# Patient Record
Sex: FEMALE | Race: WHITE | Employment: UNEMPLOYED | URBAN - METROPOLITAN AREA
[De-identification: names, ages, dates, MRNs, and addresses within clinical notes are randomized per-mention and may not be internally consistent; named-entity substitution may affect disease eponyms.]

---

## 2017-04-07 ENCOUNTER — TELEPHONE (OUTPATIENT)
Dept: INTERNAL MEDICINE | Age: 57
End: 2017-04-07

## 2017-07-20 RX ORDER — OMEPRAZOLE 40 MG/1
CAPSULE, DELAYED RELEASE ORAL
Qty: 90 CAPSULE | Refills: 3 | Status: SHIPPED | OUTPATIENT
Start: 2017-07-20 | End: 2018-12-10 | Stop reason: SDUPTHER

## 2017-07-27 RX ORDER — FENOFIBRATE 145 MG/1
145 TABLET, COATED ORAL DAILY
Qty: 90 TABLET | Refills: 1 | Status: SHIPPED | OUTPATIENT
Start: 2017-07-27 | End: 2018-05-15 | Stop reason: SDUPTHER

## 2017-07-27 RX ORDER — ATORVASTATIN CALCIUM 40 MG/1
40 TABLET, FILM COATED ORAL DAILY
Qty: 90 TABLET | Refills: 1 | Status: SHIPPED | OUTPATIENT
Start: 2017-07-27 | End: 2018-05-15 | Stop reason: SDUPTHER

## 2018-05-15 ENCOUNTER — OFFICE VISIT (OUTPATIENT)
Dept: INTERNAL MEDICINE CLINIC | Age: 58
End: 2018-05-15

## 2018-05-15 VITALS
RESPIRATION RATE: 16 BRPM | TEMPERATURE: 98.2 F | DIASTOLIC BLOOD PRESSURE: 78 MMHG | OXYGEN SATURATION: 98 % | BODY MASS INDEX: 28.67 KG/M2 | WEIGHT: 178.4 LBS | HEART RATE: 98 BPM | HEIGHT: 66 IN | SYSTOLIC BLOOD PRESSURE: 132 MMHG

## 2018-05-15 DIAGNOSIS — E11.22 TYPE 2 DIABETES MELLITUS WITH STAGE 4 CHRONIC KIDNEY DISEASE, WITH LONG-TERM CURRENT USE OF INSULIN (HCC): ICD-10-CM

## 2018-05-15 DIAGNOSIS — R53.83 FATIGUE, UNSPECIFIED TYPE: ICD-10-CM

## 2018-05-15 DIAGNOSIS — E78.2 MIXED HYPERLIPIDEMIA: ICD-10-CM

## 2018-05-15 DIAGNOSIS — E13.21 DIABETIC NEPHROPATHY ASSOCIATED WITH OTHER SPECIFIED DIABETES MELLITUS (HCC): ICD-10-CM

## 2018-05-15 DIAGNOSIS — Z13.31 POSITIVE DEPRESSION SCREENING: ICD-10-CM

## 2018-05-15 DIAGNOSIS — K86.1 IDIOPATHIC CHRONIC PANCREATITIS (HCC): ICD-10-CM

## 2018-05-15 DIAGNOSIS — Z79.4 TYPE 2 DIABETES MELLITUS WITH STAGE 4 CHRONIC KIDNEY DISEASE, WITH LONG-TERM CURRENT USE OF INSULIN (HCC): ICD-10-CM

## 2018-05-15 DIAGNOSIS — Z23 NEED FOR PNEUMOCOCCAL VACCINATION: ICD-10-CM

## 2018-05-15 DIAGNOSIS — I10 ESSENTIAL HYPERTENSION: Primary | ICD-10-CM

## 2018-05-15 DIAGNOSIS — Z12.31 SCREENING MAMMOGRAM, ENCOUNTER FOR: ICD-10-CM

## 2018-05-15 DIAGNOSIS — N18.4 TYPE 2 DIABETES MELLITUS WITH STAGE 4 CHRONIC KIDNEY DISEASE, WITH LONG-TERM CURRENT USE OF INSULIN (HCC): ICD-10-CM

## 2018-05-15 DIAGNOSIS — K50.119 CROHN'S DISEASE OF LARGE INTESTINE WITH COMPLICATION (HCC): ICD-10-CM

## 2018-05-15 DIAGNOSIS — F32.89 OTHER DEPRESSION: ICD-10-CM

## 2018-05-15 PROCEDURE — 99214 OFFICE O/P EST MOD 30 MIN: CPT | Performed by: FAMILY MEDICINE

## 2018-05-15 PROCEDURE — 90471 IMMUNIZATION ADMIN: CPT | Performed by: FAMILY MEDICINE

## 2018-05-15 PROCEDURE — G0444 DEPRESSION SCREEN ANNUAL: HCPCS | Performed by: FAMILY MEDICINE

## 2018-05-15 PROCEDURE — G8431 POS CLIN DEPRES SCRN F/U DOC: HCPCS | Performed by: FAMILY MEDICINE

## 2018-05-15 PROCEDURE — 90670 PCV13 VACCINE IM: CPT | Performed by: FAMILY MEDICINE

## 2018-05-15 RX ORDER — INDAPAMIDE 1.25 MG/1
1 TABLET, FILM COATED ORAL DAILY
Refills: 0 | COMMUNITY
Start: 2018-05-02 | End: 2020-04-13

## 2018-05-15 RX ORDER — ATORVASTATIN CALCIUM 40 MG/1
40 TABLET, FILM COATED ORAL DAILY
Qty: 90 TABLET | Refills: 3 | Status: SHIPPED | OUTPATIENT
Start: 2018-05-15 | End: 2019-08-16 | Stop reason: SDUPTHER

## 2018-05-15 RX ORDER — DULOXETIN HYDROCHLORIDE 30 MG/1
30 CAPSULE, DELAYED RELEASE ORAL DAILY
Qty: 90 CAPSULE | Refills: 1 | Status: SHIPPED | OUTPATIENT
Start: 2018-05-15 | End: 2020-04-13

## 2018-05-15 RX ORDER — FENOFIBRATE 145 MG/1
145 TABLET, COATED ORAL DAILY
Qty: 90 TABLET | Refills: 3 | Status: SHIPPED | OUTPATIENT
Start: 2018-05-15 | End: 2019-08-16 | Stop reason: SDUPTHER

## 2018-05-15 RX ORDER — SODIUM BICARBONATE 650 MG/1
650 TABLET ORAL 2 TIMES DAILY
Qty: 180 TABLET | Refills: 3 | Status: SHIPPED | OUTPATIENT
Start: 2018-05-15 | End: 2020-03-11 | Stop reason: SDUPTHER

## 2018-05-15 ASSESSMENT — PATIENT HEALTH QUESTIONNAIRE - PHQ9
5. POOR APPETITE OR OVEREATING: 2
3. TROUBLE FALLING OR STAYING ASLEEP: 3
7. TROUBLE CONCENTRATING ON THINGS, SUCH AS READING THE NEWSPAPER OR WATCHING TELEVISION: 1
10. IF YOU CHECKED OFF ANY PROBLEMS, HOW DIFFICULT HAVE THESE PROBLEMS MADE IT FOR YOU TO DO YOUR WORK, TAKE CARE OF THINGS AT HOME, OR GET ALONG WITH OTHER PEOPLE: 2
2. FEELING DOWN, DEPRESSED OR HOPELESS: 3
8. MOVING OR SPEAKING SO SLOWLY THAT OTHER PEOPLE COULD HAVE NOTICED. OR THE OPPOSITE, BEING SO FIGETY OR RESTLESS THAT YOU HAVE BEEN MOVING AROUND A LOT MORE THAN USUAL: 0
9. THOUGHTS THAT YOU WOULD BE BETTER OFF DEAD, OR OF HURTING YOURSELF: 0
SUM OF ALL RESPONSES TO PHQ9 QUESTIONS 1 & 2: 3
6. FEELING BAD ABOUT YOURSELF - OR THAT YOU ARE A FAILURE OR HAVE LET YOURSELF OR YOUR FAMILY DOWN: 0
SUM OF ALL RESPONSES TO PHQ QUESTIONS 1-9: 12
4. FEELING TIRED OR HAVING LITTLE ENERGY: 3

## 2018-09-26 ENCOUNTER — TELEPHONE (OUTPATIENT)
Dept: INTERNAL MEDICINE CLINIC | Age: 58
End: 2018-09-26

## 2018-12-11 RX ORDER — OMEPRAZOLE 40 MG/1
CAPSULE, DELAYED RELEASE ORAL
Qty: 90 CAPSULE | Refills: 3 | Status: SHIPPED | OUTPATIENT
Start: 2018-12-11 | End: 2020-03-11 | Stop reason: SDUPTHER

## 2019-06-27 ENCOUNTER — OFFICE VISIT (OUTPATIENT)
Dept: FAMILY MEDICINE CLINIC | Age: 59
End: 2019-06-27
Payer: COMMERCIAL

## 2019-06-27 VITALS
SYSTOLIC BLOOD PRESSURE: 150 MMHG | OXYGEN SATURATION: 98 % | BODY MASS INDEX: 28.61 KG/M2 | TEMPERATURE: 98.6 F | WEIGHT: 178 LBS | HEIGHT: 66 IN | HEART RATE: 97 BPM | DIASTOLIC BLOOD PRESSURE: 88 MMHG

## 2019-06-27 DIAGNOSIS — N18.4 TYPE 2 DIABETES MELLITUS WITH STAGE 4 CHRONIC KIDNEY DISEASE, WITH LONG-TERM CURRENT USE OF INSULIN (HCC): ICD-10-CM

## 2019-06-27 DIAGNOSIS — Z79.4 TYPE 2 DIABETES MELLITUS WITH STAGE 4 CHRONIC KIDNEY DISEASE, WITH LONG-TERM CURRENT USE OF INSULIN (HCC): ICD-10-CM

## 2019-06-27 DIAGNOSIS — Z12.39 BREAST CANCER SCREENING: ICD-10-CM

## 2019-06-27 DIAGNOSIS — R30.0 DYSURIA: Primary | ICD-10-CM

## 2019-06-27 DIAGNOSIS — E11.22 TYPE 2 DIABETES MELLITUS WITH STAGE 4 CHRONIC KIDNEY DISEASE, WITH LONG-TERM CURRENT USE OF INSULIN (HCC): ICD-10-CM

## 2019-06-27 LAB
BILIRUBIN, POC: NORMAL
BLOOD URINE, POC: NORMAL
CLARITY, POC: NORMAL
COLOR, POC: YELLOW
CREATININE URINE: 121.7 MG/DL
GLUCOSE URINE, POC: NORMAL
KETONES, POC: NORMAL
LEUKOCYTE EST, POC: NORMAL
MICROALBUMIN UR-MCNC: 247.4 MG/DL
MICROALBUMIN/CREAT UR-RTO: 2032.9 MG/G (ref 0–30)
NITRITE, POC: NORMAL
PH, POC: 5.5
PROTEIN, POC: NORMAL
SPECIFIC GRAVITY, POC: 1.03
UROBILINOGEN, POC: 3.5

## 2019-06-27 PROCEDURE — 81002 URINALYSIS NONAUTO W/O SCOPE: CPT | Performed by: FAMILY MEDICINE

## 2019-06-27 PROCEDURE — 99213 OFFICE O/P EST LOW 20 MIN: CPT | Performed by: FAMILY MEDICINE

## 2019-06-27 RX ORDER — CIPROFLOXACIN 500 MG/1
500 TABLET, FILM COATED ORAL 2 TIMES DAILY
Qty: 20 TABLET | Refills: 0 | Status: SHIPPED | OUTPATIENT
Start: 2019-06-27 | End: 2019-07-07

## 2019-06-27 NOTE — PROGRESS NOTES
Patient: Lea Workman    YOB: 1960    Date: 6/27/19    Chief Complaint   Patient presents with    Urinary Frequency     pt has been having a urinary frequency for 1 week    Dysuria     she is having burning and pain during urination & feels worse today        Patient Active Problem List    Diagnosis Date Noted    Diabetic nephropathy ([facility] 08/06/2013    Insomnia 07/01/2013    CRF (chronic renal failure) 07/01/2013    Crohn's disease ([facility]     Type 2 diabetes mellitus with stage 4 chronic kidney disease, with long-term current use of insulin ([facility]     Depression     Mixed hyperlipidemia     Essential hypertension     Chronic pancreatitis (HCC)      Overview Note:     replace inactive diagnosis      Allergic rhinitis      Overview Note:     replace inactive diagnosis         Allergies   Allergen Reactions    Iv Dye [Iodides]     Ketorolac Tromethamine        Vitals:    06/27/19 1406   BP: (!) 150/88   Site: Left Upper Arm   Position: Sitting   Cuff Size: Medium Adult   Pulse: 97   Temp: 98.6 °F (37 °C)   TempSrc: Oral   SpO2: 98%   Weight: 178 lb (80.7 kg)   Height: 5' 6\" (1.676 m)      Body mass index is 28.73 kg/m². Treatment Adherence:   Medication compliance:  compliant most of the time  Diet compliance:  compliant most of the time  Weight trend: stable  Current exercise: walks 1 time(s) per day and no regular exercise  What might prevent you from meeting your goal?: none    Diabetes Mellitus Type 2: Current symptoms/problems include none. Home blood sugar records:  patient tests 3 time(s) per day  Any episodes of hypoglycemia? no  Eye exam current (within one year): yes  Tobacco history: She  reports that she quit smoking about 8 years ago. Her smoking use included cigarettes. She started smoking about 44 years ago. She has a 135.00 pack-year smoking history. She has never used smokeless tobacco.   Daily Aspirin?  No:   Known diabetic complications: none        HPI  For last week she had increased burning frequency of urination. No fever chills back pain nausea or cramping. She gets these quite a bit and she is been working with her urologist at [facility] to decrease the frequency. She said her last hemoglobin A1c was 7. Review of Systems    Constitutional: Negative for fatigue, fever and sweats. HEENT: Negative for eye discharge and vision loss. Negative for ear drainage, hearing loss and nasal drainage. Respiratory: Negative for cough, dyspnea and wheezing. Cardiovascular:  Negative for chest pain, claudication and irregular heartbeat/palpitations. Gastrointestinal: Negative for abdominal pain, nausea, constipation and diarrhea. Genitourinary: positive for dysuria, pt is postmenopausal  Metabolic/Endocrine: Negative for cold intolerance, heat intolerance, polydipsia and polyphagia. No unintended weight loss or weight gain. Neuro/Psychiatric: Negative for gait disturbance. Negative for psychiatric symptoms. Dermatologic: Negative for pruritus and rash. Musculoskeletal: Negative for bone/joint symptoms. No numbness or tingling. No loss of function. Hematology: Negative for bleeding and easy bruising. Immunology:  Negative for environmental allergies and food allergies. Physical Exam    Patient's medication, allergies, past medical, surgical, social and family histories were reviewed and updated as appropriate. PHYSICAL EXAM   General appearance: Alert oriented pleasant cooperative in no acute distress. Lungs: Clear to auscultation  Heart: Regular rate and rhythm  Extremities: No rashes or edema. Assessment:   Diagnosis Orders   1. Dysuria  POCT Urinalysis no Micro  3+ blood leukocytes and sugar.   We will put her on ciprofloxacin and send the urine for C&S.   2. Breast cancer screening  KRISTY DIGITAL SCREEN W CAD BILATERAL               Plan:  Current Outpatient Medications   Medication Sig Dispense Refill    ciprofloxacin (CIPRO) 500 MG tablet Take 1 tablet by mouth 2 times daily for 10 days 20 tablet 0    omeprazole (PRILOSEC) 40 MG delayed release capsule TAKE 1 CAPSULE DAILY 90 capsule 3    Insulin Glargine (BASAGLAR KWIKPEN SC) Inject 35 Units into the skin 2 times daily      indapamide (LOZOL) 1.25 MG tablet Take 1 tablet by mouth daily  0    atorvastatin (LIPITOR) 40 MG tablet Take 1 tablet by mouth daily 90 tablet 3    fenofibrate (TRICOR) 145 MG tablet Take 1 tablet by mouth daily 90 tablet 3    sodium bicarbonate 650 MG tablet Take 1 tablet by mouth 2 times daily Take 2 tabs by mouth every 12 hours 180 tablet 3    DULoxetine (CYMBALTA) 30 MG extended release capsule Take 1 capsule by mouth daily 90 capsule 1    Cholecalciferol (VITAMIN D3) 38223 UNITS CAPS Take 50,000 Units by mouth once a week 12 capsule 0    fluconazole (DIFLUCAN) 200 MG tablet Take 1 tablet by mouth 2 times daily 180 tablet 3    Insulin Pen Needle 32G X 6 MM MISC Use as directed with insulin, use 5 times daily 500 each 3    Insulin Pen Needle 31G X 6 MM MISC Use as directed with insulin 500 each 3    Adalimumab (HUMIRA SC) Inject into the skin every 2 weeks      Multiple Vitamin (DAILY VITAMIN PO) Take  by mouth.  magnesium oxide (MAG-OX) 400 MG tablet Take 400 mg by mouth daily.  Diphenoxylate-Atropine (LOMOTIL PO) Take  by mouth. 2 before each meal total 6 per day       PREDNISONE Take 5 mg by mouth daily.  insulin lispro (HUMALOG KWIKPEN) 100 UNIT/ML injection Inject 15 Units into the skin 3 times daily (before meals) for 90 days. 10units before each meal and sliding scale if over 150 (Patient taking differently: Inject 20 Units into the skin 3 times daily (before meals) 10units before each meal and sliding scale if over 150) 20 Pen 3     No current facility-administered medications for this visit.       Orders Placed This Encounter   Procedures    KRISTY DIGITAL SCREEN W CAD BILATERAL     Standing Status:   Future     Standing Expiration Date: 6/26/2020    POCT Urinalysis no Micro       Orders Placed This Encounter   Medications    ciprofloxacin (CIPRO) 500 MG tablet     Sig: Take 1 tablet by mouth 2 times daily for 10 days     Dispense:  20 tablet     Refill:  0              Return if symptoms worsen or fail to improve.     Dr. Ashleigh Taylor      6/27/19  2:45 PM

## 2019-06-30 LAB
ORGANISM: ABNORMAL
URINE CULTURE, ROUTINE: ABNORMAL
URINE CULTURE, ROUTINE: ABNORMAL

## 2019-08-16 DIAGNOSIS — E78.2 MIXED HYPERLIPIDEMIA: ICD-10-CM

## 2019-08-16 RX ORDER — ATORVASTATIN CALCIUM 40 MG/1
40 TABLET, FILM COATED ORAL DAILY
Qty: 90 TABLET | Refills: 3 | Status: SHIPPED | OUTPATIENT
Start: 2019-08-16 | End: 2020-10-20 | Stop reason: SDUPTHER

## 2019-08-16 RX ORDER — FENOFIBRATE 145 MG/1
145 TABLET, COATED ORAL DAILY
Qty: 90 TABLET | Refills: 3 | Status: SHIPPED | OUTPATIENT
Start: 2019-08-16 | End: 2020-10-20 | Stop reason: SDUPTHER

## 2019-08-16 NOTE — TELEPHONE ENCOUNTER
requesting medication refill. Rx requested:  Requested Prescriptions     Pending Prescriptions Disp Refills    atorvastatin (LIPITOR) 40 MG tablet 90 tablet 3     Sig: Take 1 tablet by mouth daily    fenofibrate (TRICOR) 145 MG tablet 90 tablet 3     Sig: Take 1 tablet by mouth daily       Last Office Visit:   6/27/2019      Next Visit Date:  No future appointments.

## 2019-12-11 ENCOUNTER — TELEPHONE (OUTPATIENT)
Dept: ADMINISTRATIVE | Age: 59
End: 2019-12-11

## 2020-03-11 RX ORDER — OMEPRAZOLE 40 MG/1
CAPSULE, DELAYED RELEASE ORAL
Qty: 90 CAPSULE | Refills: 3 | Status: SHIPPED | OUTPATIENT
Start: 2020-03-11 | End: 2020-10-20 | Stop reason: SDUPTHER

## 2020-03-11 RX ORDER — SODIUM BICARBONATE 650 MG/1
650 TABLET ORAL 2 TIMES DAILY
Qty: 180 TABLET | Refills: 3 | Status: SHIPPED | OUTPATIENT
Start: 2020-03-11

## 2020-03-13 ENCOUNTER — TELEPHONE (OUTPATIENT)
Dept: FAMILY MEDICINE CLINIC | Age: 60
End: 2020-03-13

## 2020-04-13 ENCOUNTER — OFFICE VISIT (OUTPATIENT)
Dept: FAMILY MEDICINE CLINIC | Age: 60
End: 2020-04-13
Payer: COMMERCIAL

## 2020-04-13 VITALS
HEART RATE: 120 BPM | HEIGHT: 65 IN | WEIGHT: 173 LBS | BODY MASS INDEX: 28.82 KG/M2 | SYSTOLIC BLOOD PRESSURE: 134 MMHG | OXYGEN SATURATION: 97 % | DIASTOLIC BLOOD PRESSURE: 72 MMHG | TEMPERATURE: 99 F | RESPIRATION RATE: 16 BRPM

## 2020-04-13 DIAGNOSIS — R30.0 DYSURIA: ICD-10-CM

## 2020-04-13 LAB
BILIRUBIN, POC: NORMAL
BLOOD URINE, POC: NORMAL
CLARITY, POC: CLEAR
COLOR, POC: YELLOW
GLUCOSE URINE, POC: NORMAL
KETONES, POC: NORMAL
LEUKOCYTE EST, POC: NORMAL
NITRITE, POC: NORMAL
PH, POC: 5.5
PROTEIN, POC: NORMAL
SPECIFIC GRAVITY, POC: 1.02
UROBILINOGEN, POC: NORMAL

## 2020-04-13 PROCEDURE — 99214 OFFICE O/P EST MOD 30 MIN: CPT | Performed by: FAMILY MEDICINE

## 2020-04-13 PROCEDURE — 81002 URINALYSIS NONAUTO W/O SCOPE: CPT | Performed by: FAMILY MEDICINE

## 2020-04-13 RX ORDER — LEVOFLOXACIN 250 MG/1
250 TABLET ORAL DAILY
Qty: 10 TABLET | Refills: 0 | Status: SHIPPED | OUTPATIENT
Start: 2020-04-13 | End: 2020-04-23

## 2020-04-13 NOTE — PROGRESS NOTES
pressure behind her eyes and postnasal drip. No ear pain or sore throat no cough she is not having any trouble breathing she feels a little achy and tired and she has not left her house since mid February. Her  is only gone out twice to get groceries. He is well. She has not been exposed to any other people. Review of Systems    Constitutional:positive for fatigue, fever and negative for sweats. HEENT: Negative for eye discharge and vision loss. Negative for ear drainage, hearing loss and positive for nasal drainage. Respiratory: Negative for cough, dyspnea and wheezing. Cardiovascular:  Negative for chest pain, claudication and irregular heartbeat/palpitations. Gastrointestinal: Negative for abdominal pain, nausea, constipation and positive for diarrhea. Genitourinary: positive for dysuria, patient postmenopausal.  Metabolic/Endocrine: Negative for cold intolerance, heat intolerance, polydipsia and polyphagia. No unintended weight loss or weight gain. Neuro/Psychiatric: Negative for gait disturbance. Negative for psychiatric symptoms. Dermatologic: Negative for pruritus and rash. Musculoskeletal:positive for bone/joint symptoms. No numbness or tingling. No loss of function. Hematology: Negative for bleeding and easy bruising. Immunology:  Negative for environmental allergies and food allergies. Physical Exam    Patient's medication, allergies, past medical, surgical, social and family histories were reviewed and updated as appropriate. PHYSICAL EXAM   General appearance: Alert oriented pleasant cooperative no acute distress she is wearing a mask her color is a little pale but she does not not look toxic and she certainly is not icteric. HEENT: Eyes clear nonicteric facial muscles symmetrical.  No pain to palpation of sinuses voice normal.  Oropharynx clear without erythema exudates or induration.   Ears TMs intact without erythema or induration  Neck: Soft nontender no adenopathy or sliding scale if over 150 (Patient taking differently: Inject 20 Units into the skin 3 times daily (before meals) 10units before each meal and sliding scale if over 150) 20 Pen 3    Adalimumab (HUMIRA SC) Inject into the skin every 2 weeks      Multiple Vitamin (DAILY VITAMIN PO) Take  by mouth.  magnesium oxide (MAG-OX) 400 MG tablet Take 400 mg by mouth daily.  Diphenoxylate-Atropine (LOMOTIL PO) Take  by mouth. 2 before each meal total 6 per day       PREDNISONE Take 5 mg by mouth daily. No current facility-administered medications for this visit. Orders Placed This Encounter   Procedures    Culture, Urine     Standing Status:   Future     Standing Expiration Date:   4/13/2021     Order Specific Question:   Specify (ex-cath, midstream, cysto, etc)? Answer:   unknown    POCT Urinalysis no Micro       Orders Placed This Encounter   Medications    levoFLOXacin (LEVAQUIN) 250 MG tablet     Sig: Take 1 tablet by mouth daily for 10 days     Dispense:  10 tablet     Refill:  0              Return in about 6 weeks (around 5/25/2020).     Dr. Alejandro Smiley      4/13/20  1:20 PM

## 2020-04-14 ENCOUNTER — TELEPHONE (OUTPATIENT)
Dept: FAMILY MEDICINE CLINIC | Age: 60
End: 2020-04-14

## 2020-04-14 NOTE — TELEPHONE ENCOUNTER
Called and left a message letting Shawnee Dill know that Dr. Irene Andrews would like to see her back in 6 weeks for a f/u from her visit with Dr. Irene Andrews yesterday 04/13/2020.

## 2020-04-15 LAB — URINE CULTURE, ROUTINE: NORMAL

## 2020-04-24 ENCOUNTER — TELEPHONE (OUTPATIENT)
Dept: FAMILY MEDICINE CLINIC | Age: 60
End: 2020-04-24

## 2020-04-24 NOTE — TELEPHONE ENCOUNTER
Patient called back from message left regarding urine culture results. Provided patient with the results from labs. Patient stated that she is still experiencing the burning during urination. Stated she is feeling no pressure, and when asked, she indicated that she had completed the antibiotics. Patient has been scheduled for a 6/1/20 office appointment, a follow up from her 4/13 appt.

## 2020-05-04 ENCOUNTER — VIRTUAL VISIT (OUTPATIENT)
Dept: FAMILY MEDICINE CLINIC | Age: 60
End: 2020-05-04
Payer: COMMERCIAL

## 2020-05-04 ENCOUNTER — NURSE TRIAGE (OUTPATIENT)
Dept: OTHER | Facility: CLINIC | Age: 60
End: 2020-05-04

## 2020-05-04 PROCEDURE — 99213 OFFICE O/P EST LOW 20 MIN: CPT | Performed by: FAMILY MEDICINE

## 2020-05-04 RX ORDER — AZITHROMYCIN 250 MG/1
TABLET, FILM COATED ORAL
Qty: 1 PACKET | Refills: 0 | Status: SHIPPED | OUTPATIENT
Start: 2020-05-04 | End: 2020-05-14

## 2020-05-04 RX ORDER — PREDNISONE 20 MG/1
20 TABLET ORAL DAILY
Qty: 5 TABLET | Refills: 0 | Status: SHIPPED | OUTPATIENT
Start: 2020-05-04 | End: 2020-05-09

## 2020-05-04 NOTE — PROGRESS NOTES
Patient: Beverly Antunez    YOB: 1960    Date: 5/4/20    Chief Complaint   Patient presents with    Fever    Urticaria       Patient Active Problem List    Diagnosis Date Noted    Diabetic nephropathy ([facility] 08/06/2013    Insomnia 07/01/2013    CRF (chronic renal failure) 07/01/2013    Cro[facility]'s disease ([facility]     Type 2 diabetes mellitus with stage 4 chronic kidney disease, with long-term current use of insulin [facility]     Depression     Mixed hyperlipidemia     Essential hypertension     Chronic pancreatitis ([facility]      Overview Note:     replace inactive diagnosis      Allergic rhinitis      Overview Note:     replace inactive diagnosis         Allergies   Allergen Reactions    Iv Dye [Iodides]     Ketorolac Tromethamine            There were no vitals filed for this visit. There is no height or weight on file to calculate BMI. HPI For three days she has had hives. She gets hives when she has an infection. No local symptoms. She has had fever up to 102 and no cough or chest pain or and pain or problems drinking. She has lack of appetitive. She was recently treated with Levaquin (finished about 3 weeks ago) those symptoms resolved. Pt is immunocompromised. Sugars around 140. Review of Systems    Constitutional: positve for fatigue, fever and sweats. HEENT: Negative for eye discharge and vision loss. Negative for ear drainage, hearing loss and nasal drainage. Respiratory: Negative for cough, dyspnea and wheezing. Cardiovascular:  Negative for chest pain, claudication and irregular heartbeat/palpitations. Gastrointestinal: Negative for abdominal pain, nausea, constipation and diarrhea. Genitourinary: Negative for dysuria and vaginal discharge. Metabolic/Endocrine: Negative for cold intolerance, heat intolerance, polydipsia and polyphagia. No unintended weight loss or weight gain. Neuro/Psychiatric: Negative for gait disturbance. Negative for psychiatric symptoms.   Dermatologic: MISC Use as directed with insulin 500 each 3    insulin lispro (HUMALOG KWIKPEN) 100 UNIT/ML injection Inject 15 Units into the skin 3 times daily (before meals) for 90 days. 10units before each meal and sliding scale if over 150 (Patient taking differently: Inject 20 Units into the skin 3 times daily (before meals) 10units before each meal and sliding scale if over 150) 20 Pen 3    Adalimumab (HUMIRA SC) Inject into the skin every 2 weeks      Multiple Vitamin (DAILY VITAMIN PO) Take  by mouth.  magnesium oxide (MAG-OX) 400 MG tablet Take 400 mg by mouth daily.  Diphenoxylate-Atropine (LOMOTIL PO) Take  by mouth. 2 before each meal total 6 per day       PREDNISONE Take 5 mg by mouth daily. No current facility-administered medications for this visit. No orders of the defined types were placed in this encounter. Orders Placed This Encounter   Medications    azithromycin (ZITHROMAX Z-IVORY) 250 MG tablet     Sig: Complete entire pack as directed. Dispense:  1 packet     Refill:  0    predniSONE (DELTASONE) 20 MG tablet     Sig: Take 1 tablet by mouth daily for 5 days     Dispense:  5 tablet     Refill:  0             Return if symptoms worsen or fail to improve.     Dr. Kathrine Montiel      5/4/20  5:17 PM

## 2020-06-03 ENCOUNTER — VIRTUAL VISIT (OUTPATIENT)
Dept: FAMILY MEDICINE CLINIC | Age: 60
End: 2020-06-03
Payer: COMMERCIAL

## 2020-06-03 PROCEDURE — 99214 OFFICE O/P EST MOD 30 MIN: CPT | Performed by: FAMILY MEDICINE

## 2020-06-03 RX ORDER — SERTRALINE HYDROCHLORIDE 25 MG/1
25 TABLET, FILM COATED ORAL DAILY
Qty: 90 TABLET | Refills: 1 | Status: SHIPPED | OUTPATIENT
Start: 2020-06-03 | End: 2020-11-17

## 2020-06-03 ASSESSMENT — PATIENT HEALTH QUESTIONNAIRE - PHQ9
2. FEELING DOWN, DEPRESSED OR HOPELESS: 1
1. LITTLE INTEREST OR PLEASURE IN DOING THINGS: 1
SUM OF ALL RESPONSES TO PHQ9 QUESTIONS 1 & 2: 2
SUM OF ALL RESPONSES TO PHQ QUESTIONS 1-9: 2
SUM OF ALL RESPONSES TO PHQ QUESTIONS 1-9: 2

## 2020-06-03 NOTE — PROGRESS NOTES
2. Mixed hyperlipidemia     3. Essential hypertension     4. Visit for screening mammogram  KRISTY DIGITAL SCREEN W OR WO CAD BILATERAL   5. Other depression  sertraline (ZOLOFT) 25 MG tablet              Plan:  Current Outpatient Medications   Medication Sig Dispense Refill    sertraline (ZOLOFT) 25 MG tablet Take 1 tablet by mouth daily 90 tablet 1    omeprazole (PRILOSEC) 40 MG delayed release capsule TAKE 1 CAPSULE DAILY 90 capsule 3    sodium bicarbonate 650 MG tablet Take 1 tablet by mouth 2 times daily Take 2 tabs by mouth every 12 hours 180 tablet 3    atorvastatin (LIPITOR) 40 MG tablet Take 1 tablet by mouth daily 90 tablet 3    fenofibrate (TRICOR) 145 MG tablet Take 1 tablet by mouth daily 90 tablet 3    Insulin Glargine (BASAGLAR KWIKPEN SC) Inject 35 Units into the skin 2 times daily      Cholecalciferol (VITAMIN D3) 22057 UNITS CAPS Take 50,000 Units by mouth once a week 12 capsule 0    fluconazole (DIFLUCAN) 200 MG tablet Take 1 tablet by mouth 2 times daily 180 tablet 3    Insulin Pen Needle 32G X 6 MM MISC Use as directed with insulin, use 5 times daily 500 each 3    Insulin Pen Needle 31G X 6 MM MISC Use as directed with insulin 500 each 3    insulin lispro (HUMALOG KWIKPEN) 100 UNIT/ML injection Inject 15 Units into the skin 3 times daily (before meals) for 90 days. 10units before each meal and sliding scale if over 150 (Patient taking differently: Inject 20 Units into the skin 3 times daily (before meals) 10units before each meal and sliding scale if over 150) 20 Pen 3    Multiple Vitamin (DAILY VITAMIN PO) Take  by mouth.  magnesium oxide (MAG-OX) 400 MG tablet Take 400 mg by mouth daily.  Diphenoxylate-Atropine (LOMOTIL PO) Take  by mouth. 2 before each meal total 6 per day       PREDNISONE Take 5 mg by mouth daily. No current facility-administered medications for this visit.       Orders Placed This Encounter   Procedures    KRISTY DIGITAL SCREEN W OR WO CAD

## 2020-06-04 ENCOUNTER — TELEPHONE (OUTPATIENT)
Dept: FAMILY MEDICINE CLINIC | Age: 60
End: 2020-06-04

## 2020-10-20 ENCOUNTER — OFFICE VISIT (OUTPATIENT)
Dept: FAMILY MEDICINE CLINIC | Age: 60
End: 2020-10-20
Payer: COMMERCIAL

## 2020-10-20 VITALS
SYSTOLIC BLOOD PRESSURE: 145 MMHG | HEIGHT: 66 IN | HEART RATE: 105 BPM | OXYGEN SATURATION: 98 % | WEIGHT: 160 LBS | DIASTOLIC BLOOD PRESSURE: 93 MMHG | RESPIRATION RATE: 16 BRPM | BODY MASS INDEX: 25.71 KG/M2 | TEMPERATURE: 98.2 F

## 2020-10-20 DIAGNOSIS — E11.22 TYPE 2 DIABETES MELLITUS WITH STAGE 4 CHRONIC KIDNEY DISEASE, WITH LONG-TERM CURRENT USE OF INSULIN (HCC): ICD-10-CM

## 2020-10-20 DIAGNOSIS — Z11.59 NEED FOR HEPATITIS C SCREENING TEST: ICD-10-CM

## 2020-10-20 DIAGNOSIS — N18.4 TYPE 2 DIABETES MELLITUS WITH STAGE 4 CHRONIC KIDNEY DISEASE, WITH LONG-TERM CURRENT USE OF INSULIN (HCC): ICD-10-CM

## 2020-10-20 DIAGNOSIS — R17 JAUNDICE: ICD-10-CM

## 2020-10-20 DIAGNOSIS — R53.83 FATIGUE, UNSPECIFIED TYPE: ICD-10-CM

## 2020-10-20 DIAGNOSIS — Z79.4 TYPE 2 DIABETES MELLITUS WITH STAGE 4 CHRONIC KIDNEY DISEASE, WITH LONG-TERM CURRENT USE OF INSULIN (HCC): ICD-10-CM

## 2020-10-20 DIAGNOSIS — E78.2 MIXED HYPERLIPIDEMIA: ICD-10-CM

## 2020-10-20 DIAGNOSIS — I10 ESSENTIAL HYPERTENSION: ICD-10-CM

## 2020-10-20 LAB
ALBUMIN SERPL-MCNC: 4.2 G/DL (ref 3.5–4.6)
ALP BLD-CCNC: 83 U/L (ref 40–130)
ALT SERPL-CCNC: 27 U/L (ref 0–33)
ANION GAP SERPL CALCULATED.3IONS-SCNC: 14 MEQ/L (ref 9–15)
AST SERPL-CCNC: 39 U/L (ref 0–35)
BASOPHILS ABSOLUTE: 0 K/UL (ref 0–0.2)
BASOPHILS RELATIVE PERCENT: 0.5 %
BILIRUB SERPL-MCNC: 0.3 MG/DL (ref 0.2–0.7)
BUN BLDV-MCNC: 27 MG/DL (ref 8–23)
CALCIUM SERPL-MCNC: 9.6 MG/DL (ref 8.5–9.9)
CHLORIDE BLD-SCNC: 103 MEQ/L (ref 95–107)
CHOLESTEROL, FASTING: 218 MG/DL (ref 0–199)
CO2: 22 MEQ/L (ref 20–31)
CREAT SERPL-MCNC: 1.45 MG/DL (ref 0.5–0.9)
EOSINOPHILS ABSOLUTE: 0.1 K/UL (ref 0–0.7)
EOSINOPHILS RELATIVE PERCENT: 0.9 %
GFR AFRICAN AMERICAN: 44.3
GFR NON-AFRICAN AMERICAN: 36.6
GLOBULIN: 3.6 G/DL (ref 2.3–3.5)
GLUCOSE BLD-MCNC: 170 MG/DL (ref 70–99)
HBA1C MFR BLD: 7.3 %
HCT VFR BLD CALC: 33.7 % (ref 37–47)
HDLC SERPL-MCNC: 36 MG/DL (ref 40–59)
HEMOGLOBIN: 10.9 G/DL (ref 12–16)
HEPATITIS C ANTIBODY INTERPRETATION: NORMAL
LDL CHOLESTEROL CALCULATED: ABNORMAL MG/DL (ref 0–129)
LYMPHOCYTES ABSOLUTE: 1.8 K/UL (ref 1–4.8)
LYMPHOCYTES RELATIVE PERCENT: 20.3 %
MCH RBC QN AUTO: 30.1 PG (ref 27–31.3)
MCHC RBC AUTO-ENTMCNC: 32.5 % (ref 33–37)
MCV RBC AUTO: 92.7 FL (ref 82–100)
MONOCYTES ABSOLUTE: 0.7 K/UL (ref 0.2–0.8)
MONOCYTES RELATIVE PERCENT: 7.3 %
NEUTROPHILS ABSOLUTE: 6.4 K/UL (ref 1.4–6.5)
NEUTROPHILS RELATIVE PERCENT: 71 %
PDW BLD-RTO: 14.2 % (ref 11.5–14.5)
PLATELET # BLD: 337 K/UL (ref 130–400)
POTASSIUM SERPL-SCNC: 4.7 MEQ/L (ref 3.4–4.9)
RBC # BLD: 3.63 M/UL (ref 4.2–5.4)
SODIUM BLD-SCNC: 139 MEQ/L (ref 135–144)
T4 FREE: 1.13 NG/DL (ref 0.84–1.68)
TOTAL PROTEIN: 7.8 G/DL (ref 6.3–8)
TRIGLYCERIDE, FASTING: 403 MG/DL (ref 0–150)
TSH REFLEX: 4.76 UIU/ML (ref 0.44–3.86)
WBC # BLD: 9.1 K/UL (ref 4.8–10.8)

## 2020-10-20 PROCEDURE — 3051F HG A1C>EQUAL 7.0%<8.0%: CPT | Performed by: FAMILY MEDICINE

## 2020-10-20 PROCEDURE — 90688 IIV4 VACCINE SPLT 0.5 ML IM: CPT | Performed by: FAMILY MEDICINE

## 2020-10-20 PROCEDURE — 83036 HEMOGLOBIN GLYCOSYLATED A1C: CPT | Performed by: FAMILY MEDICINE

## 2020-10-20 PROCEDURE — 90471 IMMUNIZATION ADMIN: CPT | Performed by: FAMILY MEDICINE

## 2020-10-20 PROCEDURE — 99214 OFFICE O/P EST MOD 30 MIN: CPT | Performed by: FAMILY MEDICINE

## 2020-10-20 RX ORDER — OMEPRAZOLE 40 MG/1
CAPSULE, DELAYED RELEASE ORAL
Qty: 90 CAPSULE | Refills: 3 | Status: SHIPPED | OUTPATIENT
Start: 2020-10-20 | End: 2022-01-11 | Stop reason: SDUPTHER

## 2020-10-20 RX ORDER — FENOFIBRATE 145 MG/1
145 TABLET, COATED ORAL DAILY
Qty: 90 TABLET | Refills: 3 | Status: SHIPPED | OUTPATIENT
Start: 2020-10-20 | End: 2021-11-18 | Stop reason: SDUPTHER

## 2020-10-20 RX ORDER — ATORVASTATIN CALCIUM 40 MG/1
40 TABLET, FILM COATED ORAL DAILY
Qty: 90 TABLET | Refills: 3 | Status: SHIPPED | OUTPATIENT
Start: 2020-10-20 | End: 2020-10-30 | Stop reason: ALTCHOICE

## 2020-10-20 NOTE — PROGRESS NOTES
Patient: Patricia Lo    YOB: 1960    Date: 10/20/20    Chief Complaint   Patient presents with    Generalized Body Aches     She complains of having all over body pain, not feeling well.  Alopecia     She complains of having hair loss.  Memory Loss     She complains of memory loss.  Health Maintenance     she declines CT mau screen and HIV screen. Patient Active Problem List    Diagnosis Date Noted    Diabetic nephropathy ([facility] 08/06/2013    Insomnia 07/01/2013    CRF (chronic renal failure) 07/01/2013    Crohn's disease ([facility]     Type 2 diabetes mellitus with stage 4 chronic kidney disease, with long-term current use of insulin (HCC)     Depression     Mixed hyperlipidemia     Essential hypertension     Chronic pancreatitis (HCC)      Overview Note:     replace inactive diagnosis      Allergic rhinitis      Overview Note:     replace inactive diagnosis         Allergies   Allergen Reactions    Iv Dye [Iodides]     Ketorolac Tromethamine        Vitals:    10/20/20 1452 10/20/20 1506   BP: (!) 151/84 (!) 145/93   Site: Left Upper Arm Left Upper Arm   Position: Sitting Sitting   Cuff Size: Small Adult Small Adult   Pulse: 105    Resp: 16    Temp: 98.2 °F (36.8 °C)    TempSrc: Oral    SpO2: 98%    Weight: 160 lb (72.6 kg)    Height: 5' 5.5\" (1.664 m)       Body mass index is 26.22 kg/m². PHQ Scores 6/3/2020 5/15/2018 8/12/2014   PHQ2 Score 2 3 0   PHQ9 Score 2 12 0     Interpretation of Total Score Depression Severity: 1-4 = Minimal depression, 5-9 = Mild depression, 10-14 = Moderate depression, 15-19 = Moderately severe depression, 20-27 = Severe depression      Treatment Adherence:   Medication compliance:  compliant all of the time  Diet compliance:  compliant most of the time  Weight trend: decreasing  Current exercise: no regular exercise  Diabetes Mellitus Type 2: Current symptoms/problems include none.   Home blood sugar records:  patient does not test  Any episodes of hypoglycemia? no  Eye exam current (within one year): yes  Tobacco history: She  reports that she quit smoking about 10 years ago. Her smoking use included cigarettes. She started smoking about 45 years ago. She has a 135.00 pack-year smoking history. She has never used smokeless tobacco.   Daily Aspirin? No:   Known diabetic complications: none        HPI    She comes in today because she just does not feel well. She actually was even scheduled there was a [employer] upfront however I need. She says she is feeling well she really needs to be seen. She been seen by her kidney doctor in the end of August I have the notes from that doctor but the lab work that was done at that time was not sent over. The patient's blood pressure at that time was about the same as we are getting today in office. Her sugars have been good however. Her hair has been falling out she is very tired and she just does not feel good her weight is down quite a bit. No nausea vomiting diarrhea chest pressure cough or fever    Review of Systems    Constitutional: positive for fatigue, negative for fever and sweats. HEENT: Negative for eye discharge and vision loss. Negative for ear drainage, hearing loss and nasal drainage. Respiratory: Negative for cough, dyspnea and wheezing. Cardiovascular:  Negative for chest pain, claudication and irregular heartbeat/palpitations. Gastrointestinal: Negative for abdominal pain,constipation and diarrhea. Positive for nausea. Genitourinary: Negative for dysuria, patient postmenopausal.  Metabolic/Endocrine: Negative for cold intolerance, heat intolerance, polydipsia and polyphagia. No unintended weight loss or weight gain. Neuro/Psychiatric: Negative for gait disturbance. Negative for psychiatric symptoms. Dermatologic: Negative for pruritus and rash. Musculoskeletal: positive for bone/joint symptoms. No numbness or tingling. No loss of function.   Hematology: Negative for bleeding and easy bruising. Immunology:  Negative for environmental allergies and food allergies. Vaccine Information Sheet, \"Influenza - Inactivated\"  given to Binu Johnston, or parent/legal guardian of  Binu Johnston and verbalized understanding. Patient responses:    Have you ever had a reaction to a flu vaccine? No  Are you able to eat eggs without adverse effects? Yes  Do you have any current illness? No  Have you ever had [place] Syndrome? No    Flu vaccine given per order. Please see immunization tab. Physical Exam    Patient's medication, allergies, past medical, surgical, social and family histories were reviewed and updated as appropriate. PHYSICAL EXAM   General appearance: Alert oriented pleasant cooperative no acute distress weight down 15 pounds  Looking at her eyes her sclera seems slightly icteric she has a diffuse hair loss without any patchy alopecia her scalp is normal  Neck: Soft nontender no adenopathy or masses  Lungs: Clear to auscultation without wheezes rhonchi or rales  Heart: Regular rate and rhythm without murmurs rubs or gallops  Extremities: No edema or calf tenderness and she declined a diabetic foot exam today. Right  Abdomen: Soft nontender no rebound guarding or masses normal bowel sounds    Assessment:   Diagnosis Orders   1. Type 2 diabetes mellitus with stage 4 chronic kidney disease, with long-term current use of insulin ([facility])  HM DIABETES FOOT EXAM    POCT glycosylated hemoglobin (Hb A1C) good at 7.3 however concerned about her slight icterus have ordered additional lab work. Comprehensive Metabolic Panel   2. Mixed hyperlipidemia  atorvastatin (LIPITOR) 40 MG tablet    fenofibrate (TRICOR) 145 MG tablet    Lipid, Fasting   3. Essential hypertension  Comprehensive Metabolic Panel   4. Need for influenza vaccination  INFLUENZA, QUADV, 3 YRS AND OLDER, IM, MDV, 0.5ML ([facility])   5. Need for hepatitis C screening test  Hepatitis C Antibody   6.  Hair loss recommended she consider trying Rogaine over-the-counter   7. Generalized abdominal pain     8. Fatigue, unspecified type  TSH with Reflex    T4, Free    CBC Auto Differential   9. Senile osteoporosis  DEXA BONE DENSITY AXIAL SKELETON   10. Jaundice  Comprehensive Metabolic Panel               Plan:  Current Outpatient Medications   Medication Sig Dispense Refill    atorvastatin (LIPITOR) 40 MG tablet Take 1 tablet by mouth daily 90 tablet 3    fenofibrate (TRICOR) 145 MG tablet Take 1 tablet by mouth daily 90 tablet 3    omeprazole (PRILOSEC) 40 MG delayed release capsule TAKE 1 CAPSULE DAILY 90 capsule 3    sertraline (ZOLOFT) 25 MG tablet Take 1 tablet by mouth daily 90 tablet 1    sodium bicarbonate 650 MG tablet Take 1 tablet by mouth 2 times daily Take 2 tabs by mouth every 12 hours 180 tablet 3    Insulin Glargine (BASAGLAR KWIKPEN SC) Inject 35 Units into the skin 2 times daily      Cholecalciferol (VITAMIN D3) 10416 UNITS CAPS Take 50,000 Units by mouth once a week 12 capsule 0    fluconazole (DIFLUCAN) 200 MG tablet Take 1 tablet by mouth 2 times daily 180 tablet 3    Insulin Pen Needle 32G X 6 MM MISC Use as directed with insulin, use 5 times daily 500 each 3    Insulin Pen Needle 31G X 6 MM MISC Use as directed with insulin 500 each 3    insulin lispro (HUMALOG KWIKPEN) 100 UNIT/ML injection Inject 15 Units into the skin 3 times daily (before meals) for 90 days. 10units before each meal and sliding scale if over 150 (Patient taking differently: Inject 20 Units into the skin 3 times daily (before meals) 10units before each meal and sliding scale if over 150) 20 Pen 3    Multiple Vitamin (DAILY VITAMIN PO) Take  by mouth.  magnesium oxide (MAG-OX) 400 MG tablet Take 400 mg by mouth daily.  Diphenoxylate-Atropine (LOMOTIL PO) Take  by mouth. 2 before each meal total 6 per day       PREDNISONE Take 5 mg by mouth daily.        No current facility-administered medications for this visit. Orders Placed This Encounter   Procedures    DEXA BONE DENSITY AXIAL SKELETON     Standing Status:   Future     Standing Expiration Date:   10/20/2021    INFLUENZA, QUADV, 3 YRS AND OLDER, IM, MDV, 0.5ML (AFLURIA QUADV)    Lipid, Fasting     Standing Status:   Future     Number of Occurrences:   1     Standing Expiration Date:   10/20/2021    Hepatitis C Antibody     Standing Status:   Future     Number of Occurrences:   1     Standing Expiration Date:   10/20/2021    TSH with Reflex     Standing Status:   Future     Number of Occurrences:   1     Standing Expiration Date:   10/20/2021    Comprehensive Metabolic Panel     Standing Status:   Future     Number of Occurrences:   1     Standing Expiration Date:   10/20/2021    T4, Free     Standing Status:   Future     Number of Occurrences:   1     Standing Expiration Date:   10/20/2021    CBC Auto Differential     Standing Status:   Future     Number of Occurrences:   1     Standing Expiration Date:   10/20/2021    POCT glycosylated hemoglobin (Hb A1C)    HM DIABETES FOOT EXAM       Orders Placed This Encounter   Medications    atorvastatin (LIPITOR) 40 MG tablet     Sig: Take 1 tablet by mouth daily     Dispense:  90 tablet     Refill:  3    fenofibrate (TRICOR) 145 MG tablet     Sig: Take 1 tablet by mouth daily     Dispense:  90 tablet     Refill:  3    omeprazole (PRILOSEC) 40 MG delayed release capsule     Sig: TAKE 1 CAPSULE DAILY     Dispense:  90 capsule     Refill:  3              Return in about 4 weeks (around 11/17/2020).     Dr. Esparza Friday      10/20/20  4:59 PM

## 2020-10-21 PROBLEM — E03.9 HYPOTHYROIDISM: Status: ACTIVE | Noted: 2020-10-01

## 2020-10-21 RX ORDER — LEVOTHYROXINE SODIUM 0.05 MG/1
50 TABLET ORAL DAILY
Qty: 90 TABLET | Refills: 0 | Status: SHIPPED | OUTPATIENT
Start: 2020-10-21 | End: 2020-12-30 | Stop reason: SDUPTHER

## 2020-10-29 RX ORDER — INSULIN GLARGINE 100 [IU]/ML
35 INJECTION, SOLUTION SUBCUTANEOUS 2 TIMES DAILY
Qty: 5 PEN | Refills: 3 | Status: SHIPPED | OUTPATIENT
Start: 2020-10-29

## 2020-10-29 NOTE — TELEPHONE ENCOUNTER
Guanakito Lala is calling in requesting a refill on medication(s):    Requested Prescriptions     Pending Prescriptions Disp Refills    insulin lispro (HUMALOG) 100 UNIT/ML injection vial 1350 Units 2     Sig: Inject 15 Units into the skin 3 times daily (before meals) 10units before each meal and sliding scale if over 150    insulin glargine (BASAGLAR KWIKPEN) 100 UNIT/ML injection pen 5 pen 3     Sig: Inject 35 Units into the skin 2 times daily          Patient's Last Office Visit:  10/20/2020     Patient's Next Visit:  11/17/2020    Pharmacy:  Please send the medication to the pharmacy listed.       Other Comments:

## 2020-10-30 RX ORDER — ROSUVASTATIN CALCIUM 20 MG/1
20 TABLET, COATED ORAL DAILY
Qty: 90 TABLET | Refills: 3 | Status: SHIPPED | OUTPATIENT
Start: 2020-10-30 | End: 2021-11-09 | Stop reason: SDUPTHER

## 2020-11-02 RX ORDER — INSULIN LISPRO 100 [IU]/ML
INJECTION, SOLUTION INTRAVENOUS; SUBCUTANEOUS
Qty: 25 PEN | Refills: 3 | Status: SHIPPED | OUTPATIENT
Start: 2020-11-02

## 2020-11-17 ENCOUNTER — TELEPHONE (OUTPATIENT)
Dept: FAMILY MEDICINE CLINIC | Age: 60
End: 2020-11-17

## 2020-11-17 ENCOUNTER — OFFICE VISIT (OUTPATIENT)
Dept: FAMILY MEDICINE CLINIC | Age: 60
End: 2020-11-17
Payer: COMMERCIAL

## 2020-11-17 VITALS
TEMPERATURE: 98.1 F | WEIGHT: 161 LBS | RESPIRATION RATE: 16 BRPM | BODY MASS INDEX: 25.88 KG/M2 | SYSTOLIC BLOOD PRESSURE: 120 MMHG | HEIGHT: 66 IN | OXYGEN SATURATION: 98 % | DIASTOLIC BLOOD PRESSURE: 70 MMHG | HEART RATE: 108 BPM

## 2020-11-17 DIAGNOSIS — R30.0 DYSURIA: ICD-10-CM

## 2020-11-17 LAB
BILIRUBIN, POC: NORMAL
BLOOD URINE, POC: NORMAL
CLARITY, POC: NORMAL
COLOR, POC: YELLOW
GLUCOSE URINE, POC: NORMAL
KETONES, POC: NORMAL
LEUKOCYTE EST, POC: NORMAL
NITRITE, POC: NORMAL
PH, POC: 5.5
PROTEIN, POC: NORMAL
SPECIFIC GRAVITY, POC: 1.02
UROBILINOGEN, POC: 3.5

## 2020-11-17 PROCEDURE — 99214 OFFICE O/P EST MOD 30 MIN: CPT | Performed by: FAMILY MEDICINE

## 2020-11-17 PROCEDURE — 81002 URINALYSIS NONAUTO W/O SCOPE: CPT | Performed by: FAMILY MEDICINE

## 2020-11-17 RX ORDER — FENOFIBRATE 145 MG/1
1 TABLET, COATED ORAL DAILY
COMMUNITY
End: 2020-11-17 | Stop reason: SDUPTHER

## 2020-11-17 RX ORDER — NITROFURANTOIN 25; 75 MG/1; MG/1
100 CAPSULE ORAL 2 TIMES DAILY
Qty: 20 CAPSULE | Refills: 0 | Status: SHIPPED | OUTPATIENT
Start: 2020-11-17 | End: 2020-11-27

## 2020-11-17 RX ORDER — ICOSAPENT ETHYL 1000 MG/1
2 CAPSULE ORAL 2 TIMES DAILY
COMMUNITY
Start: 2020-11-04

## 2020-11-17 RX ORDER — MAGNESIUM CHLORIDE 71.5 G/G
1 TABLET ORAL 2 TIMES DAILY
COMMUNITY

## 2020-11-17 NOTE — PROGRESS NOTES
Patient: Marii Parrish    YOB: 1960    Date: 11/17/20    Chief Complaint   Patient presents with    Generalized Body Aches     4 week follow up. She states body aches are no better. She had labs done 10/20/2020.  Alopecia     4 week follow. Patient Active Problem List    Diagnosis Date Noted    Hypothyroidism 10/2020    Diabetic nephropathy ([facility] 08/06/2013    Insomnia 07/01/2013    CRF (chronic renal failure) 07/01/2013    Crohn's disease ([facility]     Type 2 diabetes mellitus with stage 4 chronic kidney disease, with long-term current use of insulin (HCC)     Depression     Mixed hyperlipidemia     Essential hypertension     Chronic pancreatitis (HCC)      Overview Note:     replace inactive diagnosis      Allergic rhinitis      Overview Note:     replace inactive diagnosis         Allergies   Allergen Reactions    Iv Dye [Iodides]     Ketorolac Tromethamine        Vitals:    11/17/20 1528   BP: 120/70   Site: Right Upper Arm   Position: Sitting   Cuff Size: Small Adult   Pulse: 108   Resp: 16   Temp: 98.1 °F (36.7 °C)   TempSrc: Oral   SpO2: 98%   Weight: 161 lb (73 kg)   Height: 5' 5.5\" (1.664 m)      Body mass index is 26.38 kg/m². PHQ Scores 6/3/2020 5/15/2018 8/12/2014   PHQ2 Score 2 3 0   PHQ9 Score 2 12 0     Interpretation of Total Score Depression Severity: 1-4 = Minimal depression, 5-9 = Mild depression, 10-14 = Moderate depression, 15-19 = Moderately severe depression, 20-27 = Severe depression      Treatment Adherence:   Medication compliance:  compliant all of the time  Diet compliance:  compliant most of the time  Weight trend: increasing  Current exercise: no regular exercise  Diabetes Mellitus Type 2: Current symptoms/problems include none. Home blood sugar records:  fasting range: 112 yesterday  Any episodes of hypoglycemia? yes -   Eye exam current (within one year): yes  Tobacco history: She  reports that she quit smoking about 10 years ago.  Her smoking use included cigarettes. She started smoking about 45 years ago. She has a 135.00 pack-year smoking history. She has never used smokeless tobacco.   Daily Aspirin? No:   Known diabetic complications: none        HPI    She comes in today to follow-up on her severe fatigue and muscle aching. Her lab work actually came back pretty normal and extremely constant from what she has been in the past.  She just saw her endocrinologist who is happy with how she is going. We had a lengthy discussion face-to-face approximately 26 minutes reviewing all of this. There were times when she was very teary-eyed she is quite anxious that she is not able to see her family because of the pandemic. She does not sleep very well she is very anxious and she cries easily. I think this is a good deal of what is causing her symptoms. No swelling no redness no focal tenderness but she does have some urinary frequency and burning which is developed over the last few days. Review of Systems    Constitutional:positive for fatigue, negative for fever and sweats. HEENT: Negative for eye discharge and vision loss. Negative for ear drainage, hearing loss and nasal drainage. Respiratory: Negative for cough and wheezing. Positive for dyspnea. Cardiovascular:  Negative for chest pain, claudication and irregular heartbeat/palpitations. Gastrointestinal: Negative for abdominal pain, nausea, constipation and positive for diarrhea. Genitourinary: Negative for dysuria,patient ppostmenopausal.  Metabolic/Endocrine: Negative for cold intolerance, heat intolerance, polydipsia and polyphagia. No unintended weight loss or weight gain. Neuro/Psychiatric: Negative for gait disturbance. Negative for psychiatric symptoms. Dermatologic: Negative for pruritus and rash. Musculoskeletal: positive for bone/joint symptoms. No numbness or tingling. No loss of function. Hematology: Negative for bleeding and easy bruising.   Immunology:  Negative for tablet Take 1 tablet by mouth daily 90 tablet 3    omeprazole (PRILOSEC) 40 MG delayed release capsule TAKE 1 CAPSULE DAILY 90 capsule 3    sodium bicarbonate 650 MG tablet Take 1 tablet by mouth 2 times daily Take 2 tabs by mouth every 12 hours 180 tablet 3    Cholecalciferol (VITAMIN D3) 39061 UNITS CAPS Take 50,000 Units by mouth once a week 12 capsule 0    fluconazole (DIFLUCAN) 200 MG tablet Take 1 tablet by mouth 2 times daily 180 tablet 3    Insulin Pen Needle 32G X 6 MM MISC Use as directed with insulin, use 5 times daily 500 each 3    Insulin Pen Needle 31G X 6 MM MISC Use as directed with insulin 500 each 3    Multiple Vitamin (DAILY VITAMIN PO) Take  by mouth.  magnesium oxide (MAG-OX) 400 MG tablet Take 400 mg by mouth daily.  Diphenoxylate-Atropine (LOMOTIL PO) Take  by mouth. 2 before each meal total 6 per day       PREDNISONE Take 5 mg by mouth daily. No current facility-administered medications for this visit. Orders Placed This Encounter   Procedures    Culture, Urine     Standing Status:   Future     Standing Expiration Date:   11/17/2021     Order Specific Question:   Specify (ex-cath, midstream, cysto, etc)? Answer:   unknown    POCT Urinalysis no Micro       Orders Placed This Encounter   Medications    sertraline (ZOLOFT) 50 MG tablet     Sig: Take 1 tablet by mouth daily     Dispense:  90 tablet     Refill:  1    nitrofurantoin, macrocrystal-monohydrate, (MACROBID) 100 MG capsule     Sig: Take 1 capsule by mouth 2 times daily for 10 days     Dispense:  20 capsule     Refill:  0              Return in about 3 months (around 2/17/2021).     Dr. Vani Moreno      11/17/20  4:36 PM

## 2020-11-20 LAB
ORGANISM: ABNORMAL
URINE CULTURE, ROUTINE: ABNORMAL

## 2020-12-30 DIAGNOSIS — E03.9 HYPOTHYROIDISM, UNSPECIFIED TYPE: ICD-10-CM

## 2020-12-30 RX ORDER — LEVOTHYROXINE SODIUM 0.05 MG/1
50 TABLET ORAL DAILY
Qty: 90 TABLET | Refills: 2 | Status: SHIPPED | OUTPATIENT
Start: 2020-12-30 | End: 2021-04-04 | Stop reason: SDUPTHER

## 2021-02-18 ENCOUNTER — OFFICE VISIT (OUTPATIENT)
Dept: FAMILY MEDICINE CLINIC | Age: 61
End: 2021-02-18
Payer: COMMERCIAL

## 2021-02-18 VITALS
TEMPERATURE: 97.9 F | SYSTOLIC BLOOD PRESSURE: 148 MMHG | DIASTOLIC BLOOD PRESSURE: 82 MMHG | HEART RATE: 93 BPM | RESPIRATION RATE: 16 BRPM | OXYGEN SATURATION: 98 % | BODY MASS INDEX: 25.55 KG/M2 | HEIGHT: 66 IN | WEIGHT: 159 LBS

## 2021-02-18 DIAGNOSIS — I10 ESSENTIAL HYPERTENSION: Primary | ICD-10-CM

## 2021-02-18 DIAGNOSIS — E11.22 TYPE 2 DIABETES MELLITUS WITH STAGE 4 CHRONIC KIDNEY DISEASE, WITH LONG-TERM CURRENT USE OF INSULIN (HCC): ICD-10-CM

## 2021-02-18 DIAGNOSIS — E03.9 HYPOTHYROIDISM, UNSPECIFIED TYPE: ICD-10-CM

## 2021-02-18 DIAGNOSIS — N18.4 TYPE 2 DIABETES MELLITUS WITH STAGE 4 CHRONIC KIDNEY DISEASE, WITH LONG-TERM CURRENT USE OF INSULIN (HCC): ICD-10-CM

## 2021-02-18 DIAGNOSIS — Z79.4 TYPE 2 DIABETES MELLITUS WITH STAGE 4 CHRONIC KIDNEY DISEASE, WITH LONG-TERM CURRENT USE OF INSULIN (HCC): ICD-10-CM

## 2021-02-18 LAB — HBA1C MFR BLD: 6.9 %

## 2021-02-18 PROCEDURE — 99213 OFFICE O/P EST LOW 20 MIN: CPT | Performed by: FAMILY MEDICINE

## 2021-02-18 PROCEDURE — 83036 HEMOGLOBIN GLYCOSYLATED A1C: CPT | Performed by: FAMILY MEDICINE

## 2021-02-18 RX ORDER — AMLODIPINE BESYLATE 2.5 MG/1
2.5 TABLET ORAL DAILY
Qty: 60 TABLET | Refills: 1 | Status: SHIPPED | OUTPATIENT
Start: 2021-02-18 | End: 2021-12-14 | Stop reason: ALTCHOICE

## 2021-02-18 SDOH — ECONOMIC STABILITY: FOOD INSECURITY: WITHIN THE PAST 12 MONTHS, YOU WORRIED THAT YOUR FOOD WOULD RUN OUT BEFORE YOU GOT MONEY TO BUY MORE.: NEVER TRUE

## 2021-02-18 SDOH — ECONOMIC STABILITY: INCOME INSECURITY: HOW HARD IS IT FOR YOU TO PAY FOR THE VERY BASICS LIKE FOOD, HOUSING, MEDICAL CARE, AND HEATING?: HARD

## 2021-02-18 NOTE — PROGRESS NOTES
Patient: Lety Dubon (: 1960) is a 57 y.o. female,Established patient, here for evaluation of the following chief complaint(s):  Chief Complaint   Patient presents with    Diabetes     3 month follow up. Mammogram and DEXA scan not done.  Hyperlipidemia     3 month follow up    Hypertension     3 month follow up    Hypothyroidism     3 month follow up   [facility] Maintenance     She declines CT lung screen. Date: 21    Allergies   Allergen Reactions    Iv Dye [Iodides]     Ketorolac Tromethamine     Ketorolac Tromethamine Swelling     She is following up today on her hair loss and her achiness and her depression. She says all that is much better. Vitals:    21 1444 21 1455   BP: (!) 154/80 (!) 148/82   Site: Right Upper Arm Right Upper Arm   Position: Sitting Sitting   Cuff Size: Small Adult Small Adult   Pulse: 93    Resp: 16    Temp: 97.9 °F (36.6 °C)    TempSrc: Oral    SpO2: 98%    Weight: 159 lb (72.1 kg)    Height: 5' 5.75\" (1.67 m)       Body mass index is 25.86 kg/m². PHQ Scores 6/3/2020 5/15/2018 2014   PHQ2 Score 2 3 0   PHQ9 Score 2 12 0     Interpretation of Total Score Depression Severity: 1-4 = Minimal depression, 5-9 = Mild depression, 10-14 = Moderate depression, 15-19 = Moderately severe depression, 20-27 = Severe depression      Treatment Adherence:   Medication compliance:  compliant all of the time  Diet compliance:  compliant most of the time  Weight trend: decreasing  Current exercise: no regular exercise  Diabetes Mellitus Type 2: Current symptoms/problems include none. Home blood sugar records:  fasting range: 102 yesterday  Any episodes of hypoglycemia? no  Eye exam current (within one year): yes  Tobacco history: She  reports that she quit smoking about 10 years ago. Her smoking use included cigarettes. She started smoking about 46 years ago. She has a 135.00 pack-year smoking history.  She has never used smokeless tobacco. Daily Aspirin? No:   Known diabetic complications: none        HPI    Her blood pressure still up and when looking at her notes from her previous nephrology visit her blood pressure has been elevated then she was post to call them and follow-up on them and she did not and that was back in October. She reports that she does not respond well to blood pressure medicine as it is \"hard on her kidneys\" she does not have any specifics about that she only has 3 allergies listed none of which her blood pressure medicines she has no other complaints at this time will be following up with her new endocrinologist next week and her nephrologist in March    Review of Systems    Constitutional: Negative for fatigue, fever and sweats. HEENT: positive for eye discharge and negative for vision loss. Negative for ear drainage, hearing loss and nasal drainage. Respiratory: Negative for cough, dyspnea and wheezing. Cardiovascular:  Negative for chest pain, claudication and irregular heartbeat/palpitations. Gastrointestinal: Negative for abdominal pain, nausea, constipation and positive for diarrhea. Genitourinary: Negative for dysuria, patient postmenopausal.  Metabolic/Endocrine: Negative for cold intolerance, heat intolerance, polydipsia and polyphagia. No unintended weight loss or weight gain. Neuro/Psychiatric: Negative for gait disturbance. Negative for psychiatric symptoms. Dermatologic: Negative for pruritus and rash. Musculoskeletal: positive for bone/joint symptoms. No numbness or tingling. No loss of function. Hematology: Negative for bleeding and easy bruising. Immunology:  Negative for environmental allergies and food allergies. Physical Exam    Patient's medication, allergies, past medical, surgical, social and family histories were reviewed and updated as appropriate.     PHYSICAL EXAM General: Alert oriented pleasant cooperative no acute distress she is a little pale she is not icteric but she is pushed more animated and appears to be more to her baseline personality  Lungs: Clear to auscultation without wheezes rhonchi or rales  Heart: Regular rate and rhythm without murmurs rubs or gallops  Extremities: No rashes or edema. Assessment:   Diagnosis Orders   1. Essential hypertension  amLODIPine (NORVASC) 2.5 MG tablet but add a low-dose of amlodipine to try to get her blood pressure little more under control we will follow up on electrolytes at follow-up as well as her thyroid if that is to take care of by her endocrinologist   2. Type 2 diabetes mellitus with stage 4 chronic kidney disease, with long-term current use of insulin ([facility])  POCT glycosylated hemoglobin (Hb A1C) 6.9 which is fairly good for this patient may be artificially low because she tends to be slightly anemic   3. Hypothyroidism, unspecified type           On this date 02/18/21 I have spent 26 minutes reviewing previous notes, test results and face to face with the patient discussing the diagnosis and importance of compliance with the treatment plan.        Plan:  Current Outpatient Medications   Medication Sig Dispense Refill    amLODIPine (NORVASC) 2.5 MG tablet Take 1 tablet by mouth daily 60 tablet 1    levothyroxine (SYNTHROID) 50 MCG tablet Take 1 tablet by mouth daily 90 tablet 2    Icosapent Ethyl (VASCEPA) 1 g CAPS capsule Take 2 g by mouth 2 times daily      Multiple Vitamins-Minerals (CENTRUM SILVER PO) Take 1 tablet by mouth daily      magnesium cl-calcium carbonate (SLOW-MAG) 71.5-119 MG TBEC tablet Take 1 tablet by mouth 2 times daily      sertraline (ZOLOFT) 50 MG tablet Take 1 tablet by mouth daily 90 tablet 1    insulin lispro, 1 Unit Dial, (HUMALOG KWIKPEN) 100 UNIT/ML SOPN Inject 20 units before breakfast, 22 units before lunch, 26 before dinner 25 pen 3  rosuvastatin (CRESTOR) 20 MG tablet Take 1 tablet by mouth daily 90 tablet 3    insulin glargine (BASAGLAR KWIKPEN) 100 UNIT/ML injection pen Inject 35 Units into the skin 2 times daily 5 pen 3    fenofibrate (TRICOR) 145 MG tablet Take 1 tablet by mouth daily 90 tablet 3    omeprazole (PRILOSEC) 40 MG delayed release capsule TAKE 1 CAPSULE DAILY 90 capsule 3    sodium bicarbonate 650 MG tablet Take 1 tablet by mouth 2 times daily Take 2 tabs by mouth every 12 hours 180 tablet 3    Cholecalciferol (VITAMIN D3) 55256 UNITS CAPS Take 50,000 Units by mouth once a week 12 capsule 0    fluconazole (DIFLUCAN) 200 MG tablet Take 1 tablet by mouth 2 times daily 180 tablet 3    Insulin Pen Needle 32G X 6 MM MISC Use as directed with insulin, use 5 times daily 500 each 3    Insulin Pen Needle 31G X 6 MM MISC Use as directed with insulin 500 each 3    Diphenoxylate-Atropine (LOMOTIL PO) Take  by mouth. 2 before each meal total 6 per day       PREDNISONE Take 5 mg by mouth daily. No current facility-administered medications for this visit. Orders Placed This Encounter   Procedures    POCT glycosylated hemoglobin (Hb A1C)       Orders Placed This Encounter   Medications    amLODIPine (NORVASC) 2.5 MG tablet     Sig: Take 1 tablet by mouth daily     Dispense:  60 tablet     Refill:  1              Return in about 8 weeks (around 4/15/2021). An electronic signature was used to authenticate this note.   Dr. Yvonne Ulrich      2/18/21  3:15 PM

## 2021-04-02 DIAGNOSIS — F32.89 OTHER DEPRESSION: ICD-10-CM

## 2021-04-02 DIAGNOSIS — E03.9 HYPOTHYROIDISM, UNSPECIFIED TYPE: ICD-10-CM

## 2021-04-04 RX ORDER — LEVOTHYROXINE SODIUM 0.05 MG/1
50 TABLET ORAL DAILY
Qty: 90 TABLET | Refills: 2 | Status: SHIPPED | OUTPATIENT
Start: 2021-04-04 | End: 2022-03-01 | Stop reason: SDUPTHER

## 2021-04-13 ENCOUNTER — TELEPHONE (OUTPATIENT)
Dept: FAMILY MEDICINE CLINIC | Age: 61
End: 2021-04-13

## 2021-04-13 ENCOUNTER — VIRTUAL VISIT (OUTPATIENT)
Dept: FAMILY MEDICINE CLINIC | Age: 61
End: 2021-04-13
Payer: COMMERCIAL

## 2021-04-13 DIAGNOSIS — E03.9 HYPOTHYROIDISM, UNSPECIFIED TYPE: ICD-10-CM

## 2021-04-13 DIAGNOSIS — Z79.4 TYPE 2 DIABETES MELLITUS WITH STAGE 4 CHRONIC KIDNEY DISEASE, WITH LONG-TERM CURRENT USE OF INSULIN (HCC): ICD-10-CM

## 2021-04-13 DIAGNOSIS — E11.22 TYPE 2 DIABETES MELLITUS WITH STAGE 4 CHRONIC KIDNEY DISEASE, WITH LONG-TERM CURRENT USE OF INSULIN (HCC): ICD-10-CM

## 2021-04-13 DIAGNOSIS — N18.4 TYPE 2 DIABETES MELLITUS WITH STAGE 4 CHRONIC KIDNEY DISEASE, WITH LONG-TERM CURRENT USE OF INSULIN (HCC): ICD-10-CM

## 2021-04-13 DIAGNOSIS — K50.119 CROHN'S DISEASE OF LARGE INTESTINE WITH COMPLICATION (HCC): Primary | ICD-10-CM

## 2021-04-13 DIAGNOSIS — I10 ESSENTIAL HYPERTENSION: ICD-10-CM

## 2021-04-13 PROCEDURE — 99443 PR PHYS/QHP TELEPHONE EVALUATION 21-30 MIN: CPT | Performed by: FAMILY MEDICINE

## 2021-04-13 NOTE — PROGRESS NOTES
Patient: Marlen Sanderson (: 1960) is a 57 y.o. female,Established patient, here for evaluation of the following chief complaint(s):  Chief Complaint   Patient presents with    Hypertension     follow up. Date: 21    Allergies   Allergen Reactions    Iv Dye [Iodides]     Ketorolac Tromethamine     Ketorolac Tromethamine Swelling       There were no vitals filed for this visit. There is no height or weight on file to calculate BMI. PHQ Scores 6/3/2020 5/15/2018 2014   PHQ2 Score 2 3 0   PHQ9 Score 2 12 0     Interpretation of Total Score Depression Severity: 1-4 = Minimal depression, 5-9 = Mild depression, 10-14 = Moderate depression, 15-19 = Moderately severe depression, 20-27 = Severe depression      Treatment Adherence:   Medication compliance:  compliant all of the time  Diet compliance:  compliant most of the time  Weight trend: stable  Current exercise: no regular exercise  Diabetes Mellitus Type 2: Current symptoms/problems include none. Home blood sugar records:  fasting range: 82  Any episodes of hypoglycemia? yes - 82 this morning  Eye exam current (within one year): yes  Tobacco history: She  reports that she quit smoking about 10 years ago. Her smoking use included cigarettes. She started smoking about 46 years ago. She has a 135.00 pack-year smoking history. She has never used smokeless tobacco.   Daily Aspirin? No:   Known diabetic complications: none        HPI    She is following up today on her blood pressure. She also needs her thyroid rechecked. Her kidney doctors allowed her to stay on the amlodipine and she says her blood pressures are doing better. She was able to come into the office because her Crohn's disease is acting up and she really must stay near a bathroom. She has no fever chills cough nausea or vomiting and she will contact her GI specialist if her symptoms persist or worsen.     Review of Systems    Constitutional: Negative for fatigue, fever and Vitals/Constitutional/EENT/Resp/CV/GI//MS/Neuro/Skin/Heme-Lymph-Imm. Pursuant to the emergency declaration under the [facility], [address] [place] [address] and the [employer] Act, this Virtual Visit was conducted with patient's (and/or legal guardian's) consent, to reduce the patient's risk of exposure to COVID-19 and provide necessary medical care. The patient (and/or legal guardian) has also been advised to contact this office for worsening conditions or problems, and seek emergency medical treatment and/or call 911 if deemed necessary. Physical Exam    Not able to be done as this was a phone visit. Patient was alert, oriented, appropriate, not SOB with talking and not in distress. Assessment:   Diagnosis Orders   1. Crohn's disease of large intestine with complication ([facility] [address]  Comprehensive Metabolic Panel   2. Type 2 diabetes mellitus with stage 4 chronic kidney disease, with long-term current use of insulin ([facility])  Comprehensive Metabolic Panel   3. Essential hypertension  Comprehensive Metabolic Panel   4.  Hypothyroidism, unspecified type  TSH Without Reflex            Plan:  Current Outpatient Medications   Medication Sig Dispense Refill    sertraline (ZOLOFT) 50 MG tablet Take 1 tablet by mouth daily 90 tablet 1    levothyroxine (SYNTHROID) 50 MCG tablet Take 1 tablet by mouth daily 90 tablet 2    amLODIPine (NORVASC) 2.5 MG tablet Take 1 tablet by mouth daily 60 tablet 1    Icosapent Ethyl (VASCEPA) 1 g CAPS capsule Take 2 g by mouth 2 times daily      Multiple Vitamins-Minerals (CENTRUM SILVER PO) Take 1 tablet by mouth daily      magnesium cl-calcium carbonate (SLOW-MAG) 71.5-119 MG TBEC tablet Take 1 tablet by mouth 2 times daily      insulin lispro, 1 Unit Dial, (HUMALOG KWIKPEN) 100 UNIT/ML SOPN Inject 20 units before breakfast, 22 units before lunch, 26 before dinner 25 pen 3    rosuvastatin

## 2021-07-20 ENCOUNTER — TELEPHONE (OUTPATIENT)
Dept: FAMILY MEDICINE CLINIC | Age: 61
End: 2021-07-20

## 2021-07-20 NOTE — TELEPHONE ENCOUNTER
Patient is asking if you can please call her. I advised that you don't normally call patient's and asked if I could help her with something or take a message.   She said she needs to speak to the doctor and it is personal.  [phone]

## 2021-07-20 NOTE — TELEPHONE ENCOUNTER
I called her phone number but there was no answer. I did leave a message as negative play phone tag. Perhaps she could do a virtual visit with me if necessary?

## 2021-07-27 NOTE — TELEPHONE ENCOUNTER
Patient called again. She missed your call because she is out of town. She said you had recommended a new pcp to her and she has a specific question she would like to ask you about that physician. She said there are very special circumstances. She is asking is you can please call her on her cell phone [phone].   Thank you

## 2021-10-19 DIAGNOSIS — F32.89 OTHER DEPRESSION: ICD-10-CM

## 2021-10-19 NOTE — TELEPHONE ENCOUNTER
Patient requesting medication refill.  Please approve or deny this request.    Rx requested:  Requested Prescriptions     Pending Prescriptions Disp Refills    sertraline (ZOLOFT) 50 MG tablet 90 tablet 1     Sig: Take 1 tablet by mouth daily         Last Office Visit:   Visit date not found      Next Visit Date:  Future Appointments   Date Time Provider Sekou Tamez   10/26/2021  1:15 PM Joce Sotomayor DO [address] [place] [address] 7

## 2021-11-08 NOTE — TELEPHONE ENCOUNTER
Patient requesting medication refill. Please approve or deny this request.    Rx requested:  Requested Prescriptions     Pending Prescriptions Disp Refills    rosuvastatin (CRESTOR) 20 MG tablet 90 tablet 3     Sig: Take 1 tablet by mouth daily         Last Office Visit:   4/13/2021  Dr. Gera Lilly      Next Visit Date:  No future appointments.

## 2021-11-09 RX ORDER — ROSUVASTATIN CALCIUM 20 MG/1
20 TABLET, COATED ORAL DAILY
Qty: 90 TABLET | Refills: 0 | Status: SHIPPED | OUTPATIENT
Start: 2021-11-09 | End: 2022-03-01 | Stop reason: SDUPTHER

## 2021-11-17 DIAGNOSIS — E78.2 MIXED HYPERLIPIDEMIA: ICD-10-CM

## 2021-11-17 NOTE — TELEPHONE ENCOUNTER
Patient is going out of town in the morning. She is asking if this script can please be filled today. She will not be back until 12/8.     Please approve or deny this request.    Rx requested:  Requested Prescriptions     Pending Prescriptions Disp Refills    fenofibrate (TRICOR) 145 MG tablet 90 tablet 3     Sig: Take 1 tablet by mouth daily         Last Office Visit:   Visit date not found      Next Visit Date:  Future Appointments   Date Time Provider Sekou Tamez   12/13/2021  1:00 PM Fior Mosher DO [address] [place] [address] 7

## 2021-11-18 RX ORDER — FENOFIBRATE 145 MG/1
145 TABLET, COATED ORAL DAILY
Qty: 90 TABLET | Refills: 0 | Status: SHIPPED | OUTPATIENT
Start: 2021-11-18 | End: 2022-03-03 | Stop reason: SDUPTHER

## 2021-12-14 ENCOUNTER — OFFICE VISIT (OUTPATIENT)
Dept: FAMILY MEDICINE CLINIC | Age: 61
End: 2021-12-14
Payer: COMMERCIAL

## 2021-12-14 VITALS
SYSTOLIC BLOOD PRESSURE: 128 MMHG | DIASTOLIC BLOOD PRESSURE: 70 MMHG | BODY MASS INDEX: 27.26 KG/M2 | OXYGEN SATURATION: 99 % | WEIGHT: 169.6 LBS | TEMPERATURE: 96.2 F | HEIGHT: 66 IN | HEART RATE: 104 BPM

## 2021-12-14 DIAGNOSIS — N18.4 TYPE 2 DIABETES MELLITUS WITH STAGE 4 CHRONIC KIDNEY DISEASE, WITH LONG-TERM CURRENT USE OF INSULIN (HCC): ICD-10-CM

## 2021-12-14 DIAGNOSIS — R53.83 FATIGUE, UNSPECIFIED TYPE: ICD-10-CM

## 2021-12-14 DIAGNOSIS — Z79.4 TYPE 2 DIABETES MELLITUS WITH STAGE 4 CHRONIC KIDNEY DISEASE, WITH LONG-TERM CURRENT USE OF INSULIN (HCC): ICD-10-CM

## 2021-12-14 DIAGNOSIS — E03.9 HYPOTHYROIDISM, UNSPECIFIED TYPE: ICD-10-CM

## 2021-12-14 DIAGNOSIS — E11.22 TYPE 2 DIABETES MELLITUS WITH STAGE 4 CHRONIC KIDNEY DISEASE, WITH LONG-TERM CURRENT USE OF INSULIN (HCC): ICD-10-CM

## 2021-12-14 DIAGNOSIS — I10 ESSENTIAL HYPERTENSION: ICD-10-CM

## 2021-12-14 DIAGNOSIS — Z87.891 PERSONAL HISTORY OF TOBACCO USE: ICD-10-CM

## 2021-12-14 DIAGNOSIS — Z12.31 SCREENING MAMMOGRAM, ENCOUNTER FOR: ICD-10-CM

## 2021-12-14 DIAGNOSIS — Z76.89 ENCOUNTER TO ESTABLISH CARE WITH NEW DOCTOR: Primary | ICD-10-CM

## 2021-12-14 DIAGNOSIS — Z23 NEED FOR INFLUENZA VACCINATION: ICD-10-CM

## 2021-12-14 LAB
ALBUMIN SERPL-MCNC: 4 G/DL (ref 3.5–4.6)
ALP BLD-CCNC: 144 U/L (ref 40–130)
ALT SERPL-CCNC: 32 U/L (ref 0–33)
ANION GAP SERPL CALCULATED.3IONS-SCNC: 16 MEQ/L (ref 9–15)
AST SERPL-CCNC: 35 U/L (ref 0–35)
BASOPHILS ABSOLUTE: 0.1 K/UL (ref 0–0.2)
BASOPHILS RELATIVE PERCENT: 1 %
BILIRUB SERPL-MCNC: <0.2 MG/DL (ref 0.2–0.7)
BUN BLDV-MCNC: 28 MG/DL (ref 8–23)
CALCIUM SERPL-MCNC: 9.2 MG/DL (ref 8.5–9.9)
CHLORIDE BLD-SCNC: 98 MEQ/L (ref 95–107)
CHOLESTEROL, FASTING: 205 MG/DL (ref 0–199)
CO2: 22 MEQ/L (ref 20–31)
CREAT SERPL-MCNC: 1.52 MG/DL (ref 0.5–0.9)
EOSINOPHILS ABSOLUTE: 0.2 K/UL (ref 0–0.7)
EOSINOPHILS RELATIVE PERCENT: 2 %
GFR AFRICAN AMERICAN: 41.8
GFR NON-AFRICAN AMERICAN: 34.6
GLOBULIN: 3.8 G/DL (ref 2.3–3.5)
GLUCOSE BLD-MCNC: 107 MG/DL (ref 70–99)
HBA1C MFR BLD: 7.5 %
HCT VFR BLD CALC: 32.2 % (ref 37–47)
HDLC SERPL-MCNC: 48 MG/DL (ref 40–59)
HEMOGLOBIN: 10.7 G/DL (ref 12–16)
LDL CHOLESTEROL CALCULATED: ABNORMAL MG/DL (ref 0–129)
LYMPHOCYTES ABSOLUTE: 1.2 K/UL (ref 1–4.8)
LYMPHOCYTES RELATIVE PERCENT: 13 %
MCH RBC QN AUTO: 30.8 PG (ref 27–31.3)
MCHC RBC AUTO-ENTMCNC: 33.3 % (ref 33–37)
MCV RBC AUTO: 92.5 FL (ref 82–100)
MONOCYTES ABSOLUTE: 1 K/UL (ref 0.2–0.8)
MONOCYTES RELATIVE PERCENT: 10.5 %
NEUTROPHILS ABSOLUTE: 6.6 K/UL (ref 1.4–6.5)
NEUTROPHILS RELATIVE PERCENT: 73 %
PDW BLD-RTO: 13.9 % (ref 11.5–14.5)
PLATELET # BLD: 298 K/UL (ref 130–400)
PLATELET SLIDE REVIEW: NORMAL
POTASSIUM SERPL-SCNC: 4.1 MEQ/L (ref 3.4–4.9)
RBC # BLD: 3.48 M/UL (ref 4.2–5.4)
RBC # BLD: NORMAL 10*6/UL
SODIUM BLD-SCNC: 136 MEQ/L (ref 135–144)
TOTAL PROTEIN: 7.8 G/DL (ref 6.3–8)
TRIGLYCERIDE, FASTING: 515 MG/DL (ref 0–150)
TSH REFLEX: 2.33 UIU/ML (ref 0.44–3.86)
WBC # BLD: 9 K/UL (ref 4.8–10.8)

## 2021-12-14 PROCEDURE — G0296 VISIT TO DETERM LDCT ELIG: HCPCS | Performed by: STUDENT IN AN ORGANIZED HEALTH CARE EDUCATION/TRAINING PROGRAM

## 2021-12-14 PROCEDURE — 99214 OFFICE O/P EST MOD 30 MIN: CPT | Performed by: STUDENT IN AN ORGANIZED HEALTH CARE EDUCATION/TRAINING PROGRAM

## 2021-12-14 PROCEDURE — 3051F HG A1C>EQUAL 7.0%<8.0%: CPT | Performed by: STUDENT IN AN ORGANIZED HEALTH CARE EDUCATION/TRAINING PROGRAM

## 2021-12-14 PROCEDURE — 90471 IMMUNIZATION ADMIN: CPT | Performed by: STUDENT IN AN ORGANIZED HEALTH CARE EDUCATION/TRAINING PROGRAM

## 2021-12-14 PROCEDURE — 83036 HEMOGLOBIN GLYCOSYLATED A1C: CPT | Performed by: STUDENT IN AN ORGANIZED HEALTH CARE EDUCATION/TRAINING PROGRAM

## 2021-12-14 PROCEDURE — 90674 CCIIV4 VAC NO PRSV 0.5 ML IM: CPT | Performed by: STUDENT IN AN ORGANIZED HEALTH CARE EDUCATION/TRAINING PROGRAM

## 2021-12-14 ASSESSMENT — ENCOUNTER SYMPTOMS
WHEEZING: 0
SHORTNESS OF BREATH: 0
SORE THROAT: 0
EYE DISCHARGE: 0
RHINORRHEA: 0
COUGH: 0

## 2021-12-14 NOTE — PROGRESS NOTES
Vaccine Information Sheet, \"Influenza - Inactivated\"  given to Ashli Rivera, or parent/legal guardian of  Ashli Rivera and verbalized understanding. Patient responses:    Have you ever had a reaction to a flu vaccine? No  Are you able to eat eggs without adverse effects? Yes  Do you have any current illness? No  Have you ever had Guillian Corydon Syndrome? No    Flu vaccine given per order. Please see immunization tab. Low Dose CT (LDCT) Lung Screening criteria met:     Age 55-77(Medicare) or 50-80 ([facility])   Pack year smoking >30 (Medicare) or >20 ([facility])   Still smoking or less than 15 year since quit   No sign or symptoms of lung cancer   > 11 months since last LDCT     Risks and benefits of lung cancer screening with LDCT scans discussed:    Significance of positive screen - False-positive LDCT results often occur. 95% of all positive results do not lead to a diagnosis of cancer. Usually further imaging can resolve most false-positive results; however, some patients may require invasive procedures. Over diagnosis risk - 10% to 12% of screen-detected lung cancer cases are over diagnosedthat is, the cancer would not have been detected in the patient's lifetime without the screening. Need for follow up screens annually to continue lung cancer screening effectiveness     Risks associated with radiation from annual LDCT- Radiation exposure is about the same as for a mammogram, which is about 1/3 of the annual background radiation exposure from everyday life. Starting screening at age 54 is not likely to increase cancer risk from radiation exposure. Patients with comorbidities resulting in life expectancy of < 10 years, or that would preclude treatment of an abnormality identified on CT, should not be screened due to lack of benefit.     To obtain maximal benefit from this screening, smoking cessation and long-term abstinence from smoking is critical

## 2021-12-14 NOTE — PROGRESS NOTES
Subjective  Thalia Chester, 57 y.o. female presents today with:  Chief Complaint   Patient presents with   [address]     Was seeing Dr. Katy Darby prior.  Hypertension     Occasionally checks BP at home. Denies any chest pain or heart palpitations. Does not follow cardiology.  Diabetes     Last A1C was 6.9 on 2/18/21. Checks blood sugars twice daily. Sees endocrinology at [facility].  Hypothyroidism    Depression     Feels her mood is stable. Denies feeling down & depressed.  Hyperlipidemia    Fatigue     States she has been more fatigued than her usual for the past couple of months. States she cant even take a shower without having to sit down. Would like to discuss this. HPI    Patient with appointment to establish care. Was previously seeing Dr. Katy Darby. Patient follows with multiple specialists including endocrinology, gastroenterology, nephrology and infectious disease. She sees all of the specialist through [facility]. Patient with acute complaint today of fatigue. She states that over the last several months she has noticed increased fatigue. She states that she feels weak. She states that she is unable to take a shower due to the weakness. No chest pain or shortness of breath with it. She feels shaky at times when it happens. She states she has to sit down while she is cooking dinner at times. She has really never felt like this before except for when she was anemic or when she was dehydrated. She does struggle with dehydration due to her Crohn's disease. She states that she does work on trying to drink enough water. She has not had her blood levels checked in some time. She denies that any changes to her hair skin or nails. No heat or cold intolerance. Patient also with hypothyroidism. She is due for TSH check. She is on 50 mcg of levothyroxine daily. No side effects from the medication. Patient also with essential hypertension.   She is normotensive in daily 90 tablet 0    rosuvastatin (CRESTOR) 20 MG tablet Take 1 tablet by mouth daily 90 tablet 0    sertraline (ZOLOFT) 50 MG tablet Take 1 tablet by mouth daily 90 tablet 0    Insulin Pen Needle 32G X 6 MM MISC Use as directed with insulin, use 5 times daily 500 each 3    levothyroxine (SYNTHROID) 50 MCG tablet Take 1 tablet by mouth daily 90 tablet 2    Icosapent Ethyl (VASCEPA) 1 g CAPS capsule Take 2 g by mouth 2 times daily      Multiple Vitamins-Minerals (CENTRUM SILVER PO) Take 1 tablet by mouth daily      magnesium cl-calcium carbonate (SLOW-MAG) 71.5-119 MG TBEC tablet Take 1 tablet by mouth 2 times daily      insulin lispro, 1 Unit Dial, (HUMALOG KWIKPEN) 100 UNIT/ML SOPN Inject 20 units before breakfast, 22 units before lunch, 26 before dinner 25 pen 3    insulin glargine (BASAGLAR KWIKPEN) 100 UNIT/ML injection pen Inject 35 Units into the skin 2 times daily 5 pen 3    omeprazole (PRILOSEC) 40 MG delayed release capsule TAKE 1 CAPSULE DAILY 90 capsule 3    sodium bicarbonate 650 MG tablet Take 1 tablet by mouth 2 times daily Take 2 tabs by mouth every 12 hours 180 tablet 3    Cholecalciferol (VITAMIN D3) 06440 UNITS CAPS Take 50,000 Units by mouth once a week 12 capsule 0    Insulin Pen Needle 31G X 6 MM MISC Use as directed with insulin 500 each 3    Diphenoxylate-Atropine (LOMOTIL PO) Take  by mouth. 2 before each meal total 6 per day       PREDNISONE Take 5 mg by mouth daily. No current facility-administered medications for this visit. PMH, Surgical Hx, Family Hx, and Social Hx reviewed and updated. Health Maintenance reviewed. Objective    Vitals:    12/14/21 1102   BP: 128/70   Pulse: 104   Temp: 96.2 °F (35.7 °C)   SpO2: 99%   Weight: 169 lb 9.6 oz (76.9 kg)   Height: 5' 5.75\" (1.67 m)       Physical Exam  Vitals reviewed. Constitutional:       General: She is not in acute distress. HENT:      Head: Normocephalic and atraumatic.    Eyes:      Conjunctiva/sclera: Conjunctivae normal.   Neck:      Thyroid: No thyromegaly or thyroid tenderness. Cardiovascular:      Rate and Rhythm: Normal rate and regular rhythm. Heart sounds: No murmur heard. No friction rub. No gallop. Pulmonary:      Effort: Pulmonary effort is normal.      Breath sounds: Normal breath sounds. No wheezing, rhonchi or rales. Musculoskeletal:         General: No swelling or deformity. Cervical back: Normal range of motion and neck supple. Right lower leg: No edema. Left lower leg: No edema. Lymphadenopathy:      Cervical: No cervical adenopathy. Skin:     General: Skin is warm and dry. Findings: No rash. Neurological:      General: No focal deficit present. Mental Status: She is alert. Gait: Gait is intact. Psychiatric:         Mood and Affect: Mood normal.         Behavior: Behavior normal.          Results for POC orders placed in visit on 12/14/21   POCT glycosylated hemoglobin (Hb A1C)   Result Value Ref Range    Hemoglobin A1C 7.5 %     Assessment & Plan     1. Encounter to establish care with new doctor  Patient with appointment to establish care with new physician. Was previously seeing Dr. Joan Cross. 2. Fatigue, unspecified type  Patient with unspecified fatigue. Will obtain CBC and vitamin D level along with thyroid test.  We will continue to monitor. If there is nothing organically wrong, would consider referral to occupational therapy to hopefully help build strength. I also encouraged her to talk to her gastroenterologist about increasing her fluid intake without worsening her GI issues. She will talk to her specialist.  All questions answered. Follow-up as scheduled. - CBC Auto Differential; Future  - Vitamin D 25 Hydroxy; Future    3. Hypothyroidism, unspecified type  Stable, chronic. She is on 50 mcg of levothyroxine daily. She is due for TSH recheck. This could also be contributing to her ongoing fatigue.   We will see where she is at and adjust medications accordingly.  - TSH with Reflex; Future    4. Essential hypertension  Stable, chronic. Not currently on medications. She also follows with nephrology. She is due for labs including lipid panel and metabolic panel. Will call with results when returned. - Lipid, Fasting; Future  - Comprehensive Metabolic Panel; Future    5. Type 2 diabetes mellitus with stage 4 chronic kidney disease, with long-term current use of insulin (HCC)  Stable, chronic. On insulin with endocrinology. A1c today is 7.5%. She does see endocrinologist later this month. Will obtain labs. Call when we have results. She is instructed to keep appointment with endocrinology as scheduled. - Comprehensive Metabolic Panel; Future  - POCT glycosylated hemoglobin (Hb A1C)    6. Screening mammogram, encounter for  Mammogram ordered. Will call with results when returned. - KRISTY DIGITAL SCREEN W OR WO CAD BILATERAL; Future    7. Personal history of tobacco use  Low-dose CT of the chest ordered. Will call with results when returned  - NE VISIT TO DISCUSS LUNG CA SCREEN W LDCT  - CT Lung Screen (Annual); Future    8. Need for influenza vaccination  Flu shot given  - INFLUENZA, MDCK QUADV, 2 YRS AND OLDER, IM, PF, PREFILL SYR OR SDV, 0.5ML (FLUCELVAX QUADV, PF)    Orders Placed This Encounter   Procedures    CT Lung Screen (Annual)     Age: Patient is 57 y.o.     Smoking History: Social History    Tobacco Use      Smoking status: Former Smoker        Packs/day: 3.00        Years: 45.00        Pack years: 135        Types: Cigarettes        Start date:         Quit date: 7/3/2010        Years since quittin.4      Smokeless tobacco: Never Used    Alcohol use: No    Drug use: No   Pack years: 135    Date of last lung cancer screening: No previous lung cancer screening exam     Standing Status:   Future     Standing Expiration Date:   2023     Order Specific Question:   Is there documentation of shared decision making? Answer:   Yes     Order Specific Question:   Is this a low dose CT or a routine CT? Answer:   Low Dose CT [1]     Order Specific Question:   Is this the first (baseline) CT or an annual exam?     Answer: Annual [2]     Order Specific Question:   Does the patient show any signs or symptoms of lung cancer? Answer:   No     Order Specific Question:   Smoking Status? Answer: Former Smoker [4]     Order Specific Question:   Date quit smoking? (must be within 15 years)     Answer:   7/3/2010     Order Specific Question:   Smoking packs per day? Answer:   3     Order Specific Question:   Years smoking? Answer:   39    KRISTY DIGITAL SCREEN W OR WO CAD BILATERAL     Standing Status:   Future     Standing Expiration Date:   12/14/2022     Order Specific Question:   Reason for exam:     Answer:   screening mammogram    INFLUENZA, MDCK QUADV, 2 YRS AND OLDER, IM, PF, PREFILL SYR OR SDV, 0.5ML (FLUCELVAX QUADV, PF)    Lipid, Fasting     Standing Status:   Future     Number of Occurrences:   1     Standing Expiration Date:   12/14/2022    TSH with Reflex     Standing Status:   Future     Number of Occurrences:   1     Standing Expiration Date:   12/14/2022    Comprehensive Metabolic Panel     Standing Status:   Future     Number of Occurrences:   1     Standing Expiration Date:   12/14/2022    CBC Auto Differential     Standing Status:   Future     Number of Occurrences:   1     Standing Expiration Date:   12/14/2022    Vitamin D 25 Hydroxy     Standing Status:   Future     Number of Occurrences:   1     Standing Expiration Date:   12/14/2022    POCT glycosylated hemoglobin (Hb A1C)    MS VISIT TO DISCUSS LUNG CA SCREEN W LDCT     No orders of the defined types were placed in this encounter.     Medications Discontinued During This Encounter   Medication Reason    fluconazole (DIFLUCAN) 200 MG tablet Therapy completed    amLODIPine (NORVASC) 2.5 MG tablet Therapy completed     Return in about 6 weeks (around 1/25/2022) for wwe with pap. Reviewed with the patient: current clinical status,medications, activities and diet. Side effects, adverse effects of themedication prescribed today, as well as treatment plan/ rationale and result expectations have been discussed with the patient who expresses understanding and desires to proceed. Close follow up to evaluate treatment results and for coordination of care. I have reviewed the patient's medical history in detail and updated the computerized patient record. Please note, this report has been partially produced using speech recognition software and may cause  and /or contain errors related to that system including grammar, punctuation and spelling as well as words and phrases that may seem inappropriate. If there are questions or concerns please feel free to contact me to clarify.     Joce Sotomayor, DO

## 2021-12-15 LAB — VITAMIN D 25-HYDROXY: 20.4 NG/ML (ref 30–100)

## 2022-01-11 RX ORDER — OMEPRAZOLE 40 MG/1
CAPSULE, DELAYED RELEASE ORAL
Qty: 90 CAPSULE | Refills: 3 | Status: SHIPPED | OUTPATIENT
Start: 2022-01-11

## 2022-01-11 NOTE — TELEPHONE ENCOUNTER
Patient requesting medication refill.  Please approve or deny this request.    Rx requested:  Requested Prescriptions     Pending Prescriptions Disp Refills    omeprazole (PRILOSEC) 40 MG delayed release capsule 90 capsule 3     Sig: TAKE 1 CAPSULE DAILY         Last Office Visit:   12/14/2021      Next Visit Date:  Future Appointments   Date Time Provider Sekou Tamez   1/25/2022  1:00 PM Genaro Gabriel DO [address] [place] [address] 7

## 2022-01-23 DIAGNOSIS — F32.89 OTHER DEPRESSION: ICD-10-CM

## 2022-01-24 ENCOUNTER — TELEPHONE (OUTPATIENT)
Dept: FAMILY MEDICINE CLINIC | Age: 62
End: 2022-01-24

## 2022-01-25 ENCOUNTER — OFFICE VISIT (OUTPATIENT)
Dept: FAMILY MEDICINE CLINIC | Age: 62
End: 2022-01-25
Payer: COMMERCIAL

## 2022-01-25 VITALS
WEIGHT: 170 LBS | OXYGEN SATURATION: 99 % | DIASTOLIC BLOOD PRESSURE: 80 MMHG | BODY MASS INDEX: 27.32 KG/M2 | TEMPERATURE: 96.9 F | HEIGHT: 66 IN | HEART RATE: 98 BPM | SYSTOLIC BLOOD PRESSURE: 158 MMHG

## 2022-01-25 DIAGNOSIS — N30.01 ACUTE CYSTITIS WITH HEMATURIA: ICD-10-CM

## 2022-01-25 DIAGNOSIS — E28.319 EARLY MENOPAUSE: ICD-10-CM

## 2022-01-25 DIAGNOSIS — Z01.419 WELL WOMAN EXAM WITH ROUTINE GYNECOLOGICAL EXAM: ICD-10-CM

## 2022-01-25 DIAGNOSIS — F32.89 OTHER DEPRESSION: ICD-10-CM

## 2022-01-25 DIAGNOSIS — Z01.419 WELL WOMAN EXAM WITH ROUTINE GYNECOLOGICAL EXAM: Primary | ICD-10-CM

## 2022-01-25 LAB
BILIRUBIN, POC: NEGATIVE
BLOOD URINE, POC: NORMAL
CLARITY, POC: NORMAL
COLOR, POC: YELLOW
GLUCOSE URINE, POC: NEGATIVE
KETONES, POC: NEGATIVE
LEUKOCYTE EST, POC: NORMAL
NITRITE, POC: NEGATIVE
PH, POC: 5.5
PROTEIN, POC: >=300
SPECIFIC GRAVITY, POC: >=1.03
UROBILINOGEN, POC: 0.2

## 2022-01-25 PROCEDURE — 99396 PREV VISIT EST AGE 40-64: CPT | Performed by: STUDENT IN AN ORGANIZED HEALTH CARE EDUCATION/TRAINING PROGRAM

## 2022-01-25 PROCEDURE — 81003 URINALYSIS AUTO W/O SCOPE: CPT | Performed by: STUDENT IN AN ORGANIZED HEALTH CARE EDUCATION/TRAINING PROGRAM

## 2022-01-25 RX ORDER — SULFAMETHOXAZOLE AND TRIMETHOPRIM 800; 160 MG/1; MG/1
1 TABLET ORAL 2 TIMES DAILY
Qty: 14 TABLET | Refills: 0 | Status: SHIPPED | OUTPATIENT
Start: 2022-01-25 | End: 2022-02-01

## 2022-01-25 ASSESSMENT — ENCOUNTER SYMPTOMS
ABDOMINAL PAIN: 0
VOMITING: 0
DIARRHEA: 0
BACK PAIN: 0
COUGH: 0
ABDOMINAL DISTENTION: 0
WHEEZING: 0
NAUSEA: 0
SHORTNESS OF BREATH: 0

## 2022-01-25 ASSESSMENT — PATIENT HEALTH QUESTIONNAIRE - PHQ9
7. TROUBLE CONCENTRATING ON THINGS, SUCH AS READING THE NEWSPAPER OR WATCHING TELEVISION: 0
10. IF YOU CHECKED OFF ANY PROBLEMS, HOW DIFFICULT HAVE THESE PROBLEMS MADE IT FOR YOU TO DO YOUR WORK, TAKE CARE OF THINGS AT HOME, OR GET ALONG WITH OTHER PEOPLE: 0
1. LITTLE INTEREST OR PLEASURE IN DOING THINGS: 1
5. POOR APPETITE OR OVEREATING: 3
3. TROUBLE FALLING OR STAYING ASLEEP: 3
8. MOVING OR SPEAKING SO SLOWLY THAT OTHER PEOPLE COULD HAVE NOTICED. OR THE OPPOSITE, BEING SO FIGETY OR RESTLESS THAT YOU HAVE BEEN MOVING AROUND A LOT MORE THAN USUAL: 0
9. THOUGHTS THAT YOU WOULD BE BETTER OFF DEAD, OR OF HURTING YOURSELF: 0
4. FEELING TIRED OR HAVING LITTLE ENERGY: 3
6. FEELING BAD ABOUT YOURSELF - OR THAT YOU ARE A FAILURE OR HAVE LET YOURSELF OR YOUR FAMILY DOWN: 0
SUM OF ALL RESPONSES TO PHQ9 QUESTIONS 1 & 2: 1
2. FEELING DOWN, DEPRESSED OR HOPELESS: 0
SUM OF ALL RESPONSES TO PHQ QUESTIONS 1-9: 10

## 2022-01-25 ASSESSMENT — COLUMBIA-SUICIDE SEVERITY RATING SCALE - C-SSRS
2. HAVE YOU ACTUALLY HAD ANY THOUGHTS OF KILLING YOURSELF?: NO
1. WITHIN THE PAST MONTH, HAVE YOU WISHED YOU WERE DEAD OR WISHED YOU COULD GO TO SLEEP AND NOT WAKE UP?: NO
6. HAVE YOU EVER DONE ANYTHING, STARTED TO DO ANYTHING, OR PREPARED TO DO ANYTHING TO END YOUR LIFE?: NO

## 2022-01-25 NOTE — PROGRESS NOTES
Postmenopausal Annual    Subjective:     Geovanna Jimenez is a 60y.o. year old female. , all vaginal, 2 boys 4 girls. female who presents today for her annual well woman exam.  The patient is not sexuallyactive. The patient has never been taking hormone replacement therapy. Patient reports post-menopausal vaginal bleeding within the past couple months, Bleeding is not cyclic, spotting. Previous work up: endometrial biopsy: negative, D & C: negative. The patient has regular exercise: occasional     She does report increased urinary frequency and urgency. No dysuria. No hematuria noted. No abdominal pain, back pain. No fevers or chills.     Past Medical History:   Diagnosis Date    Allergic rhinitis     CRF (chronic renal failure) 2013    [facility]'s disease ([facility] [address]     Depression     Histoplasmosis     bone marrow    Hyperlipidemia     Hypertension     Hypothyroidism 10/2020    Pancreatitis chronic     Renal failure, chronic     Type II or unspecified type diabetes mellitus without mention of complication, not stated as uncontrolled      Past Surgical History:   Procedure Laterality Date    CARPAL TUNNEL RELEASE  2017    DR SARAVIA,  RT    CHOLECYSTECTOMY      TUBAL LIGATION       Family History   Problem Relation Age of Onset    Diabetes Mother     Coronary Art Dis Mother     Heart Failure Mother     Cancer Father      Social History     Socioeconomic History    Marital status:      Spouse name: Not on file    Number of children: Not on file    Years of education: Not on file    Highest education level: Not on file   Occupational History    Not on file   Tobacco Use    Smoking status: Former Smoker     Packs/day: 3.00     Years: 45.00     Pack years: 135.00     Types: Cigarettes     Start date:      Quit date: 7/3/2010     Years since quittin.5    Smokeless tobacco: Never Used   Substance and Sexual Activity    Alcohol use: No    Drug use: No    Sexual activity: Not Currently   Other Topics Concern    Not on file   Social History Narrative    Not on file     Social Determinants of Health     Financial Resource Strain: High Risk    Difficulty of Paying Living Expenses: Hard   Food Insecurity: No Food Insecurity    Worried About Running Out of Food in the Last Year: Never true    Kathy of Food in the Last Year: Never true   Transportation Needs: No Transportation Needs    Lack of Transportation (Medical): No    Lack of Transportation (Non-Medical): No   Physical Activity:     Days of Exercise per Week: Not on file    Minutes of Exercise per Session: Not on file   Stress:     Feeling of Stress : Not on file   Social Connections:     Frequency of Communication with Friends and Family: Not on file    Frequency of Social Gatherings with Friends and Family: Not on file    Attends Buddhist Services: Not on file    Active Member of [address] [place] [address] [employer] or Organizations: Not on file    Attends Club or Organization Meetings: Not on file    Marital Status: Not on file   Intimate Partner Violence:     Fear of Current or Ex-Partner: Not on file    Emotionally Abused: Not on file    Physically Abused: Not on file    Sexually Abused: Not on file   Housing Stability:     Unable to Pay for Housing in the Last Year: Not on file    Number of [place] in the Last Year: Not on file    Unstable Housing in the Last Year: Not on file     Last mammogram in the past, declines now. Breast cancer risk factors : none    Last Pap: unsure, no abnormal     No LMP recorded. Patient is postmenopausal.    Age at menopause onset: 35  Menopausal symptom assessment:  Vasomotor symptoms: not now  Urinary incontinence &  symptoms: no incontinence. No issues with this  Sexual dysfunction: not currently sexually active. Present Hormonalmedications: none, was previously on vaginal estrogen.      Osteoporosis risk assessment : early menopause   Last bone mineral density : never History of abnormal lipids: yes   Hypertension yes  Stroke/Jeff    Yearly flu vaccine recommended. Colonoscopy up-to-date    Review of Systems  Review of Systems   Constitutional: Negative for chills, fatigue, fever and unexpected weight change. Respiratory: Negative for cough, shortness of breath and wheezing. Cardiovascular: Negative for chest pain, palpitations and leg swelling. Gastrointestinal: Negative for abdominal distention, abdominal pain, diarrhea, nausea and vomiting. Endocrine: Negative for cold intolerance and heat intolerance. Genitourinary: Negative for dyspareunia, dysuria, frequency, menstrual problem, vaginal bleeding, vaginal discharge and vaginal pain. Musculoskeletal: Negative for arthralgias and back pain. Skin: Negative for rash. Neurological: Negative for light-headedness and headaches. Hematological: Does not bruise/bleed easily. Psychiatric/Behavioral: Negative for self-injury, sleep disturbance and suicidal ideas. The patient is not nervous/anxious. :     Vitals:  BP (!) 158/80   Pulse 98   Temp 96.9 °F (36.1 °C)   Ht 5' 5.5\" (1.664 m)   Wt 170 lb (77.1 kg)   SpO2 99%   BMI 27.86 kg/m²     Physical Exam  Physical Exam  Vitals reviewed. Chaperone present: chaperone declined. Constitutional:       Appearance: Normal appearance. HENT:      Head: Normocephalic and atraumatic. Eyes:      Conjunctiva/sclera: Conjunctivae normal.   Neck:      Thyroid: No thyroid mass, thyromegaly or thyroid tenderness. Cardiovascular:      Rate and Rhythm: Normal rate and regular rhythm. Pulses: Normal pulses. Pulmonary:      Breath sounds: Normal breath sounds. No wheezing, rhonchi or rales. Chest:   Breasts:      Right: Normal. No mass, nipple discharge, skin change, tenderness, axillary adenopathy or supraclavicular adenopathy. Left: Normal. No mass, nipple discharge, skin change, tenderness, axillary adenopathy or supraclavicular adenopathy. Abdominal:      General: Bowel sounds are normal. There is no distension. Palpations: Abdomen is soft. Tenderness: There is no abdominal tenderness. There is no rebound. Genitourinary:     General: Normal vulva. Exam position: Lithotomy position. Vagina: Normal. No vaginal discharge. Cervix: Friability present. No cervical motion tenderness, discharge or lesion. Uterus: Normal.       Adnexa: Right adnexa normal and left adnexa normal.      Comments: Mildly atrophic vaginal mucosa  Musculoskeletal:         General: No tenderness. Cervical back: Normal range of motion and neck supple. Lymphadenopathy:      Cervical: No cervical adenopathy. Upper Body:      Right upper body: No supraclavicular or axillary adenopathy. Left upper body: No supraclavicular or axillary adenopathy. Skin:     General: Skin is warm and dry. Findings: No rash. Neurological:      General: No focal deficit present. Mental Status: She is alert. Sensory: No sensory deficit. Psychiatric:         Mood and Affect: Mood normal.         Behavior: Behavior normal.       Results for POC orders placed in visit on 01/25/22   POCT Urinalysis No Micro (Auto)   Result Value Ref Range    Color, UA YELLOW     Clarity, UA CLOUDY     Glucose, UA POC NEGATIVE     Bilirubin, UA NEGATIVE     Ketones, UA NEGATIVE     Spec Grav, UA >=1.030     Blood, UA POC MODERATE     pH, UA 5.5     Protein, UA POC >=300     Urobilinogen, UA 0.2     Leukocytes, UA SMALL     Nitrite, UA NEGATIVE        Assessment:      Diagnosis Orders   1. Well woman exam with routine gynecological exam  Pap Smear   2. Other depression  sertraline (ZOLOFT) 50 MG tablet   3. Acute cystitis with hematuria  POCT Urinalysis No Micro (Auto)    sulfamethoxazole-trimethoprim (BACTRIM DS) 800-160 MG per tablet    Culture, Urine   4. Early menopause  DEXA BONE DENSITY AXIAL SKELETON       Body mass index is 27.86 kg/m².     Plan:   PAP SMEAR : indicated:  performed. Breast exam : Normal   Patient with early menopause has not had a DEXA scan done. This is been ordered. Will call with results when returned. Call with results of Pap smear when returned. Patient with acute cystitis. Will send urine for culture. Will treat with 7-day course of Bactrim. She is instructed if her symptoms do not resolve that she should return to care sooner. Patient voiced understanding and all questions answered. Follow-up scheduled    Orders Placed This Encounter   Procedures    Culture, Urine     Standing Status:   Future     Standing Expiration Date:   2023     Order Specific Question:   Specify (ex-cath, midstream, cysto, etc)? Answer:   midstream    DEXA BONE DENSITY AXIAL SKELETON     Standing Status:   Future     Standing Expiration Date:   2023     Order Specific Question:   Reason for exam:     Answer:   early menopause    Pap Smear     Patient History:    No LMP recorded. Patient is postmenopausal.  OBGYN Status: Postmenopausal  Past Surgical History:  2017: CARPAL TUNNEL RELEASE      Comment:  DR SARAVIA,  RT  No date: CHOLECYSTECTOMY  No date: TUBAL LIGATION      Social History    Tobacco Use      Smoking status: Former Smoker        Packs/day: 3.00        Years: 45.00        Pack years: 135        Types: Cigarettes        Start date:         Quit date: 7/3/2010        Years since quittin.5      Smokeless tobacco: Never Used       Standing Status:   Future     Standing Expiration Date:   2023     Order Specific Question:   Collection Type     Answer: Thin Prep     Order Specific Question:   Prior Abnormal Pap Test     Answer:   No     Order Specific Question:   Screening or Diagnostic     Answer:   Screening     Order Specific Question:   HPV Requested?      Answer:   Yes     Order Specific Question:   High Risk Patient     Answer:   N/A    POCT Urinalysis No Micro (Auto)       Orders Placed This Encounter Medications    sertraline (ZOLOFT) 50 MG tablet     Sig: Take 1 tablet by mouth daily     Dispense:  90 tablet     Refill:  1    sulfamethoxazole-trimethoprim (BACTRIM DS) 800-160 MG per tablet     Sig: Take 1 tablet by mouth 2 times daily for 7 days     Dispense:  14 tablet     Refill:  0       Follow up:  Return in about 6 months (around 7/25/2022) for follow up. HEALTH MAINTENANCE:   Health information given. Women's Health patient information and counselling done. regarding risk/benefits/alternatives to Hormone Therapy and need for yearly re-evaluation. Periodic Pap smear discussed. Mammogram recommended yearly. Colon cancer screening by age 48 & every 5-10years. Bone mineral density (by age 72 or sooner if indication it would affect Hormone Therapy management or other risk factors for osteoporosis).       Noah Shaw, DO

## 2022-01-28 LAB
ORGANISM: ABNORMAL
URINE CULTURE, ROUTINE: ABNORMAL

## 2022-02-01 LAB
HPV COMMENT: NORMAL
HPV TYPE 16: NOT DETECTED
HPV TYPE 18: NOT DETECTED
HPVOH (OTHER TYPES): NOT DETECTED

## 2022-03-01 DIAGNOSIS — E03.9 HYPOTHYROIDISM, UNSPECIFIED TYPE: ICD-10-CM

## 2022-03-01 NOTE — TELEPHONE ENCOUNTER
Patient requesting medication refill.  Please approve or deny this request.    Rx requested:  Requested Prescriptions     Pending Prescriptions Disp Refills    rosuvastatin (CRESTOR) 20 MG tablet 90 tablet 0     Sig: Take 1 tablet by mouth daily    levothyroxine (SYNTHROID) 50 MCG tablet 90 tablet 2     Sig: Take 1 tablet by mouth daily         Last Office Visit:   1/25/2022      Next Visit Date:  Future Appointments   Date Time Provider Sekou Tamez   7/25/2022  1:15 PM Alvin Garcia DO [address] [place] [address] 7

## 2022-03-02 RX ORDER — LEVOTHYROXINE SODIUM 0.05 MG/1
50 TABLET ORAL DAILY
Qty: 90 TABLET | Refills: 1 | Status: SHIPPED | OUTPATIENT
Start: 2022-03-02 | End: 2022-07-25 | Stop reason: SDUPTHER

## 2022-03-02 RX ORDER — ROSUVASTATIN CALCIUM 20 MG/1
20 TABLET, COATED ORAL DAILY
Qty: 90 TABLET | Refills: 1 | Status: SHIPPED | OUTPATIENT
Start: 2022-03-02 | End: 2022-07-25 | Stop reason: SDUPTHER

## 2022-03-03 DIAGNOSIS — E78.2 MIXED HYPERLIPIDEMIA: ICD-10-CM

## 2022-03-03 RX ORDER — FENOFIBRATE 145 MG/1
145 TABLET, COATED ORAL DAILY
Qty: 90 TABLET | Refills: 0 | Status: SHIPPED | OUTPATIENT
Start: 2022-03-03 | End: 2022-06-08

## 2022-03-03 NOTE — TELEPHONE ENCOUNTER
Rx request   Requested Prescriptions     Pending Prescriptions Disp Refills    fenofibrate (TRICOR) 145 MG tablet 90 tablet 0     Sig: Take 1 tablet by mouth daily     LOV 1/25/2022  Next Visit Date:  Future Appointments   Date Time Provider Sekou Tamez   7/25/2022  1:15 PM Romario Hung DO [address] [place] [address]

## 2022-05-11 ENCOUNTER — COMMUNITY OUTREACH (OUTPATIENT)
Dept: INTERNAL MEDICINE | Age: 62
End: 2022-05-11

## 2022-06-08 DIAGNOSIS — E78.2 MIXED HYPERLIPIDEMIA: ICD-10-CM

## 2022-06-08 RX ORDER — FENOFIBRATE 145 MG/1
TABLET, COATED ORAL
Qty: 90 TABLET | Refills: 1 | Status: SHIPPED | OUTPATIENT
Start: 2022-06-08

## 2022-06-09 DIAGNOSIS — E78.2 MIXED HYPERLIPIDEMIA: ICD-10-CM

## 2022-06-09 NOTE — TELEPHONE ENCOUNTER
Requested Prescriptions     Pending Prescriptions Disp Refills    fenofibrate (TRICOR) 145 MG tablet 90 tablet 1   PT is requesting 3 refills. Last appt. 1/25/2022  Next appt.  7/25/2022

## 2022-06-10 RX ORDER — FENOFIBRATE 145 MG/1
TABLET, COATED ORAL
Qty: 90 TABLET | Refills: 1 | OUTPATIENT
Start: 2022-06-10

## 2022-07-01 ENCOUNTER — TELEPHONE (OUTPATIENT)
Dept: FAMILY MEDICINE CLINIC | Age: 62
End: 2022-07-01

## 2022-07-25 ENCOUNTER — OFFICE VISIT (OUTPATIENT)
Dept: FAMILY MEDICINE CLINIC | Age: 62
End: 2022-07-25
Payer: COMMERCIAL

## 2022-07-25 VITALS
BODY MASS INDEX: 27.9 KG/M2 | TEMPERATURE: 97.5 F | WEIGHT: 173.6 LBS | OXYGEN SATURATION: 97 % | SYSTOLIC BLOOD PRESSURE: 130 MMHG | DIASTOLIC BLOOD PRESSURE: 70 MMHG | HEIGHT: 66 IN | HEART RATE: 96 BPM

## 2022-07-25 DIAGNOSIS — F32.89 OTHER DEPRESSION: ICD-10-CM

## 2022-07-25 DIAGNOSIS — E78.2 MIXED HYPERLIPIDEMIA: ICD-10-CM

## 2022-07-25 DIAGNOSIS — E03.9 HYPOTHYROIDISM, UNSPECIFIED TYPE: ICD-10-CM

## 2022-07-25 DIAGNOSIS — F51.01 PRIMARY INSOMNIA: ICD-10-CM

## 2022-07-25 DIAGNOSIS — I10 ESSENTIAL HYPERTENSION: Primary | ICD-10-CM

## 2022-07-25 PROCEDURE — 99214 OFFICE O/P EST MOD 30 MIN: CPT | Performed by: STUDENT IN AN ORGANIZED HEALTH CARE EDUCATION/TRAINING PROGRAM

## 2022-07-25 RX ORDER — TRAZODONE HYDROCHLORIDE 50 MG/1
50 TABLET ORAL NIGHTLY
Qty: 30 TABLET | Refills: 1 | Status: SHIPPED | OUTPATIENT
Start: 2022-07-25 | End: 2022-09-07 | Stop reason: SDUPTHER

## 2022-07-25 RX ORDER — LEVOTHYROXINE SODIUM 0.05 MG/1
50 TABLET ORAL DAILY
Qty: 90 TABLET | Refills: 1 | Status: SHIPPED | OUTPATIENT
Start: 2022-07-25

## 2022-07-25 RX ORDER — ROSUVASTATIN CALCIUM 20 MG/1
20 TABLET, COATED ORAL DAILY
Qty: 90 TABLET | Refills: 1 | Status: SHIPPED | OUTPATIENT
Start: 2022-07-25

## 2022-07-25 RX ORDER — GABAPENTIN 300 MG/1
300 CAPSULE ORAL NIGHTLY
COMMUNITY
Start: 2022-04-29

## 2022-07-25 SDOH — ECONOMIC STABILITY: TRANSPORTATION INSECURITY
IN THE PAST 12 MONTHS, HAS LACK OF TRANSPORTATION KEPT YOU FROM MEETINGS, WORK, OR FROM GETTING THINGS NEEDED FOR DAILY LIVING?: YES

## 2022-07-25 SDOH — ECONOMIC STABILITY: FOOD INSECURITY: WITHIN THE PAST 12 MONTHS, YOU WORRIED THAT YOUR FOOD WOULD RUN OUT BEFORE YOU GOT MONEY TO BUY MORE.: NEVER TRUE

## 2022-07-25 SDOH — ECONOMIC STABILITY: FOOD INSECURITY: WITHIN THE PAST 12 MONTHS, THE FOOD YOU BOUGHT JUST DIDN'T LAST AND YOU DIDN'T HAVE MONEY TO GET MORE.: NEVER TRUE

## 2022-07-25 SDOH — ECONOMIC STABILITY: TRANSPORTATION INSECURITY
IN THE PAST 12 MONTHS, HAS THE LACK OF TRANSPORTATION KEPT YOU FROM MEDICAL APPOINTMENTS OR FROM GETTING MEDICATIONS?: YES

## 2022-07-25 ASSESSMENT — ENCOUNTER SYMPTOMS
ABDOMINAL PAIN: 0
VOMITING: 0
SORE THROAT: 0
RHINORRHEA: 0
COUGH: 0
SHORTNESS OF BREATH: 0
WHEEZING: 0
DIARRHEA: 0
NAUSEA: 0

## 2022-07-25 ASSESSMENT — SOCIAL DETERMINANTS OF HEALTH (SDOH): HOW HARD IS IT FOR YOU TO PAY FOR THE VERY BASICS LIKE FOOD, HOUSING, MEDICAL CARE, AND HEATING?: NOT VERY HARD

## 2022-07-25 NOTE — PROGRESS NOTES
tolerate the side effects. She has never been on trazodone. She has no other concerns at this time. Review of Systems   Constitutional:  Negative for chills, fatigue, fever and unexpected weight change. HENT:  Negative for congestion, rhinorrhea and sore throat. Respiratory:  Negative for cough, shortness of breath and wheezing. Cardiovascular:  Negative for chest pain, palpitations and leg swelling. Gastrointestinal:  Negative for abdominal pain, diarrhea, nausea and vomiting. Musculoskeletal:  Negative for arthralgias and joint swelling. Skin:  Negative for rash. Neurological:  Negative for weakness, light-headedness, numbness and headaches. Psychiatric/Behavioral:  Positive for sleep disturbance. Negative for confusion and suicidal ideas. The patient is not nervous/anxious. Past Medical History:   Diagnosis Date    Allergic rhinitis     CRF (chronic renal failure) 7/2013    Crohn's disease ([facility] [address]     Depression     Histoplasmosis     bone marrow    Hyperlipidemia     Hypertension     Hypothyroidism 10/2020    Pancreatitis chronic     Renal failure, chronic     Type II or unspecified type diabetes mellitus without mention of complication, not stated as uncontrolled      Past Surgical History:   Procedure Laterality Date    CARPAL TUNNEL RELEASE  11/02/2017    DR SARAVIA,  RT    CHOLECYSTECTOMY      TUBAL LIGATION       Current Outpatient Medications   Medication Sig Dispense Refill    gabapentin (NEURONTIN) 300 MG capsule Take 300 mg by mouth at bedtime. sertraline (ZOLOFT) 50 MG tablet Take 1 tablet by mouth in the morning. 90 tablet 1    rosuvastatin (CRESTOR) 20 MG tablet Take 1 tablet by mouth in the morning. 90 tablet 1    levothyroxine (SYNTHROID) 50 MCG tablet Take 1 tablet by mouth in the morning.  90 tablet 1    traZODone (DESYREL) 50 MG tablet Take 1 tablet by mouth nightly 30 tablet 1    fenofibrate (TRICOR) 145 MG tablet take 1 tablet by mouth once daily 90 tablet 1 omeprazole (PRILOSEC) 40 MG delayed release capsule TAKE 1 CAPSULE DAILY 90 capsule 3    Insulin Pen Needle 32G X 6 MM MISC Use as directed with insulin, use 5 times daily 500 each 3    Multiple Vitamins-Minerals (CENTRUM SILVER PO) Take 1 tablet by mouth daily      magnesium cl-calcium carbonate (SLOW-MAG) 71.5-119 MG TBEC tablet Take 1 tablet by mouth 2 times daily      insulin lispro, 1 Unit Dial, (HUMALOG KWIKPEN) 100 UNIT/ML SOPN Inject 20 units before breakfast, 22 units before lunch, 26 before dinner 25 pen 3    insulin glargine (BASAGLAR KWIKPEN) 100 UNIT/ML injection pen Inject 35 Units into the skin 2 times daily 5 pen 3    sodium bicarbonate 650 MG tablet Take 1 tablet by mouth 2 times daily Take 2 tabs by mouth every 12 hours 180 tablet 3    Cholecalciferol (VITAMIN D3) 59990 UNITS CAPS Take 50,000 Units by mouth once a week 12 capsule 0    Insulin Pen Needle 31G X 6 MM MISC Use as directed with insulin 500 each 3    Diphenoxylate-Atropine (LOMOTIL PO) Take  by mouth. 2 before each meal total 6 per day       PREDNISONE Take 5 mg by mouth daily. Icosapent Ethyl (VASCEPA) 1 g CAPS capsule Take 2 g by mouth 2 times daily (Patient not taking: Reported on 7/25/2022)       No current facility-administered medications for this visit. PMH, Surgical Hx, Family Hx, and Social Hx reviewed and updated. Health Maintenance reviewed. Objective    Vitals:    07/25/22 1323   BP: 130/70   Pulse: 96   Temp: 97.5 °F (36.4 °C)   SpO2: 97%   Weight: 173 lb 9.6 oz (78.7 kg)   Height: 5' 5.5\" (1.664 m)     Physical Exam  Vitals reviewed. Constitutional:       General: She is not in acute distress. HENT:      Head: Normocephalic and atraumatic. Eyes:      Conjunctiva/sclera: Conjunctivae normal.   Neck:      Thyroid: No thyromegaly or thyroid tenderness. Cardiovascular:      Rate and Rhythm: Normal rate and regular rhythm. Heart sounds: No murmur heard. No friction rub. No gallop.    Pulmonary: Effort: Pulmonary effort is normal.      Breath sounds: Normal breath sounds. No wheezing, rhonchi or rales. Musculoskeletal:         General: No swelling or deformity. Cervical back: Normal range of motion and neck supple. Right lower leg: No edema. Left lower leg: No edema. Lymphadenopathy:      Cervical: No cervical adenopathy. Skin:     General: Skin is warm and dry. Findings: No rash. Neurological:      General: No focal deficit present. Mental Status: She is alert. Gait: Gait is intact. Psychiatric:         Mood and Affect: Mood normal.         Behavior: Behavior normal.       Assessment & Plan       1. Essential hypertension  Stable, chronic. Following with nephrology. Not currently on meds. Continue to monitor. Follow-up as scheduled. 2. Other depression  Stable, chronic. Continue sertraline 50 mg daily. Refill sent to the pharmacy. Continue to monitor. Follow-up as scheduled. - sertraline (ZOLOFT) 50 MG tablet; Take 1 tablet by mouth in the morning. Dispense: 90 tablet; Refill: 1    3. Hypothyroidism, unspecified type  Stable, chronic. Continue levothyroxine 50 mcg daily. Refill sent to the pharmacy. - levothyroxine (SYNTHROID) 50 MCG tablet; Take 1 tablet by mouth in the morning. Dispense: 90 tablet; Refill: 1    4. Primary insomnia  Patient not currently controlled with insomnia. She has tried multiple over-the-counter medications. We will plan to add trazodone to current treatment regimen. We discussed risks and benefits and she would like to proceed. Trazodone sent to the pharmacy. Follow-up in 4 to 6 weeks. Return to care sooner if side effects occur or not helping as much. - traZODone (DESYREL) 50 MG tablet; Take 1 tablet by mouth nightly  Dispense: 30 tablet; Refill: 1    5. Mixed hyperlipidemia  Stable, chronic. Continue rosuvastatin 20 mg daily. Refill sent to the pharmacy. - rosuvastatin (CRESTOR) 20 MG tablet;  Take 1 tablet by mouth in the morning. Dispense: 90 tablet; Refill: 1    No orders of the defined types were placed in this encounter. Orders Placed This Encounter   Medications    sertraline (ZOLOFT) 50 MG tablet     Sig: Take 1 tablet by mouth in the morning. Dispense:  90 tablet     Refill:  1    rosuvastatin (CRESTOR) 20 MG tablet     Sig: Take 1 tablet by mouth in the morning. Dispense:  90 tablet     Refill:  1    levothyroxine (SYNTHROID) 50 MCG tablet     Sig: Take 1 tablet by mouth in the morning. Dispense:  90 tablet     Refill:  1    traZODone (DESYREL) 50 MG tablet     Sig: Take 1 tablet by mouth nightly     Dispense:  30 tablet     Refill:  1       Medications Discontinued During This Encounter   Medication Reason    sertraline (ZOLOFT) 50 MG tablet REORDER    rosuvastatin (CRESTOR) 20 MG tablet REORDER    levothyroxine (SYNTHROID) 50 MCG tablet REORDER     Return in about 6 weeks (around 9/5/2022) for follow up. Reviewed with the patient: current clinical status,medications, activities and diet. Side effects, adverse effects of themedication prescribed today, as well as treatment plan/ rationale and result expectations have been discussed with the patient who expresses understanding and desires to proceed. Close follow up to evaluate treatment results and for coordination of care. I have reviewed the patient's medical history in detail and updated the computerized patient record. Please note, this report has been partially produced using speech recognition software and may cause  and /or contain errors related to that system including grammar, punctuation and spelling as well as words and phrases that may seem inappropriate. If there are questions or concerns please feel free to contact me to clarify.     Sourav Chadwick, DO

## 2022-07-25 NOTE — PATIENT INSTRUCTIONS
Family and Housing Resources    [employer] of [address] [place] [address]  Call 211  or - www. [email or web address] [address] [place] [address])  Call - [phone]  [email or web address]    [facility]  [address]  [place], [address] [place] [address] 82  [address] [facility] [address]  [phone] /[phone]    Medicaid Application  Https://benefits. [place].gov/  [phone]    Home Energy Assistance Programs (HEAP)  [address]. [facility], [address] [place] [address]  [phone]    [facility] ( [facility])  [address], [address] [facility] ([facility])  [facility], [address] [facility]  [address] [facility]  www. [email or web address]    [employer]  [address] [place] [address], [address]  [id number] [place] [address] for [employer]  [address] [place] [address] [address] [facility]. [facility] at [address] [place] [address])  Call - [phone]  [email or web address].[email or web address] of [address] [place] [address]  Call 211 or  [email or web address]      Prov[employer] A Ride  [phone]    [employer]  [address]  Non-Emergency:  [address]  [address] [facility]. [address]  [phone]  [facility]    [facility] on [address] [place] [address]  [phone]  Age 65+ limited tranportation area. 24 hour notice required. [address] [facility])  Call - [phone]  www.[email or web address] of [address] [place] [address]  Call 211 or  [employer]      Provide A Ride  [phone]    [employer] [facility]  [phone]    [facility]  Non-Emergency:  [address]  [address] [facility]. [address]  [phone]  [place] Ramon    [facility] on [address] [address]  [phone]  Age 65+ limited tranportation area. 24 hour notice required.

## 2022-08-02 ENCOUNTER — COMMUNITY OUTREACH (OUTPATIENT)
Dept: FAMILY MEDICINE CLINIC | Age: 62
End: 2022-08-02

## 2022-08-04 ENCOUNTER — COMMUNITY OUTREACH (OUTPATIENT)
Dept: FAMILY MEDICINE CLINIC | Age: 62
End: 2022-08-04

## 2022-08-04 NOTE — PROGRESS NOTES
Patient's HM shows they are overdue for Mammogram Screening. [employer] and  files searched. No results to attach to order nor HM updated.

## 2022-09-07 ENCOUNTER — OFFICE VISIT (OUTPATIENT)
Dept: FAMILY MEDICINE CLINIC | Age: 62
End: 2022-09-07
Payer: COMMERCIAL

## 2022-09-07 VITALS
WEIGHT: 164.8 LBS | HEIGHT: 66 IN | DIASTOLIC BLOOD PRESSURE: 74 MMHG | SYSTOLIC BLOOD PRESSURE: 125 MMHG | HEART RATE: 100 BPM | OXYGEN SATURATION: 100 % | TEMPERATURE: 97.6 F | BODY MASS INDEX: 26.48 KG/M2

## 2022-09-07 DIAGNOSIS — N30.01 ACUTE CYSTITIS WITH HEMATURIA: ICD-10-CM

## 2022-09-07 DIAGNOSIS — Z79.4 TYPE 2 DIABETES MELLITUS WITH STAGE 4 CHRONIC KIDNEY DISEASE, WITH LONG-TERM CURRENT USE OF INSULIN (HCC): ICD-10-CM

## 2022-09-07 DIAGNOSIS — N18.4 TYPE 2 DIABETES MELLITUS WITH STAGE 4 CHRONIC KIDNEY DISEASE, WITH LONG-TERM CURRENT USE OF INSULIN (HCC): ICD-10-CM

## 2022-09-07 DIAGNOSIS — R30.0 DYSURIA: ICD-10-CM

## 2022-09-07 DIAGNOSIS — E78.2 MIXED HYPERLIPIDEMIA: ICD-10-CM

## 2022-09-07 DIAGNOSIS — F32.89 OTHER DEPRESSION: ICD-10-CM

## 2022-09-07 DIAGNOSIS — E11.22 TYPE 2 DIABETES MELLITUS WITH STAGE 4 CHRONIC KIDNEY DISEASE, WITH LONG-TERM CURRENT USE OF INSULIN (HCC): ICD-10-CM

## 2022-09-07 DIAGNOSIS — F51.01 PRIMARY INSOMNIA: Primary | ICD-10-CM

## 2022-09-07 DIAGNOSIS — I10 ESSENTIAL HYPERTENSION: ICD-10-CM

## 2022-09-07 DIAGNOSIS — E03.9 HYPOTHYROIDISM, UNSPECIFIED TYPE: ICD-10-CM

## 2022-09-07 LAB
BILIRUBIN, POC: NEGATIVE
BLOOD URINE, POC: ABNORMAL
CLARITY, POC: ABNORMAL
COLOR, POC: YELLOW
GLUCOSE URINE, POC: NEGATIVE
KETONES, POC: NEGATIVE
LEUKOCYTE EST, POC: ABNORMAL
NITRITE, POC: NEGATIVE
PH, POC: 5.5
PROTEIN, POC: >=300
SPECIFIC GRAVITY, POC: >=1.03
UROBILINOGEN, POC: 0.2

## 2022-09-07 PROCEDURE — 99214 OFFICE O/P EST MOD 30 MIN: CPT | Performed by: STUDENT IN AN ORGANIZED HEALTH CARE EDUCATION/TRAINING PROGRAM

## 2022-09-07 PROCEDURE — 81003 URINALYSIS AUTO W/O SCOPE: CPT | Performed by: STUDENT IN AN ORGANIZED HEALTH CARE EDUCATION/TRAINING PROGRAM

## 2022-09-07 RX ORDER — TRAZODONE HYDROCHLORIDE 50 MG/1
50 TABLET ORAL NIGHTLY
Qty: 90 TABLET | Refills: 2 | Status: SHIPPED | OUTPATIENT
Start: 2022-09-07 | End: 2022-12-06

## 2022-09-07 RX ORDER — SULFAMETHOXAZOLE AND TRIMETHOPRIM 800; 160 MG/1; MG/1
1 TABLET ORAL 2 TIMES DAILY
Qty: 14 TABLET | Refills: 0 | Status: SHIPPED | OUTPATIENT
Start: 2022-09-07 | End: 2022-09-14

## 2022-09-07 ASSESSMENT — ENCOUNTER SYMPTOMS
SORE THROAT: 0
WHEEZING: 0
SHORTNESS OF BREATH: 0
RHINORRHEA: 0
COUGH: 0
ABDOMINAL PAIN: 0
EYE DISCHARGE: 0
VOMITING: 0
NAUSEA: 0
DIARRHEA: 0

## 2022-09-07 NOTE — PROGRESS NOTES
Subjective  Alma Hoang, 60 y.o. female presents today with:  Chief Complaint   Patient presents with    Follow-up    Hypertension     Monitors BP at home. It runs 120-130/80-85. Denies any chest pain, heart palpitations, dizziness or headaches. Diabetes     Last A1C was 7.5 on 12/14/21. Checks blood sugar 2-3 times a day. Sees endocrinology at [facility]. Depression     Feels her mood is good. Denies feeling down & depressed. Insomnia     Is sleeping better. Still having a little bit of a hard time falling asleep, but once she falls asleep she is able to stay asleep. Is getting about 8 hrs a night of sleep. Hyperlipidemia    Hypothyroidism    Dysuria     Has been having some burning with urination & feeling like she has to urinate all of the time for the past couple of days. Denies any incont. HPI    Patient with follow-up appointment for hypertension, depression, insomnia, hyperlipidemia and hypothyroidism. Patient with essentially pretension. She is normotensive in the office today. She does check blood pressure at home. It usually runs in the 120s to 130s over 80s. She denies any chest pain, shortness of breath, palpitations, dizziness or headaches. She is not currently on medications for this. Patient also with insomnia. She states she has been sleeping much better since starting the trazodone. She states that she is able to fall asleep with it okay. She states that her staying asleep has gotten immensely better. She is getting about 8 hours of sleep per night. She is not waking up groggy. She states that it has been a wonderful addition and that she is feeling so much better with it. Patient also type 2 diabetes mellitus. She does follow with endocrinology at [facility]. She is scheduled for follow-up next month. Patient also with hyperlipidemia and hypothyroidism. These have been stable and chronic.   She is on levothyroxine 50 mcg daily and rosuvastatin 20 mg daily. Tolerating well. Patient also with acute concern today for possible UTI. She has been having some burning with urination and frequency. This been going on the past couple days. No fevers or chills. No back pain. She denies any incontinence. She has not noticed any blood in her urine. She has no other concerns at this time. Review of Systems   Constitutional:  Negative for chills, fatigue, fever and unexpected weight change. HENT:  Negative for congestion, rhinorrhea and sore throat. Eyes:  Negative for discharge. Respiratory:  Negative for cough, shortness of breath and wheezing. Cardiovascular:  Negative for chest pain, palpitations and leg swelling. Gastrointestinal:  Negative for abdominal pain, diarrhea, nausea and vomiting. Genitourinary:  Positive for dysuria and frequency. Negative for difficulty urinating. Musculoskeletal:  Negative for arthralgias and joint swelling. Skin:  Negative for rash. Neurological:  Negative for weakness, light-headedness, numbness and headaches. Psychiatric/Behavioral:  Negative for confusion, sleep disturbance and suicidal ideas. The patient is not nervous/anxious.       Past Medical History:   Diagnosis Date    Allergic rhinitis     CRF (chronic renal failure) 7/2013    Crohn's disease ([facility] [address]     Depression     Histoplasmosis     bone marrow    Hyperlipidemia     Hypertension     Hypothyroidism 10/2020    Pancreatitis chronic     Renal failure, chronic     Type II or unspecified type diabetes mellitus without mention of complication, not stated as uncontrolled      Past Surgical History:   Procedure Laterality Date    CARPAL TUNNEL RELEASE  11/02/2017    DR SARAVIA,  RT    CHOLECYSTECTOMY      TUBAL LIGATION       Current Outpatient Medications   Medication Sig Dispense Refill    traZODone (DESYREL) 50 MG tablet Take 1 tablet by mouth nightly 90 tablet 2    sulfamethoxazole-trimethoprim (BACTRIM DS;SEPTRA DS) 800-160 MG per tablet Take 1 tablet by mouth 2 times daily for 7 days 14 tablet 0    gabapentin (NEURONTIN) 300 MG capsule Take 300 mg by mouth at bedtime. sertraline (ZOLOFT) 50 MG tablet Take 1 tablet by mouth in the morning. 90 tablet 1    rosuvastatin (CRESTOR) 20 MG tablet Take 1 tablet by mouth in the morning. 90 tablet 1    levothyroxine (SYNTHROID) 50 MCG tablet Take 1 tablet by mouth in the morning. 90 tablet 1    fenofibrate (TRICOR) 145 MG tablet take 1 tablet by mouth once daily 90 tablet 1    omeprazole (PRILOSEC) 40 MG delayed release capsule TAKE 1 CAPSULE DAILY 90 capsule 3    Insulin Pen Needle 32G X 6 MM MISC Use as directed with insulin, use 5 times daily 500 each 3    Icosapent Ethyl (VASCEPA) 1 g CAPS capsule Take 2 g by mouth 2 times daily      Multiple Vitamins-Minerals (CENTRUM SILVER PO) Take 1 tablet by mouth daily      magnesium cl-calcium carbonate (SLOW-MAG) 71.5-119 MG TBEC tablet Take 1 tablet by mouth 2 times daily      insulin lispro, 1 Unit Dial, (HUMALOG KWIKPEN) 100 UNIT/ML SOPN Inject 20 units before breakfast, 22 units before lunch, 26 before dinner 25 pen 3    insulin glargine (BASAGLAR KWIKPEN) 100 UNIT/ML injection pen Inject 35 Units into the skin 2 times daily 5 pen 3    sodium bicarbonate 650 MG tablet Take 1 tablet by mouth 2 times daily Take 2 tabs by mouth every 12 hours 180 tablet 3    Cholecalciferol (VITAMIN D3) 20301 UNITS CAPS Take 50,000 Units by mouth once a week 12 capsule 0    Insulin Pen Needle 31G X 6 MM MISC Use as directed with insulin 500 each 3    Diphenoxylate-Atropine (LOMOTIL PO) Take  by mouth. 2 before each meal total 6 per day       PREDNISONE Take 5 mg by mouth daily. No current facility-administered medications for this visit. PMH, Surgical Hx, Family Hx, and Social Hx reviewed and updated. Health Maintenance reviewed.     Objective    Vitals:    09/07/22 1139   BP: 125/74   Pulse: 100   Temp: 97.6 °F (36.4 °C)   SpO2: 100%   Weight: 164 lb 12.8 oz (74.8 kg) Height: 5' 5.5\" (1.664 m)       Physical Exam       Assessment & Plan       1. Primary insomnia  Significantly improved. Continue trazodone 50 mg nightly. New prescription sent to the pharmacy. Continue to monitor. Continue good sleep hygiene. Follow-up in 6 months. - traZODone (DESYREL) 50 MG tablet; Take 1 tablet by mouth nightly  Dispense: 90 tablet; Refill: 2    2. Essential hypertension  Stable, chronic. Normotensive in the office today. Not currently on meds. Follow-up as scheduled. 3. Hypothyroidism, unspecified type  Stable, chronic. Continue levothyroxine 50 mcg daily. No refills needed. Not due for TSH. Continue to monitor. 4. Acute cystitis with hematuria  Patient with UTI today. Her last urine culture grew E. coli. We will start Bactrim 1 pill twice a day for 7 days. If symptoms not improving, return to care sooner. - sulfamethoxazole-trimethoprim (BACTRIM DS;SEPTRA DS) 800-160 MG per tablet; Take 1 tablet by mouth 2 times daily for 7 days  Dispense: 14 tablet; Refill: 0    5. Dysuria  As above  - POCT Urinalysis No Micro (Auto)    6. Type 2 diabetes mellitus with stage 4 chronic kidney disease, with long-term current use of insulin (HCC)  Stable, chronic. Continue to follow with endocrinology    7. Other depression  Stable, chronic. Continue sertraline 50 mg daily. 8. Mixed hyperlipidemia  Stable, chronic.   Continue rosuvastatin 20 mg daily  Orders Placed This Encounter   Procedures    POCT Urinalysis No Micro (Auto)     Orders Placed This Encounter   Medications    traZODone (DESYREL) 50 MG tablet     Sig: Take 1 tablet by mouth nightly     Dispense:  90 tablet     Refill:  2    sulfamethoxazole-trimethoprim (BACTRIM DS;SEPTRA DS) 800-160 MG per tablet     Sig: Take 1 tablet by mouth 2 times daily for 7 days     Dispense:  14 tablet     Refill:  0     Medications Discontinued During This Encounter   Medication Reason    traZODone (DESYREL) 50 MG tablet REORDER     Return

## 2022-09-22 ENCOUNTER — NURSE TRIAGE (OUTPATIENT)
Dept: OTHER | Facility: CLINIC | Age: 62
End: 2022-09-22

## 2022-09-22 NOTE — TELEPHONE ENCOUNTER
Received call from [facility] with Red Flag Complaint. Subjective: Caller states \"extreme fatigue and weakness\"     Current Symptoms: has been weak and tired for the past 2 weeks but now it's the worst that it has been. Could barely go up her steps to second level of home yesterday. Can get up to go to bathroom. Feels like she gets winded more lately too with exertion, no chest pain. No asthma, COPD, former smoker. Has DM, HTN and hypothyroidism and CKD. Blood sugar and pressure has been fine. No changes in medications or started new ones. Onset: 2 weeks ago; gradual    Associated Symptoms: NA    Pain Severity: 0/10; N/A; none    Temperature: no fever     What has been tried: laying in bed    LMP: NA Pregnant: NA    Recommended disposition: Go to Office Now    Care advice provided, patient verbalizes understanding; denies any other questions or concerns; instructed to call back for any new or worsening symptoms. Patient/Caller agrees with recommended disposition; writer provided warm transfer to [facility] for appointment scheduling     Attention Provider: Thank you for allowing me to participate in the care of your patient. The patient was connected to triage in response to information provided to the ECC/PSC. Please do not respond through this encounter as the response is not directed to a shared pool.     Reason for Disposition   MODERATE weakness (i.e., interferes with work, school, normal activities) and cause unknown  (Exceptions: Weakness with acute minor illness, or weakness from poor fluid intake.)    Protocols used: Weakness (Generalized) and Fatigue-ADULT-OH

## 2022-11-28 DIAGNOSIS — E78.2 MIXED HYPERLIPIDEMIA: ICD-10-CM

## 2022-11-28 RX ORDER — FENOFIBRATE 145 MG/1
TABLET, COATED ORAL
Qty: 90 TABLET | Refills: 1 | Status: SHIPPED | OUTPATIENT
Start: 2022-11-28

## 2022-11-28 NOTE — TELEPHONE ENCOUNTER
Rx requested:  Requested Prescriptions     Pending Prescriptions Disp Refills    fenofibrate (TRICOR) 145 MG tablet [Pharmacy Med Name: FENOFIBRATE 145 MG TABLET] 90 tablet 1     Sig: take 1 tablet by mouth once daily         Last Office Visit:   9/7/2022      Next Visit Date:  Future Appointments   Date Time Provider Sekou Tamez   4/7/2023  1:00 PM Devon Rollins DO [address] [place] [address]

## 2023-03-16 DIAGNOSIS — E03.9 HYPOTHYROIDISM, UNSPECIFIED TYPE: ICD-10-CM

## 2023-03-17 RX ORDER — LEVOTHYROXINE SODIUM 0.05 MG/1
TABLET ORAL
Qty: 90 TABLET | Refills: 1 | Status: SHIPPED | OUTPATIENT
Start: 2023-03-17

## 2023-03-17 RX ORDER — OMEPRAZOLE 40 MG/1
CAPSULE, DELAYED RELEASE ORAL
Qty: 90 CAPSULE | Refills: 1 | Status: SHIPPED | OUTPATIENT
Start: 2023-03-17

## 2023-03-28 DIAGNOSIS — E78.2 MIXED HYPERLIPIDEMIA: ICD-10-CM

## 2023-03-28 LAB — SARS-COV-2 RESULT: NOT DETECTED

## 2023-03-28 RX ORDER — ROSUVASTATIN CALCIUM 20 MG/1
TABLET, COATED ORAL
Qty: 90 TABLET | Refills: 1 | Status: SHIPPED | OUTPATIENT
Start: 2023-03-28

## 2023-03-28 NOTE — TELEPHONE ENCOUNTER
Comments: This request is coming from the pharmacy. Last Office Visit (last PCP visit):   9/7/2022    Next Visit Date:  Future Appointments   Date Time Provider Sekou Tamez   4/7/2023  1:00 PM [facility]       If hasn't been seen in over a year OR hasn't followed up according to last diabetes/ADHD visit, make appointment for patient before sending refill to provider.     Rx requested:  Requested Prescriptions     Pending Prescriptions Disp Refills    rosuvastatin (CRESTOR) 20 MG tablet [Pharmacy Med Name: ROSUVASTATIN CALCIUM 20 MG TAB] 90 tablet 1     Sig: take 1 tablet by mouth every morning

## 2023-03-30 ENCOUNTER — HOSPITAL ENCOUNTER (OUTPATIENT)
Dept: DATA CONVERSION | Facility: HOSPITAL | Age: 63
End: 2023-03-30
Attending: OBSTETRICS & GYNECOLOGY | Admitting: OBSTETRICS & GYNECOLOGY
Payer: COMMERCIAL

## 2023-03-30 DIAGNOSIS — R93.89 ABNORMAL FINDINGS ON DIAGNOSTIC IMAGING OF OTHER SPECIFIED BODY STRUCTURES: ICD-10-CM

## 2023-03-30 DIAGNOSIS — I13.10 HYPERTENSIVE HEART AND CHRONIC KIDNEY DISEASE WITHOUT HEART FAILURE, WITH STAGE 1 THROUGH STAGE 4 CHRONIC KIDNEY DISEASE, OR UNSPECIFIED CHRONIC KIDNEY DISEASE: ICD-10-CM

## 2023-03-30 DIAGNOSIS — Z90.49 ACQUIRED ABSENCE OF OTHER SPECIFIED PARTS OF DIGESTIVE TRACT: ICD-10-CM

## 2023-03-30 DIAGNOSIS — K21.9 GASTRO-ESOPHAGEAL REFLUX DISEASE WITHOUT ESOPHAGITIS: ICD-10-CM

## 2023-03-30 DIAGNOSIS — E78.5 HYPERLIPIDEMIA, UNSPECIFIED: ICD-10-CM

## 2023-03-30 DIAGNOSIS — D50.9 IRON DEFICIENCY ANEMIA, UNSPECIFIED: ICD-10-CM

## 2023-03-30 DIAGNOSIS — R94.5 ABNORMAL RESULTS OF LIVER FUNCTION STUDIES: ICD-10-CM

## 2023-03-30 DIAGNOSIS — K86.1 OTHER CHRONIC PANCREATITIS (MULTI): ICD-10-CM

## 2023-03-30 DIAGNOSIS — E11.22 TYPE 2 DIABETES MELLITUS WITH DIABETIC CHRONIC KIDNEY DISEASE (MULTI): ICD-10-CM

## 2023-03-30 DIAGNOSIS — Z91.041 RADIOGRAPHIC DYE ALLERGY STATUS: ICD-10-CM

## 2023-03-30 DIAGNOSIS — D84.9 IMMUNODEFICIENCY, UNSPECIFIED (MULTI): ICD-10-CM

## 2023-03-30 DIAGNOSIS — D63.8 ANEMIA IN OTHER CHRONIC DISEASES CLASSIFIED ELSEWHERE: ICD-10-CM

## 2023-03-30 DIAGNOSIS — Z87.891 PERSONAL HISTORY OF NICOTINE DEPENDENCE: ICD-10-CM

## 2023-03-30 DIAGNOSIS — N85.00 ENDOMETRIAL HYPERPLASIA, UNSPECIFIED: ICD-10-CM

## 2023-03-30 DIAGNOSIS — N18.4 CHRONIC KIDNEY DISEASE, STAGE 4 (SEVERE) (MULTI): ICD-10-CM

## 2023-03-30 DIAGNOSIS — Z79.4 LONG TERM (CURRENT) USE OF INSULIN (MULTI): ICD-10-CM

## 2023-03-30 LAB
POCT GLUCOSE: 45 MG/DL (ref 74–99)
POCT GLUCOSE: 56 MG/DL (ref 74–99)
POCT GLUCOSE: 73 MG/DL (ref 74–99)

## 2023-04-05 LAB
COMPLETE PATHOLOGY REPORT: NORMAL
CONVERTED CLINICAL DIAGNOSIS-HISTORY: NORMAL
CONVERTED FINAL DIAGNOSIS: NORMAL
CONVERTED FINAL REPORT PDF LINK TO COPY AND PASTE: NORMAL
CONVERTED GROSS DESCRIPTION: NORMAL

## 2023-04-06 LAB
COMPLETE PATHOLOGY REPORT: NORMAL
CONVERTED CLINICAL DIAGNOSIS-HISTORY: NORMAL
CONVERTED DIAGNOSIS COMMENT: NORMAL
CONVERTED FINAL DIAGNOSIS: NORMAL
CONVERTED FINAL REPORT PDF LINK TO COPY AND PASTE: NORMAL

## 2023-05-02 ENCOUNTER — OFFICE VISIT (OUTPATIENT)
Dept: FAMILY MEDICINE CLINIC | Age: 63
End: 2023-05-02
Payer: COMMERCIAL

## 2023-05-02 VITALS — HEIGHT: 66 IN | BODY MASS INDEX: 24.33 KG/M2 | WEIGHT: 151.4 LBS

## 2023-05-02 DIAGNOSIS — E03.9 HYPOTHYROIDISM, UNSPECIFIED TYPE: ICD-10-CM

## 2023-05-02 DIAGNOSIS — F32.89 OTHER DEPRESSION: ICD-10-CM

## 2023-05-02 DIAGNOSIS — N18.4 TYPE 2 DIABETES MELLITUS WITH STAGE 4 CHRONIC KIDNEY DISEASE, WITH LONG-TERM CURRENT USE OF INSULIN (HCC): ICD-10-CM

## 2023-05-02 DIAGNOSIS — E78.2 MIXED HYPERLIPIDEMIA: ICD-10-CM

## 2023-05-02 DIAGNOSIS — I10 ESSENTIAL HYPERTENSION: Primary | ICD-10-CM

## 2023-05-02 DIAGNOSIS — E11.22 TYPE 2 DIABETES MELLITUS WITH STAGE 4 CHRONIC KIDNEY DISEASE, WITH LONG-TERM CURRENT USE OF INSULIN (HCC): ICD-10-CM

## 2023-05-02 DIAGNOSIS — Z79.4 TYPE 2 DIABETES MELLITUS WITH STAGE 4 CHRONIC KIDNEY DISEASE, WITH LONG-TERM CURRENT USE OF INSULIN (HCC): ICD-10-CM

## 2023-05-02 DIAGNOSIS — F51.01 PRIMARY INSOMNIA: ICD-10-CM

## 2023-05-02 PROCEDURE — 99214 OFFICE O/P EST MOD 30 MIN: CPT | Performed by: STUDENT IN AN ORGANIZED HEALTH CARE EDUCATION/TRAINING PROGRAM

## 2023-05-02 RX ORDER — OMEPRAZOLE 40 MG/1
CAPSULE, DELAYED RELEASE ORAL
Qty: 90 CAPSULE | Refills: 1 | Status: SHIPPED | OUTPATIENT
Start: 2023-05-02

## 2023-05-02 RX ORDER — AMLODIPINE BESYLATE 2.5 MG/1
1 TABLET ORAL DAILY
COMMUNITY
Start: 2023-04-06

## 2023-05-02 RX ORDER — SERTRALINE HYDROCHLORIDE 100 MG/1
100 TABLET, FILM COATED ORAL DAILY
Qty: 90 TABLET | Refills: 1 | Status: SHIPPED | OUTPATIENT
Start: 2023-05-02 | End: 2023-07-31

## 2023-05-02 RX ORDER — LEVOTHYROXINE SODIUM 0.05 MG/1
50 TABLET ORAL EVERY MORNING
Qty: 90 TABLET | Refills: 1 | Status: SHIPPED | OUTPATIENT
Start: 2023-05-02

## 2023-05-02 RX ORDER — TRAZODONE HYDROCHLORIDE 100 MG/1
100 TABLET ORAL NIGHTLY PRN
Qty: 90 TABLET | Refills: 1 | Status: SHIPPED | OUTPATIENT
Start: 2023-05-02 | End: 2023-07-31

## 2023-05-02 RX ORDER — FENOFIBRATE 145 MG/1
145 TABLET, COATED ORAL DAILY
Qty: 90 TABLET | Refills: 1 | Status: SHIPPED | OUTPATIENT
Start: 2023-05-02

## 2023-05-02 RX ORDER — ROSUVASTATIN CALCIUM 20 MG/1
20 TABLET, COATED ORAL EVERY MORNING
Qty: 90 TABLET | Refills: 1 | Status: SHIPPED | OUTPATIENT
Start: 2023-05-02

## 2023-05-02 SDOH — ECONOMIC STABILITY: FOOD INSECURITY: WITHIN THE PAST 12 MONTHS, YOU WORRIED THAT YOUR FOOD WOULD RUN OUT BEFORE YOU GOT MONEY TO BUY MORE.: NEVER TRUE

## 2023-05-02 SDOH — ECONOMIC STABILITY: INCOME INSECURITY: HOW HARD IS IT FOR YOU TO PAY FOR THE VERY BASICS LIKE FOOD, HOUSING, MEDICAL CARE, AND HEATING?: NOT HARD AT ALL

## 2023-05-02 SDOH — ECONOMIC STABILITY: FOOD INSECURITY: WITHIN THE PAST 12 MONTHS, THE FOOD YOU BOUGHT JUST DIDN'T LAST AND YOU DIDN'T HAVE MONEY TO GET MORE.: NEVER TRUE

## 2023-05-02 SDOH — ECONOMIC STABILITY: HOUSING INSECURITY
IN THE LAST 12 MONTHS, WAS THERE A TIME WHEN YOU DID NOT HAVE A STEADY PLACE TO SLEEP OR SLEPT IN A SHELTER (INCLUDING NOW)?: NO

## 2023-05-02 ASSESSMENT — PATIENT HEALTH QUESTIONNAIRE - PHQ9
5. POOR APPETITE OR OVEREATING: 1
8. MOVING OR SPEAKING SO SLOWLY THAT OTHER PEOPLE COULD HAVE NOTICED. OR THE OPPOSITE, BEING SO FIGETY OR RESTLESS THAT YOU HAVE BEEN MOVING AROUND A LOT MORE THAN USUAL: 0
2. FEELING DOWN, DEPRESSED OR HOPELESS: 3
10. IF YOU CHECKED OFF ANY PROBLEMS, HOW DIFFICULT HAVE THESE PROBLEMS MADE IT FOR YOU TO DO YOUR WORK, TAKE CARE OF THINGS AT HOME, OR GET ALONG WITH OTHER PEOPLE: 1
4. FEELING TIRED OR HAVING LITTLE ENERGY: 3
9. THOUGHTS THAT YOU WOULD BE BETTER OFF DEAD, OR OF HURTING YOURSELF: 0
7. TROUBLE CONCENTRATING ON THINGS, SUCH AS READING THE NEWSPAPER OR WATCHING TELEVISION: 1
3. TROUBLE FALLING OR STAYING ASLEEP: 3
1. LITTLE INTEREST OR PLEASURE IN DOING THINGS: 3
SUM OF ALL RESPONSES TO PHQ QUESTIONS 1-9: 14
SUM OF ALL RESPONSES TO PHQ9 QUESTIONS 1 & 2: 6
SUM OF ALL RESPONSES TO PHQ QUESTIONS 1-9: 14
6. FEELING BAD ABOUT YOURSELF - OR THAT YOU ARE A FAILURE OR HAVE LET YOURSELF OR YOUR FAMILY DOWN: 0

## 2023-05-02 ASSESSMENT — ENCOUNTER SYMPTOMS
VOMITING: 0
SHORTNESS OF BREATH: 0
SORE THROAT: 0
DIARRHEA: 0
COUGH: 0
ABDOMINAL PAIN: 0
WHEEZING: 0
RHINORRHEA: 0
EYE DISCHARGE: 0
NAUSEA: 0

## 2023-05-02 NOTE — PROGRESS NOTES
fenofibrate (TRICOR) 145 MG tablet; Take 1 tablet by mouth daily  Dispense: 90 tablet; Refill: 1  - rosuvastatin (CRESTOR) 20 MG tablet; Take 1 tablet by mouth every morning  Dispense: 90 tablet; Refill: 1    Orders Placed This Encounter   Procedures    Microalbumin / Creatinine Urine Ratio     Standing Status:   Future     Standing Expiration Date:   5/2/2024     Orders Placed This Encounter   Medications    sertraline (ZOLOFT) 100 MG tablet     Sig: Take 1 tablet by mouth daily     Dispense:  90 tablet     Refill:  1    traZODone (DESYREL) 100 MG tablet     Sig: Take 1 tablet by mouth nightly as needed for Sleep     Dispense:  90 tablet     Refill:  1    fenofibrate (TRICOR) 145 MG tablet     Sig: Take 1 tablet by mouth daily     Dispense:  90 tablet     Refill:  1    levothyroxine (SYNTHROID) 50 MCG tablet     Sig: Take 1 tablet by mouth every morning     Dispense:  90 tablet     Refill:  1    omeprazole (PRILOSEC) 40 MG delayed release capsule     Sig: take 1 capsule by mouth once daily     Dispense:  90 capsule     Refill:  1    rosuvastatin (CRESTOR) 20 MG tablet     Sig: Take 1 tablet by mouth every morning     Dispense:  90 tablet     Refill:  1     Medications Discontinued During This Encounter   Medication Reason    sertraline (ZOLOFT) 50 MG tablet DOSE ADJUSTMENT    traZODone (DESYREL) 50 MG tablet DOSE ADJUSTMENT    fenofibrate (TRICOR) 145 MG tablet REORDER    levothyroxine (SYNTHROID) 50 MCG tablet REORDER    omeprazole (PRILOSEC) 40 MG delayed release capsule REORDER    rosuvastatin (CRESTOR) 20 MG tablet REORDER     Return in about 6 months (around 11/2/2023) for follow up. Reviewed with the patient: current clinical status,medications, activities and diet. Side effects, adverse effects of the medication prescribed today, as well as treatment plan/ rationale and result expectations have been discussed with the patient who expresses understanding and desires to proceed.     Close follow up to

## 2023-07-28 ENCOUNTER — OFFICE VISIT (OUTPATIENT)
Dept: PRIMARY CARE CLINIC | Age: 63
End: 2023-07-28

## 2023-07-28 VITALS
HEART RATE: 94 BPM | SYSTOLIC BLOOD PRESSURE: 100 MMHG | DIASTOLIC BLOOD PRESSURE: 60 MMHG | HEIGHT: 65 IN | OXYGEN SATURATION: 98 % | WEIGHT: 134.8 LBS | TEMPERATURE: 97 F | BODY MASS INDEX: 22.46 KG/M2

## 2023-07-28 DIAGNOSIS — Z09 HOSPITAL DISCHARGE FOLLOW-UP: Primary | ICD-10-CM
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
## 2023-07-28 DIAGNOSIS — N39.0 URINARY TRACT INFECTION WITHOUT HEMATURIA, SITE UNSPECIFIED: ICD-10-CM

## 2023-07-28 DIAGNOSIS — S22.31XD CLOSED FRACTURE OF ONE RIB OF RIGHT SIDE WITH ROUTINE HEALING, SUBSEQUENT ENCOUNTER: ICD-10-CM

## 2023-07-28 RX ORDER — CEPHALEXIN 500 MG/1
500 CAPSULE ORAL 2 TIMES DAILY
COMMUNITY
Start: 2023-07-16

## 2023-07-28 RX ORDER — GLUCOSAMINE/CHONDR SU A SOD 750-600 MG
1 TABLET ORAL DAILY
COMMUNITY
Start: 2023-04-27

## 2023-07-28 RX ORDER — SULFAMETHOXAZOLE AND TRIMETHOPRIM 800; 160 MG/1; MG/1
1 TABLET ORAL 2 TIMES DAILY
Qty: 10 TABLET | Refills: 0 | Status: SHIPPED | OUTPATIENT
Start: 2023-07-28 | End: 2023-08-02

## 2023-07-28 RX ORDER — ACETAMINOPHEN 325 MG/1
TABLET ORAL
COMMUNITY

## 2023-07-28 NOTE — PATIENT INSTRUCTIONS
The antibiotic that [facility] prescribed you does not cover the infection. I have sent a different one called Bactrim twice daily for 5 days. Please follow up with Dr. Clara Kothari for monitoring of your Vertigo, Lung nodules, and Thyroid nodules    Physical therapy referral placed for vestibular therapy    The medication list included in this document is our record of what you are currently taking, including any changes that were made at today's visit. If you find any differences when compared to your medications at home, or have any questions that were not answered at your visit, please contact the office.

## 2023-08-04 ENCOUNTER — TELEPHONE (OUTPATIENT)
Dept: PRIMARY CARE CLINIC | Age: 63
End: 2023-08-04

## 2023-08-04 NOTE — TELEPHONE ENCOUNTER
Patient scheduled for PT evaluation 8/17/23. Patient states this is for vertigo she has been having. Can Dr. Olivier Figueredo please update order with vertigo diagnosis? Current order just states falls. Please review and advise.   Carla Ghotra on 8/4/23 at 1:37 PM EDT
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
## 2023-08-07 DIAGNOSIS — R42 VERTIGO: ICD-10-CM

## 2023-08-22 PROBLEM — R39.9 UTI SYMPTOMS: Status: ACTIVE | Noted: 2023-08-22

## 2023-08-22 PROBLEM — T38.0X5A CORTICOSTEROID-INDUCED OSTEOPOROSIS: Status: ACTIVE | Noted: 2023-08-22

## 2023-08-22 PROBLEM — E83.42 HYPOMAGNESEMIA: Status: ACTIVE | Noted: 2023-08-22

## 2023-08-22 PROBLEM — E11.9 TYPE 2 DIABETES MELLITUS WITHOUT COMPLICATIONS (MULTI): Status: ACTIVE | Noted: 2023-08-22

## 2023-08-22 PROBLEM — E11.40 DIABETIC NEUROPATHY (MULTI): Status: ACTIVE | Noted: 2023-08-22

## 2023-08-22 PROBLEM — M19.90 ARTHRITIS: Status: ACTIVE | Noted: 2023-08-22

## 2023-08-22 PROBLEM — E20.89: Status: ACTIVE | Noted: 2023-08-22

## 2023-08-22 PROBLEM — D50.9 IRON DEFICIENCY ANEMIA: Status: ACTIVE | Noted: 2023-08-22

## 2023-08-22 PROBLEM — N18.9 CHRONIC KIDNEY DISEASE: Status: ACTIVE | Noted: 2023-08-22

## 2023-08-22 PROBLEM — I95.1 ORTHOSTATIC HYPOTENSION: Status: ACTIVE | Noted: 2023-08-22

## 2023-08-22 PROBLEM — G56.00 CARPAL TUNNEL SYNDROME: Status: ACTIVE | Noted: 2023-08-22

## 2023-08-22 PROBLEM — E78.5 HYPERLIPIDEMIA: Status: ACTIVE | Noted: 2023-08-22

## 2023-08-22 PROBLEM — K21.9 GERD (GASTROESOPHAGEAL REFLUX DISEASE): Status: ACTIVE | Noted: 2023-08-22

## 2023-08-22 PROBLEM — R50.9 FEVER: Status: ACTIVE | Noted: 2023-08-22

## 2023-08-22 PROBLEM — M65.30 ACQUIRED TRIGGER FINGER: Status: ACTIVE | Noted: 2023-08-22

## 2023-08-22 PROBLEM — R35.0 URINARY FREQUENCY: Status: ACTIVE | Noted: 2023-08-22

## 2023-08-22 PROBLEM — R79.89 ABNORMAL LFTS (LIVER FUNCTION TESTS): Status: ACTIVE | Noted: 2023-08-22

## 2023-08-22 PROBLEM — E55.9 VITAMIN D DEFICIENCY: Status: ACTIVE | Noted: 2023-08-22

## 2023-08-22 PROBLEM — E11.9 DIABETES MELLITUS (MULTI): Status: ACTIVE | Noted: 2023-08-22

## 2023-08-22 PROBLEM — Z96.1 PSEUDOPHAKIA OF BOTH EYES: Status: ACTIVE | Noted: 2023-08-22

## 2023-08-22 PROBLEM — R80.9 PROTEINURIA: Status: ACTIVE | Noted: 2023-08-22

## 2023-08-22 PROBLEM — K50.90 CROHN'S DISEASE (MULTI): Status: ACTIVE | Noted: 2023-08-22

## 2023-08-22 PROBLEM — M81.8 CORTICOSTEROID-INDUCED OSTEOPOROSIS: Status: ACTIVE | Noted: 2023-08-22

## 2023-08-22 PROBLEM — D75.89 MACROCYTOSIS: Status: ACTIVE | Noted: 2023-08-22

## 2023-08-22 PROBLEM — R61 NIGHT SWEAT: Status: ACTIVE | Noted: 2023-08-22

## 2023-08-22 PROBLEM — R94.6 ABNORMAL THYROID EXAM: Status: ACTIVE | Noted: 2023-08-22

## 2023-08-22 PROBLEM — H52.13 BILATERAL MYOPIA: Status: ACTIVE | Noted: 2023-08-22

## 2023-08-22 RX ORDER — ACETAMINOPHEN 325 MG/1
650 TABLET ORAL EVERY 4 HOURS PRN
COMMUNITY

## 2023-08-22 RX ORDER — SERTRALINE HYDROCHLORIDE 100 MG/1
TABLET, FILM COATED ORAL DAILY
COMMUNITY
Start: 2021-10-20

## 2023-08-22 RX ORDER — ACETAMINOPHEN 500 MG
2000 TABLET ORAL DAILY
COMMUNITY

## 2023-08-22 RX ORDER — TRAZODONE HYDROCHLORIDE 50 MG/1
1 TABLET ORAL NIGHTLY
COMMUNITY
Start: 2022-07-25

## 2023-08-22 RX ORDER — LEVOTHYROXINE SODIUM 50 UG/1
TABLET ORAL
COMMUNITY
Start: 2023-03-17

## 2023-08-22 RX ORDER — GABAPENTIN 300 MG/1
1 CAPSULE ORAL NIGHTLY
COMMUNITY
Start: 2023-03-15 | End: 2024-04-17 | Stop reason: SDUPTHER

## 2023-08-22 RX ORDER — CIPROFLOXACIN 500 MG/1
500 TABLET ORAL 2 TIMES DAILY
COMMUNITY
Start: 2022-10-20 | End: 2023-10-04 | Stop reason: ALTCHOICE

## 2023-08-22 RX ORDER — OMEPRAZOLE 40 MG/1
CAPSULE, DELAYED RELEASE ORAL
COMMUNITY
Start: 2023-03-17

## 2023-08-22 RX ORDER — OXYCODONE HYDROCHLORIDE 5 MG/1
TABLET ORAL
COMMUNITY
Start: 2022-11-24 | End: 2023-10-04 | Stop reason: ALTCHOICE

## 2023-08-22 RX ORDER — PREDNISONE 5 MG/1
1 TABLET ORAL DAILY
COMMUNITY
End: 2023-10-04

## 2023-08-22 RX ORDER — FENOFIBRATE 145 MG/1
1 TABLET, FILM COATED ORAL DAILY
COMMUNITY
Start: 2023-03-16

## 2023-08-22 RX ORDER — FLUCONAZOLE 200 MG/1
2 TABLET ORAL DAILY
COMMUNITY
Start: 2023-03-16 | End: 2024-01-22 | Stop reason: SDUPTHER

## 2023-08-22 RX ORDER — ICOSAPENT ETHYL 1 G/1
2 CAPSULE ORAL 2 TIMES DAILY
COMMUNITY
End: 2024-05-22 | Stop reason: SDUPTHER

## 2023-08-22 RX ORDER — DIPHENOXYLATE HYDROCHLORIDE AND ATROPINE SULFATE 2.5; .025 MG/1; MG/1
2 TABLET ORAL 4 TIMES DAILY
COMMUNITY
Start: 2014-04-22

## 2023-08-22 RX ORDER — SODIUM BICARBONATE 650 MG/1
2 TABLET ORAL EVERY 12 HOURS
COMMUNITY
Start: 2013-07-30

## 2023-08-22 RX ORDER — SULFAMETHOXAZOLE AND TRIMETHOPRIM 800; 160 MG/1; MG/1
1 TABLET ORAL 2 TIMES DAILY
COMMUNITY
Start: 2023-03-15 | End: 2023-10-04 | Stop reason: ALTCHOICE

## 2023-08-22 RX ORDER — MULTIVIT-MIN/IRON FUM/FOLIC AC 7.5 MG-4
1 TABLET ORAL DAILY
COMMUNITY

## 2023-08-22 RX ORDER — INSULIN LISPRO 100 [IU]/ML
INJECTION, SOLUTION INTRAVENOUS; SUBCUTANEOUS
COMMUNITY
Start: 2019-09-25 | End: 2024-01-17 | Stop reason: SDUPTHER

## 2023-08-22 RX ORDER — MAGNESIUM CHLORIDE 71.5 G/G
1 TABLET ORAL 2 TIMES DAILY
COMMUNITY

## 2023-08-22 RX ORDER — ROSUVASTATIN CALCIUM 20 MG/1
1 TABLET, COATED ORAL DAILY
COMMUNITY
Start: 2020-10-30

## 2023-09-07 VITALS — HEIGHT: 66 IN | BODY MASS INDEX: 24.31 KG/M2 | WEIGHT: 151.24 LBS

## 2023-09-14 NOTE — H&P
History of Present Illness:   History Present Illness:  Reason for surgery: thickened endometrial lining   HPI:      64yo female presents for D&C hysteroscopy in the setting of thickened endometrial lining incidentally found on CT scan during recent hospitalization in November for acute pancreatitis.    Preop labs: (3/3) Hgb 9.9, Cr 1.72  TVUS (11/22/22): Limited visibility due to bowel gas shadowing and uterine vascular calcifications within the myometrium. anechoic fluid collection noted in the partially visualized endometrial canal measuring up to 1.3cm.   CT A/P (11/20/22): endometrial thickening/fluid up to 1.1cm.    GynHx: last pap 2022 inadequate HPV neg, menopausal at age 39  MHx: hypothyrpoidism, athritis, HTN, carpal tunnel, CKD, Crohn's disease, T2DM, GERD, HLD,   SHx: D&C for AUB, EMB at that time negative; cholecystectomy, partial colectomy, BTL  Meds: basaglar 35/35, humalog 20/24/26, gabapenin, levothyroizine, omeprazole, pregnisone, rouvastatin, sertraline, trazodone, tricor  Allergies: todadol (swelling), IV contrast dye  FHx: reviewed and noncontributory  Social: former smoker, quit 2010, denies e/d      Allergies:        Allergies:  ·  contrast (specific type unknown) : Anaphylaxis  ·  Toradol : Anaphylaxis    Home Medication Review:   Home Medications Reviewed: yes     Impression/Procedure:   ·  Impression and Planned Procedure: hysteroscopic dilation and curttage       ERAS (Enhanced Recovery After Surgery):  ·  ERAS Patient: no     Review of Systems:   Review of Systems:  All Other Systems: All other systems reviewed and  are negative       Physical Exam by System:    Constitutional: well appearing, alert   Eyes: EOMI, no injection or icterus   Respiratory/Thorax: breathing comfortably on room  air   Cardiovascular: warm and well perfused   Extremities: Full ROM all extremities   Neurological: grossly intact, no focal deficits   Psychological: appropriate mood and affect     Consent:    COVID-19 Consent:  ·  COVID-19 Risk Consent Surgeon has reviewed key risks related to the risk of santos COVID-19 and if they contract COVID-19 what the risks are.     Attestation:   Note Completion:  I am a:  Resident/Fellow   Attending Attestation I saw and evaluated the patient.  I personally obtained the key and critical portions of the history and physical exam or was physically present for key and  critical portions performed by the resident/fellow. I reviewed the resident/fellow?s documentation and discussed the patient with the resident/fellow.  I agree with the resident/fellow?s medical decision making as documented in the note.     I personally evaluated the patient on 30-Mar-2023         Electronic Signatures:  Yocasta Quiles)  (Signed 30-Mar-2023 11:25)   Authored: Note Completion   Co-Signer: History of Present Illness, Allergies, Home Medication Review, Impression/Procedure, ERAS, Review of Systems, Physical Exam,  Consent, Note Completion  Luisa Umaña (Resident))  (Signed 29-Mar-2023 14:35)   Authored: History of Present Illness, Allergies, Home  Medication Review, Impression/Procedure, ERAS, Review of Systems, Physical Exam, Consent, Note Completion      Last Updated: 30-Mar-2023 11:25 by Yocasta Quiles)

## 2023-09-18 ENCOUNTER — TELEPHONE (OUTPATIENT)
Dept: FAMILY MEDICINE CLINIC | Age: 63
End: 2023-09-18

## 2023-09-18 DIAGNOSIS — N18.4 TYPE 2 DIABETES MELLITUS WITH STAGE 4 CHRONIC KIDNEY DISEASE, WITH LONG-TERM CURRENT USE OF INSULIN (HCC): Primary | ICD-10-CM

## 2023-09-18 DIAGNOSIS — E11.22 TYPE 2 DIABETES MELLITUS WITH STAGE 4 CHRONIC KIDNEY DISEASE, WITH LONG-TERM CURRENT USE OF INSULIN (HCC): Primary | ICD-10-CM

## 2023-09-18 DIAGNOSIS — Z79.4 TYPE 2 DIABETES MELLITUS WITH STAGE 4 CHRONIC KIDNEY DISEASE, WITH LONG-TERM CURRENT USE OF INSULIN (HCC): Primary | ICD-10-CM

## 2023-09-28 DIAGNOSIS — F51.01 PRIMARY INSOMNIA: ICD-10-CM

## 2023-09-29 DIAGNOSIS — Z86.2 HISTORY OF ANEMIA DUE TO CHRONIC KIDNEY DISEASE: Primary | ICD-10-CM

## 2023-09-29 DIAGNOSIS — N18.9 HISTORY OF ANEMIA DUE TO CHRONIC KIDNEY DISEASE: Primary | ICD-10-CM

## 2023-09-29 PROBLEM — D63.1 ANEMIA DUE TO CHRONIC KIDNEY DISEASE: Status: ACTIVE | Noted: 2023-08-22

## 2023-09-29 RX ORDER — TRAZODONE HYDROCHLORIDE 100 MG/1
100 TABLET ORAL
Qty: 90 TABLET | Refills: 1 | Status: SHIPPED | OUTPATIENT
Start: 2023-09-29

## 2023-09-29 RX ORDER — DIPHENHYDRAMINE HYDROCHLORIDE 50 MG/ML
50 INJECTION INTRAMUSCULAR; INTRAVENOUS AS NEEDED
Status: CANCELLED | OUTPATIENT
Start: 2023-10-04

## 2023-09-30 DIAGNOSIS — N18.9 HISTORY OF ANEMIA DUE TO CHRONIC KIDNEY DISEASE: Primary | ICD-10-CM

## 2023-09-30 DIAGNOSIS — Z86.2 HISTORY OF ANEMIA DUE TO CHRONIC KIDNEY DISEASE: Primary | ICD-10-CM

## 2023-09-30 PROBLEM — E20.9 HYPOPARATHYROIDISM (MULTI): Status: ACTIVE | Noted: 2023-09-30

## 2023-10-02 NOTE — OP NOTE
Post Operative Note:     PreOp Diagnosis: Postpmenopausal thickened endometrial  stripe, need for Pap   Post-Procedure Diagnosis: same   Procedure: 1. Pap screening  2. Hysteroscopy dilation & curettage   Surgeon: DELILAH Quiles   Resident/Fellow/Other Assistant: RC Brown; RC Umaña   Anesthesia: General LMA   I.V. Fluids: 500mL crystalloid   Estimated Blood Loss (mL): 1   Blood Replacement: none   Specimen: yes. Pap, endometrial curettings   Complications: none apparent   Findings: atrophic-appearing vagina, cervix, and  endometrial cavity   Patient Returned To/Condition: PACU; stable   Urine Output: not measured     Operative Report Dictated:  Dictation: not applicable - note contains Operative  Report   Operative Report:    Indication: 62yo female with postmenopausal thickened endometrial stripe discovered incidentally on CT while patient was admitted for acute pancreatitis, confirmed  on ultrasound.    Procedure: The patient was taken to the operating room where general anesthesia was administered. She was then positioned in the dorsal lithotomy position. The patient was then prepped and draped in the normal sterile fashion. Next, a speculum was placed  into the vagina and the cervix was visualized, flush with the vagina. A Pap was collected. The anterior lip of the cervix was grasped with a single-tooth tenaculum. The cervix was found to be already adequately dilated to accommodate the hysteroscope.  The Aveta hysteroscope was inserted through the cervical canal and the interior of the uterus was examined. Examination of the endometrial cavity revealed atrophy and scant fluffy material, but overall a normal-appearing atrophic endometrium. The Aveta  tissue resector device was inserted through the hysteroscope into the endometrial cavity. The resector was used to sample tissue from all four walls of the endometrial cavity. Once an adequate tissue sample had been obtained, the resector device was removed.   The specimen was sent to pathology.    The hysteroscope was withdrawn from the uterus. The single-toothed tenaculum was removed and hemostasis was noted at the tenaculum site. The speculum was removed. All counts were correct. Dr. Quiles was present for entire procedure. The patient was  taken from the operating room in stable condition after reversal of anesthesia.     Attestation:   Note Completion:  I am a: Resident/Fellow   Attending Attestation I was present for the entire procedure          Electronic Signatures:  Luisa Brown (Resident))  (Signed 30-Mar-2023 12:50)   Authored: Post Operative Note, Note Completion  Yocasta Quiles)  (Signed 30-Mar-2023 16:05)   Authored: Post Operative Note, Note Completion   Co-Signer: Post Operative Note, Note Completion      Last Updated: 30-Mar-2023 16:05 by Yocasta Quiles)

## 2023-10-04 ENCOUNTER — HOSPITAL ENCOUNTER (OUTPATIENT)
Dept: DIALYSIS | Facility: HOSPITAL | Age: 63
Setting detail: DIALYSIS SERIES
Discharge: STILL ADMITTED OR ADMINISTRATIVE | End: 2023-10-04
Payer: COMMERCIAL

## 2023-10-04 VITALS
HEART RATE: 96 BPM | WEIGHT: 134.26 LBS | DIASTOLIC BLOOD PRESSURE: 67 MMHG | SYSTOLIC BLOOD PRESSURE: 97 MMHG | BODY MASS INDEX: 22 KG/M2

## 2023-10-04 DIAGNOSIS — Z86.2 HISTORY OF ANEMIA DUE TO CHRONIC KIDNEY DISEASE: ICD-10-CM

## 2023-10-04 DIAGNOSIS — E20.9 HYPOPARATHYROIDISM, UNSPECIFIED HYPOPARATHYROIDISM TYPE (MULTI): Primary | ICD-10-CM

## 2023-10-04 DIAGNOSIS — N18.9 HISTORY OF ANEMIA DUE TO CHRONIC KIDNEY DISEASE: ICD-10-CM

## 2023-10-04 LAB
ALBUMIN SERPL BCP-MCNC: 3.6 G/DL (ref 3.4–5)
ANION GAP SERPL CALC-SCNC: 18 MMOL/L (ref 10–20)
BUN SERPL-MCNC: 29 MG/DL (ref 6–23)
CALCIUM SERPL-MCNC: 8.7 MG/DL (ref 8.6–10.6)
CHLORIDE SERPL-SCNC: 108 MMOL/L (ref 98–107)
CO2 SERPL-SCNC: 19 MMOL/L (ref 21–32)
CREAT SERPL-MCNC: 1.95 MG/DL (ref 0.5–1.05)
ERYTHROCYTE [DISTWIDTH] IN BLOOD BY AUTOMATED COUNT: 13 % (ref 11.5–14.5)
FERRITIN SERPL-MCNC: 980 NG/ML (ref 8–150)
GFR SERPL CREATININE-BSD FRML MDRD: 28 ML/MIN/1.73M*2
GLUCOSE SERPL-MCNC: 177 MG/DL (ref 74–99)
HCT VFR BLD AUTO: 31.5 % (ref 36–46)
HGB BLD-MCNC: 9.6 G/DL (ref 12–16)
IRON SATN MFR SERPL: 24 % (ref 25–45)
IRON SERPL-MCNC: 89 UG/DL (ref 35–150)
MCH RBC QN AUTO: 31.5 PG (ref 26–34)
MCHC RBC AUTO-ENTMCNC: 30.5 G/DL (ref 32–36)
MCV RBC AUTO: 103 FL (ref 80–100)
NRBC BLD-RTO: 0 /100 WBCS (ref 0–0)
PHOSPHATE SERPL-MCNC: 3 MG/DL (ref 2.5–4.9)
PLATELET # BLD AUTO: 250 X10*3/UL (ref 150–450)
PMV BLD AUTO: 10.8 FL (ref 7.5–11.5)
POTASSIUM SERPL-SCNC: 4.7 MMOL/L (ref 3.5–5.3)
RBC # BLD AUTO: 3.05 X10*6/UL (ref 4–5.2)
SODIUM SERPL-SCNC: 140 MMOL/L (ref 136–145)
TIBC SERPL-MCNC: 368 UG/DL (ref 240–445)
TRANSFERRIN SERPL-MCNC: 268 MG/DL (ref 200–360)
UIBC SERPL-MCNC: 279 UG/DL (ref 110–370)
WBC # BLD AUTO: 8.5 X10*3/UL (ref 4.4–11.3)

## 2023-10-04 PROCEDURE — 96374 THER/PROPH/DIAG INJ IV PUSH: CPT

## 2023-10-04 PROCEDURE — 84466 ASSAY OF TRANSFERRIN: CPT | Performed by: INTERNAL MEDICINE

## 2023-10-04 PROCEDURE — 6350000001 HC RX 635 EPOETIN >10,000 UNITS: Mod: JZ,JG

## 2023-10-04 PROCEDURE — 83540 ASSAY OF IRON: CPT | Performed by: INTERNAL MEDICINE

## 2023-10-04 PROCEDURE — 80069 RENAL FUNCTION PANEL: CPT | Performed by: INTERNAL MEDICINE

## 2023-10-04 PROCEDURE — 85027 COMPLETE CBC AUTOMATED: CPT | Performed by: INTERNAL MEDICINE

## 2023-10-04 PROCEDURE — 36415 COLL VENOUS BLD VENIPUNCTURE: CPT

## 2023-10-04 PROCEDURE — 82728 ASSAY OF FERRITIN: CPT | Performed by: INTERNAL MEDICINE

## 2023-10-04 PROCEDURE — 36415 COLL VENOUS BLD VENIPUNCTURE: CPT | Performed by: INTERNAL MEDICINE

## 2023-10-04 PROCEDURE — 96375 TX/PRO/DX INJ NEW DRUG ADDON: CPT

## 2023-10-04 RX ORDER — DIPHENHYDRAMINE HYDROCHLORIDE 50 MG/ML
50 INJECTION INTRAMUSCULAR; INTRAVENOUS AS NEEDED
Status: CANCELLED | OUTPATIENT
Start: 2023-11-01

## 2023-10-04 RX ADMIN — EPOETIN ALFA 60000 UNITS: 20000 SOLUTION INTRAVENOUS; SUBCUTANEOUS at 14:15

## 2023-10-04 ASSESSMENT — PAIN - FUNCTIONAL ASSESSMENT: PAIN_FUNCTIONAL_ASSESSMENT: 0-10

## 2023-10-04 ASSESSMENT — PAIN SCALES - GENERAL: PAINLEVEL_OUTOF10: 6

## 2023-10-04 NOTE — NURSING NOTE
"10/4/23  1415 Patient states multiple falls at home. Hurt R shoulder end of July of this year. Has intermittent lightheadedness. Has only lost consciousness once, during July fall incident. Patient states she checks Bps at home and they are around 90/50 but states her pressures fluctuate up to 110/60. Patient endorses bad appetite, eating 1-2 meals a day. No metallic taste in mouth. Energy level: \"I have no energy\". Tires with simple ADLs such as laundry folding. Endorses SOBOE. No edema noted. Patient states \"I'm forgetful\" states she recently forgot a doctors appointment.         Visit Vitals  BP 97/67 (Patient Position: Standing)   Pulse 96   Wt 60.9 kg (134 lb 4.2 oz)   BMI 22.00 kg/m²   BSA 1.68 m²      "

## 2023-10-20 ENCOUNTER — OFFICE VISIT (OUTPATIENT)
Dept: FAMILY MEDICINE CLINIC | Age: 63
End: 2023-10-20
Payer: COMMERCIAL

## 2023-10-20 VITALS
OXYGEN SATURATION: 100 % | BODY MASS INDEX: 22.23 KG/M2 | HEIGHT: 65 IN | TEMPERATURE: 97.7 F | SYSTOLIC BLOOD PRESSURE: 138 MMHG | DIASTOLIC BLOOD PRESSURE: 70 MMHG | HEART RATE: 95 BPM | WEIGHT: 133.4 LBS

## 2023-10-20 DIAGNOSIS — N30.01 ACUTE CYSTITIS WITH HEMATURIA: Primary | ICD-10-CM

## 2023-10-20 DIAGNOSIS — M25.511 CHRONIC RIGHT SHOULDER PAIN: ICD-10-CM

## 2023-10-20 DIAGNOSIS — G89.29 CHRONIC RIGHT SHOULDER PAIN: ICD-10-CM

## 2023-10-20 DIAGNOSIS — Z23 NEED FOR INFLUENZA VACCINATION: ICD-10-CM

## 2023-10-20 DIAGNOSIS — R30.0 DYSURIA: ICD-10-CM

## 2023-10-20 LAB
BILIRUBIN, POC: NEGATIVE
BLOOD URINE, POC: ABNORMAL
CLARITY, POC: ABNORMAL
COLOR, POC: YELLOW
GLUCOSE URINE, POC: NEGATIVE
KETONES, POC: NEGATIVE
LEUKOCYTE EST, POC: ABNORMAL
NITRITE, POC: NEGATIVE
PH, POC: 5.5
PROTEIN, POC: 100
SPECIFIC GRAVITY, POC: 1.02
UROBILINOGEN, POC: 0.2

## 2023-10-20 PROCEDURE — 90674 CCIIV4 VAC NO PRSV 0.5 ML IM: CPT | Performed by: STUDENT IN AN ORGANIZED HEALTH CARE EDUCATION/TRAINING PROGRAM

## 2023-10-20 PROCEDURE — 3074F SYST BP LT 130 MM HG: CPT | Performed by: STUDENT IN AN ORGANIZED HEALTH CARE EDUCATION/TRAINING PROGRAM

## 2023-10-20 PROCEDURE — 90471 IMMUNIZATION ADMIN: CPT | Performed by: STUDENT IN AN ORGANIZED HEALTH CARE EDUCATION/TRAINING PROGRAM

## 2023-10-20 PROCEDURE — 99215 OFFICE O/P EST HI 40 MIN: CPT | Performed by: STUDENT IN AN ORGANIZED HEALTH CARE EDUCATION/TRAINING PROGRAM

## 2023-10-20 PROCEDURE — 81003 URINALYSIS AUTO W/O SCOPE: CPT | Performed by: STUDENT IN AN ORGANIZED HEALTH CARE EDUCATION/TRAINING PROGRAM

## 2023-10-20 PROCEDURE — 3078F DIAST BP <80 MM HG: CPT | Performed by: STUDENT IN AN ORGANIZED HEALTH CARE EDUCATION/TRAINING PROGRAM

## 2023-10-20 RX ORDER — SULFAMETHOXAZOLE AND TRIMETHOPRIM 800; 160 MG/1; MG/1
1 TABLET ORAL 2 TIMES DAILY
Qty: 14 TABLET | Refills: 0 | Status: SHIPPED | OUTPATIENT
Start: 2023-10-20 | End: 2023-10-27

## 2023-10-20 NOTE — PROGRESS NOTES
sulfamethoxazole-trimethoprim (BACTRIM DS;SEPTRA DS) 800-160 MG per tablet     Sig: Take 1 tablet by mouth 2 times daily for 7 days     Dispense:  14 tablet     Refill:  0     Medications Discontinued During This Encounter   Medication Reason    cephALEXin (KEFLEX) 500 MG capsule Therapy completed     Return in about 3 months (around 1/20/2024) for follow up.    40 minutes spent talking with patient and reviewing chart. Reviewed with the patient: current clinical status,medications, activities and diet. Side effects, adverse effects of the medication prescribed today, as well as treatment plan/ rationale and result expectations have been discussed with the patient who expresses understanding and desires to proceed. Close follow up to evaluate treatment results and for coordination of care. I have reviewed the patient's medical history in detail and updated the computerized patient record. Please note, this report has been partially produced using speech recognition software and may cause  and /or contain errors related to that system including grammar, punctuation and spelling as well as words and phrases that may seem inappropriate. If there are questions or concerns please feel free to contact me to clarify.     Catia Bruner, DO

## 2023-10-23 ENCOUNTER — TELEPHONE (OUTPATIENT)
Dept: FAMILY MEDICINE CLINIC | Age: 63
End: 2023-10-23

## 2023-10-23 ASSESSMENT — ENCOUNTER SYMPTOMS
SORE THROAT: 0
ABDOMINAL PAIN: 0
NAUSEA: 0
DIARRHEA: 0
WHEEZING: 0
RHINORRHEA: 0
COUGH: 0
SHORTNESS OF BREATH: 0
VOMITING: 0
EYE DISCHARGE: 0

## 2023-10-23 NOTE — TELEPHONE ENCOUNTER
Patient's  dropped off [place] paperwork to be [place] out, he is requesting a signed copy. Paperwork scanned into media and put in mailbox.

## 2023-10-24 NOTE — TELEPHONE ENCOUNTER
Lucero Murdock called back and asked that you give her a return call.   (Returning your call)  Thank you

## 2023-10-24 NOTE — TELEPHONE ENCOUNTER
Spoke with patient & answered all questions. Informed FMLA paperwork is completed, but we are having a hard time getting it to fax & I will let her know once it goes through so she can come get a copy of it.

## 2023-10-25 NOTE — TELEPHONE ENCOUNTER
CH[employer] fax went through & she could  a copy at her convenience at the  & to call back with questions.

## 2023-10-26 ENCOUNTER — APPOINTMENT (OUTPATIENT)
Dept: NEPHROLOGY | Facility: CLINIC | Age: 63
End: 2023-10-26
Payer: COMMERCIAL

## 2023-10-31 ENCOUNTER — OFFICE VISIT (OUTPATIENT)
Dept: GASTROENTEROLOGY | Facility: HOSPITAL | Age: 63
End: 2023-10-31
Payer: COMMERCIAL

## 2023-10-31 VITALS
RESPIRATION RATE: 20 BRPM | DIASTOLIC BLOOD PRESSURE: 81 MMHG | BODY MASS INDEX: 22.74 KG/M2 | SYSTOLIC BLOOD PRESSURE: 126 MMHG | WEIGHT: 136.5 LBS | TEMPERATURE: 97.2 F | HEIGHT: 65 IN | HEART RATE: 80 BPM | OXYGEN SATURATION: 99 %

## 2023-10-31 DIAGNOSIS — K50.818 CROHN'S DISEASE OF BOTH SMALL AND LARGE INTESTINE WITH OTHER COMPLICATION (MULTI): ICD-10-CM

## 2023-10-31 DIAGNOSIS — R29.6 FALLS FREQUENTLY: ICD-10-CM

## 2023-10-31 DIAGNOSIS — R10.32 LEFT LOWER QUADRANT ABDOMINAL PAIN: Primary | ICD-10-CM

## 2023-10-31 PROCEDURE — 3066F NEPHROPATHY DOC TX: CPT | Performed by: INTERNAL MEDICINE

## 2023-10-31 PROCEDURE — 3044F HG A1C LEVEL LT 7.0%: CPT | Performed by: INTERNAL MEDICINE

## 2023-10-31 PROCEDURE — 99214 OFFICE O/P EST MOD 30 MIN: CPT | Performed by: INTERNAL MEDICINE

## 2023-10-31 PROCEDURE — 3079F DIAST BP 80-89 MM HG: CPT | Performed by: INTERNAL MEDICINE

## 2023-10-31 PROCEDURE — 1036F TOBACCO NON-USER: CPT | Performed by: INTERNAL MEDICINE

## 2023-10-31 PROCEDURE — 3074F SYST BP LT 130 MM HG: CPT | Performed by: INTERNAL MEDICINE

## 2023-10-31 RX ORDER — PREDNISONE 5 MG/1
10 TABLET ORAL DAILY
Qty: 180 TABLET | Refills: 3 | Status: SHIPPED | OUTPATIENT
Start: 2023-10-31 | End: 2024-10-30

## 2023-10-31 SDOH — ECONOMIC STABILITY: FOOD INSECURITY: WITHIN THE PAST 12 MONTHS, THE FOOD YOU BOUGHT JUST DIDN'T LAST AND YOU DIDN'T HAVE MONEY TO GET MORE.: NEVER TRUE

## 2023-10-31 SDOH — ECONOMIC STABILITY: FOOD INSECURITY: WITHIN THE PAST 12 MONTHS, YOU WORRIED THAT YOUR FOOD WOULD RUN OUT BEFORE YOU GOT MONEY TO BUY MORE.: NEVER TRUE

## 2023-10-31 ASSESSMENT — ENCOUNTER SYMPTOMS
DEPRESSION: 0
LOSS OF SENSATION IN FEET: 1
OCCASIONAL FEELINGS OF UNSTEADINESS: 0

## 2023-10-31 ASSESSMENT — PAIN SCALES - GENERAL: PAINLEVEL: 6

## 2023-10-31 NOTE — PATIENT INSTRUCTIONS
Thanks for coming in for follow-up today.  I am uncertain as to the cause for your frequent falls and weakness.  It could be related to your prednisone.  As we discussed today:    1) check labs  2) check stool fecal calprotectin  3) begin prednisone (5 mg tablets) take 2 tablets once daily in the morning  4) I have referred you to neurology to evaluate your falls and muscle weakness  5) Dr. Hussein will contact you with the labs to discuss any further evaluation

## 2023-10-31 NOTE — PROGRESS NOTES
Subjective   Patient ID: Agnes M Reyes is a 63 y.o. female who presents for Follow-up for Crohn's disease.  HPI  F/U for 64 yo woman with Crohn's disease s/p subtotal colectomy with ileorectal anastomosis 2007.     2008 mild anastomostic dz, started Humira.; complete healing with normal F/S in 2012 and 2016 (sigmoid diverticulosis).    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  Path focal acute ileitis.  Stopped Humira.    11/2022 hospitalized with 1-week of n/v, generalized weakness, decreased PO intake and fevers (max 102.8) and two days of epigastric pain. CT A/P (non-contrast given severe anaphylaxis allergy to contrast) showed mild enlargement and edema of the pancreatic head and uncinated process with subtle surrounding stranding likely representing acute edematous interstitial pancreatitis.     Last seen about one year ago.  Since seen, she has had multiple falls.  In 7/2023 fell down 8 stairs and broke a rib.  When falls, she notes that she feels weak. First fall was March 2023. She has fallen up the stairs, down the stairs, and on the way to the bathroom.  Unable to stand a long time.    She has not been on prednisone for several months.  She ran out of prednisone and did not refill.  She thinks ran out in May 2023.    Diarrhea also worse.  Going to BR about 8 daily.  Waking at night to move bowels and this is newer.      No oral ulcers.  No new rash.  No eye inflammation.  No acute joint inflammation.    (+) tremors;  feels weak al over.  Difficult to hold her head up.      Weight is down a lot per her; at least 10 lbs in past 9 months. Swallowing OK.  Appetite varies, but able eat.  No emesis.  Some burning pain  in toes bilat, but no real numbness in feet.  Review of Systems  Complete review of systems otherwise negative per complaint   Objective   Physical Exam  Vital signs reviewed  Patient alert and oriented in no acute distress.  However, she appeared more ill than previously.  She has obviously lost some weight.  Anicteric  No cervical adenopathy  Cardiac exam regular rate and rhythm S1-S2 without murmurs gallops or rubs  Lungs clear to auscultation bilaterally  Abdomen soft and nontender without organomegaly or mass.  No rebound or guarding.  Bowel sounds present  Extremities without edema.  Neurologically grossly intact, however she is weak.  In order to get up from seated position she needs to push off with both hands on the chair.  She is walking with a cane.  Her gait is slightly unsteady.  Assessment/Plan   #CD-although she has been off of Humira now since 2019, and although she has some increased loose stool, I would be a bit surprised if all of her new debility is related to worsening Crohn's disease.  Bowels have picked up some from 2-3 a day to 4-6 a day, however this is not a huge increase.  We will go ahead and check a stool fecal calprotectin and some inflammatory markers to see if disease is active.  If the calprotectin is elevated, she will need a sigmoidoscopy.  No treatment for this currently.    #Weakness with weight loss and multiple falls-the etiology for this is uncertain.  It does not appear that she has had true syncope.  She just has what appears to be muscle weakness.  Some of this could be related to having stopped her prednisone.  She has been on low-dose prednisone for years and is presumed to be adrenally insufficient, in part related to disseminated histoplasmosis, and, in part related to her chronic steroid use for her Crohn's disease.  We will go ahead and put her back on prednisone 10 mg once daily and see if symptoms improve for pending the results of some laboratory studies.  We will  also refer her to neurology for evaluation.    #She will continue to follow-up with her other physicians for her diabetes and chronic kidney disease.

## 2023-11-02 ENCOUNTER — HOSPITAL ENCOUNTER (OUTPATIENT)
Dept: DIALYSIS | Facility: HOSPITAL | Age: 63
Setting detail: DIALYSIS SERIES
Discharge: STILL ADMITTED OR ADMINISTRATIVE | End: 2023-11-02
Payer: COMMERCIAL

## 2023-11-02 VITALS
SYSTOLIC BLOOD PRESSURE: 115 MMHG | HEART RATE: 96 BPM | DIASTOLIC BLOOD PRESSURE: 75 MMHG | BODY MASS INDEX: 22.49 KG/M2 | WEIGHT: 135.14 LBS

## 2023-11-02 DIAGNOSIS — N18.9 HISTORY OF ANEMIA DUE TO CHRONIC KIDNEY DISEASE: ICD-10-CM

## 2023-11-02 DIAGNOSIS — Z86.2 HISTORY OF ANEMIA DUE TO CHRONIC KIDNEY DISEASE: ICD-10-CM

## 2023-11-02 DIAGNOSIS — E20.9 HYPOPARATHYROIDISM, UNSPECIFIED HYPOPARATHYROIDISM TYPE (MULTI): Primary | ICD-10-CM

## 2023-11-02 LAB
ALBUMIN SERPL BCP-MCNC: 3.7 G/DL (ref 3.4–5)
ANION GAP SERPL CALC-SCNC: 17 MMOL/L (ref 10–20)
BUN SERPL-MCNC: 36 MG/DL (ref 6–23)
CALCIUM SERPL-MCNC: 9.1 MG/DL (ref 8.6–10.6)
CHLORIDE SERPL-SCNC: 107 MMOL/L (ref 98–107)
CO2 SERPL-SCNC: 19 MMOL/L (ref 21–32)
CREAT SERPL-MCNC: 1.75 MG/DL (ref 0.5–1.05)
ERYTHROCYTE [DISTWIDTH] IN BLOOD BY AUTOMATED COUNT: 14.2 % (ref 11.5–14.5)
GFR SERPL CREATININE-BSD FRML MDRD: 32 ML/MIN/1.73M*2
GLUCOSE SERPL-MCNC: 159 MG/DL (ref 74–99)
HCT VFR BLD AUTO: 30.2 % (ref 36–46)
HGB BLD-MCNC: 9.5 G/DL (ref 12–16)
MCH RBC QN AUTO: 30 PG (ref 26–34)
MCHC RBC AUTO-ENTMCNC: 31.5 G/DL (ref 32–36)
MCV RBC AUTO: 95 FL (ref 80–100)
NRBC BLD-RTO: 0 /100 WBCS (ref 0–0)
PHOSPHATE SERPL-MCNC: 4.6 MG/DL (ref 2.5–4.9)
PLATELET # BLD AUTO: 261 X10*3/UL (ref 150–450)
POTASSIUM SERPL-SCNC: 5 MMOL/L (ref 3.5–5.3)
RBC # BLD AUTO: 3.17 X10*6/UL (ref 4–5.2)
SODIUM SERPL-SCNC: 138 MMOL/L (ref 136–145)
WBC # BLD AUTO: 7.8 X10*3/UL (ref 4.4–11.3)

## 2023-11-02 PROCEDURE — 96374 THER/PROPH/DIAG INJ IV PUSH: CPT

## 2023-11-02 PROCEDURE — 85027 COMPLETE CBC AUTOMATED: CPT | Performed by: INTERNAL MEDICINE

## 2023-11-02 PROCEDURE — 80069 RENAL FUNCTION PANEL: CPT | Performed by: INTERNAL MEDICINE

## 2023-11-02 PROCEDURE — 36415 COLL VENOUS BLD VENIPUNCTURE: CPT

## 2023-11-02 PROCEDURE — 6350000001 HC RX 635 EPOETIN >10,000 UNITS: Mod: JZ,JG

## 2023-11-02 PROCEDURE — 36415 COLL VENOUS BLD VENIPUNCTURE: CPT | Performed by: INTERNAL MEDICINE

## 2023-11-02 RX ORDER — DIPHENHYDRAMINE HYDROCHLORIDE 50 MG/ML
50 INJECTION INTRAMUSCULAR; INTRAVENOUS AS NEEDED
Status: CANCELLED | OUTPATIENT
Start: 2023-11-29

## 2023-11-02 RX ADMIN — EPOETIN ALFA 60000 UNITS: 20000 SOLUTION INTRAVENOUS; SUBCUTANEOUS at 14:01

## 2023-11-02 NOTE — PROGRESS NOTES
"Presents for epogen and lab draw. Patient states she is \"tired all the time, weak all the time\". Endorses SOB during activity and rest. Appetite is \"sometimes ok sometimes not\". No metallic taste in mouth. No N/V. No edema noted.     Vitals:    11/02/23 1300   Weight: 61.3 kg (135 lb 2.3 oz)      Vitals:    11/02/23 1349   BP: 147/86   Pulse: 85      "

## 2023-11-07 ENCOUNTER — APPOINTMENT (OUTPATIENT)
Dept: INFECTIOUS DISEASES | Facility: CLINIC | Age: 63
End: 2023-11-07
Payer: COMMERCIAL

## 2023-11-09 ENCOUNTER — OFFICE VISIT (OUTPATIENT)
Dept: NEPHROLOGY | Facility: CLINIC | Age: 63
End: 2023-11-09
Payer: COMMERCIAL

## 2023-11-09 ENCOUNTER — LAB (OUTPATIENT)
Dept: LAB | Facility: HOSPITAL | Age: 63
End: 2023-11-09
Payer: COMMERCIAL

## 2023-11-09 VITALS
DIASTOLIC BLOOD PRESSURE: 82 MMHG | SYSTOLIC BLOOD PRESSURE: 141 MMHG | BODY MASS INDEX: 22.33 KG/M2 | HEIGHT: 65 IN | HEART RATE: 82 BPM | WEIGHT: 134 LBS

## 2023-11-09 DIAGNOSIS — N18.30 STAGE 3 CHRONIC KIDNEY DISEASE, UNSPECIFIED WHETHER STAGE 3A OR 3B CKD (MULTI): ICD-10-CM

## 2023-11-09 DIAGNOSIS — D63.1 ANEMIA DUE TO STAGE 3 CHRONIC KIDNEY DISEASE, UNSPECIFIED WHETHER STAGE 3A OR 3B CKD (MULTI): ICD-10-CM

## 2023-11-09 DIAGNOSIS — N18.4 CHRONIC KIDNEY DISEASE, STAGE 4 (SEVERE) (MULTI): Primary | ICD-10-CM

## 2023-11-09 DIAGNOSIS — N18.30 ANEMIA DUE TO STAGE 3 CHRONIC KIDNEY DISEASE, UNSPECIFIED WHETHER STAGE 3A OR 3B CKD (MULTI): ICD-10-CM

## 2023-11-09 DIAGNOSIS — N18.4 CHRONIC KIDNEY DISEASE, STAGE 4 (SEVERE) (MULTI): ICD-10-CM

## 2023-11-09 LAB
25(OH)D3 SERPL-MCNC: 36 NG/ML (ref 30–100)
ALBUMIN SERPL BCP-MCNC: 3.7 G/DL (ref 3.4–5)
ANION GAP SERPL CALC-SCNC: 15 MMOL/L (ref 10–20)
APPEARANCE UR: ABNORMAL
BACTERIA #/AREA URNS AUTO: ABNORMAL /HPF
BILIRUB UR STRIP.AUTO-MCNC: NEGATIVE MG/DL
BUN SERPL-MCNC: 37 MG/DL (ref 6–23)
CALCIUM SERPL-MCNC: 9.1 MG/DL (ref 8.6–10.6)
CHLORIDE SERPL-SCNC: 106 MMOL/L (ref 98–107)
CO2 SERPL-SCNC: 22 MMOL/L (ref 21–32)
COLOR UR: YELLOW
CREAT SERPL-MCNC: 1.87 MG/DL (ref 0.5–1.05)
CREAT UR-MCNC: 107 MG/DL (ref 20–320)
GFR SERPL CREATININE-BSD FRML MDRD: 30 ML/MIN/1.73M*2
GLUCOSE SERPL-MCNC: 125 MG/DL (ref 74–99)
GLUCOSE UR STRIP.AUTO-MCNC: NEGATIVE MG/DL
HCT VFR BLD AUTO: 34.2 % (ref 36–46)
HGB BLD-MCNC: 9.8 G/DL (ref 12–16)
HOLD SPECIMEN: NORMAL
HYALINE CASTS #/AREA URNS AUTO: ABNORMAL /LPF
KETONES UR STRIP.AUTO-MCNC: NEGATIVE MG/DL
LEUKOCYTE ESTERASE UR QL STRIP.AUTO: ABNORMAL
MAGNESIUM SERPL-MCNC: 1.61 MG/DL (ref 1.6–2.4)
MICROALBUMIN UR-MCNC: 406.6 MG/L
MICROALBUMIN/CREAT UR: 380 UG/MG CREAT
MUCOUS THREADS #/AREA URNS AUTO: ABNORMAL /LPF
NITRITE UR QL STRIP.AUTO: NEGATIVE
PH UR STRIP.AUTO: 5 [PH]
PHOSPHATE SERPL-MCNC: 4.9 MG/DL (ref 2.5–4.9)
POTASSIUM SERPL-SCNC: 4.9 MMOL/L (ref 3.5–5.3)
PROT UR STRIP.AUTO-MCNC: ABNORMAL MG/DL
PTH-INTACT SERPL-MCNC: 11.1 PG/ML (ref 18.5–88)
RBC # UR STRIP.AUTO: NEGATIVE /UL
RBC #/AREA URNS AUTO: ABNORMAL /HPF
SODIUM SERPL-SCNC: 138 MMOL/L (ref 136–145)
SP GR UR STRIP.AUTO: 1.01
UROBILINOGEN UR STRIP.AUTO-MCNC: <2 MG/DL
WBC #/AREA URNS AUTO: >50 /HPF

## 2023-11-09 PROCEDURE — 1036F TOBACCO NON-USER: CPT | Performed by: INTERNAL MEDICINE

## 2023-11-09 PROCEDURE — 3077F SYST BP >= 140 MM HG: CPT | Performed by: INTERNAL MEDICINE

## 2023-11-09 PROCEDURE — 83970 ASSAY OF PARATHORMONE: CPT

## 2023-11-09 PROCEDURE — 36415 COLL VENOUS BLD VENIPUNCTURE: CPT

## 2023-11-09 PROCEDURE — 3079F DIAST BP 80-89 MM HG: CPT | Performed by: INTERNAL MEDICINE

## 2023-11-09 PROCEDURE — 82570 ASSAY OF URINE CREATININE: CPT

## 2023-11-09 PROCEDURE — 99214 OFFICE O/P EST MOD 30 MIN: CPT | Performed by: INTERNAL MEDICINE

## 2023-11-09 PROCEDURE — 82043 UR ALBUMIN QUANTITATIVE: CPT

## 2023-11-09 PROCEDURE — 82306 VITAMIN D 25 HYDROXY: CPT

## 2023-11-09 PROCEDURE — 85018 HEMOGLOBIN: CPT

## 2023-11-09 PROCEDURE — 3066F NEPHROPATHY DOC TX: CPT | Performed by: INTERNAL MEDICINE

## 2023-11-09 PROCEDURE — 80069 RENAL FUNCTION PANEL: CPT

## 2023-11-09 PROCEDURE — 81001 URINALYSIS AUTO W/SCOPE: CPT

## 2023-11-09 PROCEDURE — 85014 HEMATOCRIT: CPT

## 2023-11-09 PROCEDURE — 82610 CYSTATIN C: CPT

## 2023-11-09 PROCEDURE — 3044F HG A1C LEVEL LT 7.0%: CPT | Performed by: INTERNAL MEDICINE

## 2023-11-09 PROCEDURE — 3062F POS MACROALBUMINURIA REV: CPT | Performed by: INTERNAL MEDICINE

## 2023-11-09 PROCEDURE — 83735 ASSAY OF MAGNESIUM: CPT

## 2023-11-09 ASSESSMENT — PAIN SCALES - GENERAL: PAINLEVEL: 8

## 2023-11-11 LAB
CYSTATIN C SERPL-MCNC: 2.3 MG/L (ref 0.5–1.2)
GFR/BSA.PRED SERPLBLD CYS-BASED-ARV: 24 ML/MIN/BSA

## 2023-11-11 NOTE — PROGRESS NOTES
"Agnes M Reyes is a 63 y.o. female here in follow up for CKD    Subjective   Comes today accompanied by daughter who is now assisting mom with her care. Patient reports having lost a lot of weight, feeling weak, prone to falls. She was recently seen by Dr. Hussein whooo referred her to neurology for furtehr evaluation. Also there was suspicion of adrenal insufficiency after steorids discontinuation. Patient back on steroids now, no significant change in her clinical status since restarting them. Appetite is so-so. Denies having increase in BM frequency or change in consistency. She has some dysuria, so we will send a UA and culture ti address this today. She attends now renal treatment clinic for anemia management, though she has not had yet a significant improvent in H/H. She endorses drop in BP upon standing associated with fear of syncope and she no longer takes Amlodipine for HTN.       ROS  All the rest reviewed and negative.     Objective     Vital signs    /82 (BP Location: Left arm, Patient Position: Sitting, BP Cuff Size: Adult)   Pulse 82   Ht 1.651 m (5' 5\")   Wt 60.8 kg (134 lb)   BMI 22.30 kg/m²     Physical Exam  Constitutional:  well appearing, in NAD  Psychiatric:  appropriate mood and behavior    HEENT: NC/AT, clear sclerae, moist mucous membranes  Cardiovascular: normal S1, S2, RRR,  no murmurs, gallop or rubs  Pulmonary:  Normal breath sounds  Extremities: no edema  Skin:  warm and dry    Meds    Current Outpatient Medications:     acetaminophen (Tylenol) 325 mg tablet, Take 2 tablets (650 mg) by mouth every 4 hours if needed., Disp: , Rfl:     blood sugar diagnostic, drum (Accu-Chek Compact Plus Test) strip, test blood glucose 3 x/ day, Disp: , Rfl:     cholecalciferol (Vitamin D-3) 50 mcg (2,000 unit) capsule, Take 1 capsule (2,000 Units) by mouth once daily., Disp: , Rfl:     diphenoxylate-atropine (Lomotil) 2.5-0.025 mg tablet, Take 2 tablets by mouth 4 times a day. TAKE 2 TABLETS 4 " TIMES DAILY AS NEEDED FOR DIARRHEA., Disp: , Rfl:     fenofibrate (Tricor) 145 mg tablet, Take 1 tablet (145 mg) by mouth once daily., Disp: , Rfl:     fluconazole (Diflucan) 200 mg tablet, Take 2 tablets (400 mg) by mouth once daily., Disp: , Rfl:     gabapentin (Neurontin) 300 mg capsule, Take 1 capsule (300 mg) by mouth once daily at bedtime., Disp: , Rfl:     icosapent ethyL (Vascepa) 1 gram capsule, Take 2 capsules (2 g) by mouth 2 times a day., Disp: , Rfl:     insulin lispro (HumaLOG KwikPen Insulin) 100 unit/mL injection, Inject under the skin. INJECT 20 units before breakfast, 24 units before lunch, and 26 units before dinner, Disp: , Rfl:     levothyroxine (Synthroid, Levoxyl) 50 mcg tablet, , Disp: , Rfl:     magnesium chloride (Slow-Mag) 71.5 mg tablet,delayed release (DR/EC), Take 1 tablet (71.5 mg) by mouth 2 times a day., Disp: , Rfl:     multivitamin with minerals (multivit-min-iron fum-folic ac) tablet, Take 1 tablet by mouth once daily., Disp: , Rfl:     omeprazole (PriLOSEC) 40 mg DR capsule, , Disp: , Rfl:     predniSONE (Deltasone) 5 mg tablet, Take 2 tablets (10 mg) by mouth once daily., Disp: 180 tablet, Rfl: 3    rosuvastatin (Crestor) 20 mg tablet, Take 1 tablet (20 mg) by mouth once daily., Disp: , Rfl:     sertraline (Zoloft) 100 mg tablet, Take by mouth once daily., Disp: , Rfl:     sodium bicarbonate 650 mg tablet, Take 2 tablets (1,300 mg) by mouth every 12 hours., Disp: , Rfl:     traZODone (Desyrel) 50 mg tablet, Take 1 tablet (50 mg) by mouth once daily at bedtime., Disp: , Rfl:      Allergies  Allergies   Allergen Reactions    Dye Anaphylaxis     IV Contrast Dye    Iodides Anaphylaxis    Iodinated Contrast Media Anaphylaxis     STOPPED HEART    Toradol [Ketorolac] Shortness of breath and Swelling        Results  No results found for this or any previous visit (from the past 24 hour(s)).   Results from last 7 days   Lab Units 11/09/23  1651   SODIUM mmol/L 138   POTASSIUM mmol/L 4.9    CHLORIDE mmol/L 106   CO2 mmol/L 22   BUN mg/dL 37*   CREATININE mg/dL 1.87*   EGFR mL/min/1.73m*2 30*   GLUCOSE mg/dL 125*   CALCIUM mg/dL 9.1   PHOSPHORUS mg/dL 4.9     Results from last 7 days   Lab Units 11/09/23  1651   HEMOGLOBIN g/dL 9.8*   HEMATOCRIT % 34.2*     Results from last 7 days   Lab Units 11/09/23  1651   COLOR U  Yellow   PH U  5.0   SPEC GRAV UR  1.013   PROTEIN U mg/dL 100 (2+)*   BLOOD UR  NEGATIVE   NITRITE U  NEGATIVE   WBC UR /HPF >50*   BACTERIA UR /HPF 2+*     Imaging results      Assessment and Plan  CKD stage G3-4/A3 likely ischemic and diabetic nephropathy, low suspicion for biologics -induced renal disease; secondary amyloidosis possible. Serum creatinine today 1.87 mg/dl.  rendering a stable eGFR of ~30 mg/min/1.73. Urine studies with overt proteinuria however prior hyperkalemia prohibits use of ACEi or ARB for its treatment, unless administered with Lokelma. Orthostatic hypotension further complicating their use, so on hold for now. Cannot initiate Farxiga with recurrent UTI.     HTN. Sitting office BP generous, however with orthostatic hypotension will watch for now. Home BP control unknown. Echocardiogram in March with preserved EF, impaired relaxation.       Wt. loss, deconditioning - under evaluation.    Hypoparathyroidism, acquired- noted.    Anemia. H/H stable. FAZAL in renal treatment clinic. Add iron once ferritin below 1000. Check SPEP, with next blood draw.    Hypomagnesemia, corrected with ongoing Mg supplements.     Borderline low vitamin D level - supplements doubled today    Dysuria- UA and culture for suspected recurrent UTI.    RTC in follow up in 4 months.       Nani Burch MD

## 2023-11-21 ENCOUNTER — APPOINTMENT (OUTPATIENT)
Dept: INFECTIOUS DISEASES | Facility: CLINIC | Age: 63
End: 2023-11-21
Payer: COMMERCIAL

## 2023-11-30 ENCOUNTER — APPOINTMENT (OUTPATIENT)
Dept: DIALYSIS | Facility: HOSPITAL | Age: 63
End: 2023-11-30
Payer: COMMERCIAL

## 2023-12-04 ENCOUNTER — APPOINTMENT (OUTPATIENT)
Dept: DIALYSIS | Facility: HOSPITAL | Age: 63
End: 2023-12-04
Payer: COMMERCIAL

## 2023-12-04 ENCOUNTER — HOSPITAL ENCOUNTER (OUTPATIENT)
Dept: DIALYSIS | Facility: HOSPITAL | Age: 63
Setting detail: DIALYSIS SERIES
Discharge: HOME | End: 2023-12-04
Payer: COMMERCIAL

## 2023-12-04 ENCOUNTER — TELEPHONE (OUTPATIENT)
Dept: FAMILY MEDICINE CLINIC | Age: 63
End: 2023-12-04

## 2023-12-04 VITALS
HEART RATE: 82 BPM | WEIGHT: 130.73 LBS | SYSTOLIC BLOOD PRESSURE: 144 MMHG | DIASTOLIC BLOOD PRESSURE: 80 MMHG | BODY MASS INDEX: 21.76 KG/M2

## 2023-12-04 DIAGNOSIS — Z86.2 HISTORY OF ANEMIA DUE TO CHRONIC KIDNEY DISEASE: ICD-10-CM

## 2023-12-04 DIAGNOSIS — E20.9 HYPOPARATHYROIDISM, UNSPECIFIED HYPOPARATHYROIDISM TYPE (MULTI): Primary | ICD-10-CM

## 2023-12-04 DIAGNOSIS — N18.9 HISTORY OF ANEMIA DUE TO CHRONIC KIDNEY DISEASE: ICD-10-CM

## 2023-12-04 LAB
ALBUMIN SERPL BCP-MCNC: 4 G/DL (ref 3.4–5)
ANION GAP SERPL CALC-SCNC: 18 MMOL/L (ref 10–20)
BUN SERPL-MCNC: 44 MG/DL (ref 6–23)
CALCIUM SERPL-MCNC: 9.4 MG/DL (ref 8.6–10.6)
CHLORIDE SERPL-SCNC: 102 MMOL/L (ref 98–107)
CO2 SERPL-SCNC: 22 MMOL/L (ref 21–32)
CREAT SERPL-MCNC: 2.7 MG/DL (ref 0.5–1.05)
ERYTHROCYTE [DISTWIDTH] IN BLOOD BY AUTOMATED COUNT: 14.4 % (ref 11.5–14.5)
GFR SERPL CREATININE-BSD FRML MDRD: 19 ML/MIN/1.73M*2
GLUCOSE SERPL-MCNC: 214 MG/DL (ref 74–99)
HCT VFR BLD AUTO: 35.8 % (ref 36–46)
HGB BLD-MCNC: 10.7 G/DL (ref 12–16)
MCH RBC QN AUTO: 31.1 PG (ref 26–34)
MCHC RBC AUTO-ENTMCNC: 29.9 G/DL (ref 32–36)
MCV RBC AUTO: 104 FL (ref 80–100)
NRBC BLD-RTO: 0 /100 WBCS (ref 0–0)
PHOSPHATE SERPL-MCNC: 4.7 MG/DL (ref 2.5–4.9)
PLATELET # BLD AUTO: 224 X10*3/UL (ref 150–450)
POTASSIUM SERPL-SCNC: 5.8 MMOL/L (ref 3.5–5.3)
RBC # BLD AUTO: 3.44 X10*6/UL (ref 4–5.2)
SODIUM SERPL-SCNC: 136 MMOL/L (ref 136–145)
WBC # BLD AUTO: 10.6 X10*3/UL (ref 4.4–11.3)

## 2023-12-04 PROCEDURE — 2500000004 HC RX 250 GENERAL PHARMACY W/ HCPCS (ALT 636 FOR OP/ED): Mod: JZ,JG

## 2023-12-04 PROCEDURE — 82374 ASSAY BLOOD CARBON DIOXIDE: CPT | Performed by: INTERNAL MEDICINE

## 2023-12-04 PROCEDURE — 36415 COLL VENOUS BLD VENIPUNCTURE: CPT

## 2023-12-04 PROCEDURE — 36415 COLL VENOUS BLD VENIPUNCTURE: CPT | Performed by: INTERNAL MEDICINE

## 2023-12-04 PROCEDURE — 96374 THER/PROPH/DIAG INJ IV PUSH: CPT

## 2023-12-04 PROCEDURE — 85027 COMPLETE CBC AUTOMATED: CPT | Performed by: INTERNAL MEDICINE

## 2023-12-04 RX ORDER — DIPHENHYDRAMINE HYDROCHLORIDE 50 MG/ML
50 INJECTION INTRAMUSCULAR; INTRAVENOUS AS NEEDED
Status: CANCELLED | OUTPATIENT
Start: 2023-12-28

## 2023-12-04 RX ADMIN — EPOETIN ALFA 60000 UNITS: 20000 SOLUTION INTRAVENOUS; SUBCUTANEOUS at 15:53

## 2023-12-04 NOTE — TELEPHONE ENCOUNTER
Patient called because she needs a 3 week extension for the FMLA. Her  is still having a hard time from his knee surgery. I told her she might have to come in for another appt but the first 30 minute ov is not until 1/9. She wants to know if she can get the extension without another appt. Please advise, thank you.

## 2023-12-04 NOTE — PROGRESS NOTES
How is your energy? Feels well.  How is your appetite? Appetite is good.  Any nausea and vomiting after eating? No.  Any diarrhea or constipation? Diarrhea has been better, saw GI MD recently.  Any SOB? SOBOE.   Any Edema in lower extremities? No.      GI MD started patient on 10mg prednisone every day. Patient feels better since starting this medication.    Vitals:    12/04/23 1534   Weight: 59.3 kg (130 lb 11.7 oz)      Vitals:    12/04/23 1534   BP: 144/80   Pulse: 82

## 2023-12-05 DIAGNOSIS — E87.5 HYPERKALEMIA: Primary | ICD-10-CM

## 2023-12-05 NOTE — TELEPHONE ENCOUNTER
Spoke with Stephen Gibson & informed we can extend & informed to have paperwork dropped off or faxed. She stated understanding.

## 2023-12-07 ENCOUNTER — LAB (OUTPATIENT)
Dept: LAB | Facility: LAB | Age: 63
End: 2023-12-07
Payer: COMMERCIAL

## 2023-12-07 DIAGNOSIS — E87.5 HYPERKALEMIA: ICD-10-CM

## 2023-12-07 DIAGNOSIS — K50.818 CROHN'S DISEASE OF BOTH SMALL AND LARGE INTESTINE WITH OTHER COMPLICATION (MULTI): ICD-10-CM

## 2023-12-07 LAB
ALBUMIN SERPL BCP-MCNC: 3.9 G/DL (ref 3.4–5)
ANION GAP SERPL CALC-SCNC: 16 MMOL/L (ref 10–20)
BUN SERPL-MCNC: 51 MG/DL (ref 6–23)
CALCIUM SERPL-MCNC: 9.5 MG/DL (ref 8.6–10.3)
CHLORIDE SERPL-SCNC: 103 MMOL/L (ref 98–107)
CO2 SERPL-SCNC: 25 MMOL/L (ref 21–32)
CREAT SERPL-MCNC: 2.45 MG/DL (ref 0.5–1.05)
GFR SERPL CREATININE-BSD FRML MDRD: 22 ML/MIN/1.73M*2
GLUCOSE SERPL-MCNC: 151 MG/DL (ref 74–99)
IRON SATN MFR SERPL: 31 % (ref 25–45)
IRON SERPL-MCNC: 133 UG/DL (ref 35–150)
PHOSPHATE SERPL-MCNC: 5 MG/DL (ref 2.5–4.9)
POTASSIUM SERPL-SCNC: 5.2 MMOL/L (ref 3.5–5.3)
SODIUM SERPL-SCNC: 139 MMOL/L (ref 136–145)
TIBC SERPL-MCNC: 426 UG/DL (ref 240–445)
UIBC SERPL-MCNC: 293 UG/DL (ref 110–370)
VIT B12 SERPL-MCNC: 821 PG/ML (ref 211–911)

## 2023-12-07 PROCEDURE — 82607 VITAMIN B-12: CPT

## 2023-12-07 PROCEDURE — 82533 TOTAL CORTISOL: CPT

## 2023-12-07 PROCEDURE — 36415 COLL VENOUS BLD VENIPUNCTURE: CPT

## 2023-12-07 PROCEDURE — 80069 RENAL FUNCTION PANEL: CPT

## 2023-12-07 PROCEDURE — 83540 ASSAY OF IRON: CPT

## 2023-12-07 PROCEDURE — 83550 IRON BINDING TEST: CPT

## 2023-12-08 LAB — CORTIS SERPL-MCNC: 8.7 UG/DL (ref 2.5–20)

## 2023-12-11 ENCOUNTER — OFFICE VISIT (OUTPATIENT)
Dept: ORTHOPEDIC SURGERY | Age: 63
End: 2023-12-11
Payer: COMMERCIAL

## 2023-12-11 VITALS
OXYGEN SATURATION: 99 % | BODY MASS INDEX: 20.65 KG/M2 | SYSTOLIC BLOOD PRESSURE: 118 MMHG | HEIGHT: 66 IN | DIASTOLIC BLOOD PRESSURE: 77 MMHG | TEMPERATURE: 97 F | WEIGHT: 128.5 LBS

## 2023-12-11 DIAGNOSIS — G89.29 CHRONIC RIGHT SHOULDER PAIN: Primary | ICD-10-CM

## 2023-12-11 DIAGNOSIS — M75.101 TEAR OF RIGHT ROTATOR CUFF, UNSPECIFIED TEAR EXTENT, UNSPECIFIED WHETHER TRAUMATIC: ICD-10-CM

## 2023-12-11 DIAGNOSIS — M25.511 CHRONIC RIGHT SHOULDER PAIN: Primary | ICD-10-CM

## 2023-12-11 PROCEDURE — 3074F SYST BP LT 130 MM HG: CPT | Performed by: STUDENT IN AN ORGANIZED HEALTH CARE EDUCATION/TRAINING PROGRAM

## 2023-12-11 PROCEDURE — 20610 DRAIN/INJ JOINT/BURSA W/O US: CPT | Performed by: STUDENT IN AN ORGANIZED HEALTH CARE EDUCATION/TRAINING PROGRAM

## 2023-12-11 PROCEDURE — 99204 OFFICE O/P NEW MOD 45 MIN: CPT | Performed by: STUDENT IN AN ORGANIZED HEALTH CARE EDUCATION/TRAINING PROGRAM

## 2023-12-11 PROCEDURE — 3078F DIAST BP <80 MM HG: CPT | Performed by: STUDENT IN AN ORGANIZED HEALTH CARE EDUCATION/TRAINING PROGRAM

## 2023-12-11 RX ORDER — LIDOCAINE HYDROCHLORIDE 10 MG/ML
5 INJECTION, SOLUTION INFILTRATION; PERINEURAL ONCE
Status: COMPLETED | OUTPATIENT
Start: 2023-12-11 | End: 2023-12-11

## 2023-12-11 RX ORDER — TRIAMCINOLONE ACETONIDE 40 MG/ML
40 INJECTION, SUSPENSION INTRA-ARTICULAR; INTRAMUSCULAR ONCE
Status: COMPLETED | OUTPATIENT
Start: 2023-12-11 | End: 2023-12-11

## 2023-12-11 RX ADMIN — TRIAMCINOLONE ACETONIDE 40 MG: 40 INJECTION, SUSPENSION INTRA-ARTICULAR; INTRAMUSCULAR at 15:02

## 2023-12-11 RX ADMIN — LIDOCAINE HYDROCHLORIDE 5 ML: 10 INJECTION, SOLUTION INFILTRATION; PERINEURAL at 15:01

## 2023-12-11 ASSESSMENT — ENCOUNTER SYMPTOMS
EYES NEGATIVE: 1
GASTROINTESTINAL NEGATIVE: 1
RESPIRATORY NEGATIVE: 1
ALLERGIC/IMMUNOLOGIC NEGATIVE: 1

## 2023-12-11 NOTE — PROGRESS NOTES
Orthopedic Surgery and Sports Medicine    Subjective:      Patient ID: Tri Kinsey is a 58 y.o. female who presents today for:  Chief Complaint   Patient presents with    New Patient     Here for exam for RT Shoulder pain, says she accidentally fell down her staircase back in 7/23, x-rays today       HPI  This is a 30-year-old female presents today for evaluation of her right shoulder pain. This has been present since a fall back in July. She states that she fell down the staircase at home. She blacked out when it happened. She woke up at the bottom of the stairs. She did not have any shoulder pain prior to that time. Since then she has had pain over the lateral aspect of the shoulder and upper arm as well as some radiation into the lateral side of the neck. It is worse with certain movements as well as trying to reach overhead. She has not had any treatment for her shoulder. She has stage III chronic kidney disease and therefore is relatively low demand. She gets tired from her kidney disease and therefore is mostly sedentary. She uses a cane for ambulation. Previous treatment: None  NSAIDs: No, she has chronic kidney disease  Physical therapy: No    Hand Dominance: right  Occupation: n/a  Workers Compensation:   Have you missed work for this issue? No  Is this issue being addressed under a worker's compensation claim?  No    Past Medical History:   Diagnosis Date    Allergic rhinitis     CRF (chronic renal failure) 7/2013    Crohn's disease ([address])     Depression     Histoplasmosis     bone marrow    Hyperlipidemia     Hypertension     Hypothyroidism 10/2020    Pancreatitis chronic     Renal failure, chronic     Type II or unspecified type diabetes mellitus without mention of complication, not stated as uncontrolled       Past Surgical History:   Procedure Laterality Date    CARPAL TUNNEL RELEASE  11/02/2017    DR SARAVIA,  RT    CHOLECYSTECTOMY      TUBAL LIGATION       Social History

## 2024-01-10 ENCOUNTER — APPOINTMENT (OUTPATIENT)
Dept: DIALYSIS | Facility: HOSPITAL | Age: 64
End: 2024-01-10
Payer: COMMERCIAL

## 2024-01-17 DIAGNOSIS — E11.65 TYPE 2 DIABETES MELLITUS WITH HYPERGLYCEMIA, WITH LONG-TERM CURRENT USE OF INSULIN (MULTI): Primary | ICD-10-CM

## 2024-01-17 DIAGNOSIS — Z79.4 TYPE 2 DIABETES MELLITUS WITH HYPERGLYCEMIA, WITH LONG-TERM CURRENT USE OF INSULIN (MULTI): Primary | ICD-10-CM

## 2024-01-17 RX ORDER — INSULIN LISPRO 100 [IU]/ML
INJECTION, SOLUTION INTRAVENOUS; SUBCUTANEOUS
Qty: 45 ML | Refills: 0 | Status: SHIPPED | OUTPATIENT
Start: 2024-01-17 | End: 2024-04-08

## 2024-01-18 NOTE — PROGRESS NOTES
FUV for diabetes. LV with me 07/2023.    An interactive audio and video telecommunication system which permits real time communications between the patient (at the originating site) and provider (at the distant site) was utilized to provide this telehealth service.    Verbal consent was requested and obtained from Agnes M Reyes on 01/22/24 4:32 PM for a telehealth visit.      History of Present Illness   63 y.o. female with hx of type 2 diabetes, hypothyroidism, HTN, HLD, CKD stage 3, Crohn's disease, recurrent pancreatitis.     HbA1c: 6.8% (6/7/2023), 6.3% (03/03/2023), 8.1% (11/21/2022)  Current regimen: Basaglar 25-30 units BID, Humalog 10-10-25 units TIDAC+SS#2  Complications: retinopathy, nephropathy, neuropathy  Comorbidities: HTN, HLD, CKD, recurrent pancreatitis, recurrent UTI     SMBG: Mandie 2      Hypoglycemia:      Diet: low-carb diet  Breakfast and lunch as small meals.  Dinner is her main meal.    Today:  -blood sugars have been running very high recently    ROS  General: no fever or chills  CV: no chest pain   Respiratory: no shortness of breath  MSK: no lower extremity edema  Neuro: no headache or dizziness  See HPI for Endocrine ROS    Past Medical History:   Diagnosis Date    Combined forms of age-related cataract, bilateral 08/01/2019    Combined form of senile cataract of both eyes    Combined forms of age-related cataract, right eye 08/15/2019    Combined form of senile cataract of right eye    Histoplasmosis, unspecified 08/19/2022    Disseminated histoplasmosis    Other conditions influencing health status 01/28/2022    Uncontrolled insulin dependent diabetes mellitus    Other muscle spasm 10/14/2015    Muscle spasm    Personal history of other diseases of the digestive system 04/03/2014    History of constipation    Personal history of other diseases of the digestive system 10/20/2015    History of acute pancreatitis    Personal history of other specified conditions 04/12/2016    History of  dizziness    Personal history of other specified conditions 10/20/2015    History of abnormal weight loss    Personal history of urinary (tract) infections 2019    History of urinary tract infection    Presence of intraocular lens 2019    Pseudophakia of left eye    Unspecified pre-eclampsia, unspecified trimester     Toxemia of pregnancy       Past Surgical History:   Procedure Laterality Date    CHOLECYSTECTOMY  2015    Cholecystectomy    COLECTOMY  2015    Partial Colectomy    TUBAL LIGATION  2015    Tubal Ligation       Social History     Socioeconomic History    Marital status:      Spouse name: Not on file    Number of children: Not on file    Years of education: Not on file    Highest education level: Not on file   Occupational History    Not on file   Tobacco Use    Smoking status: Former     Types: Cigarettes     Quit date:      Years since quittin.0     Passive exposure: Past    Smokeless tobacco: Never   Vaping Use    Vaping Use: Never used   Substance and Sexual Activity    Alcohol use: Never    Drug use: Never    Sexual activity: Not on file   Other Topics Concern    Not on file   Social History Narrative    Not on file     Social Determinants of Health     Financial Resource Strain: Not on file   Food Insecurity: No Food Insecurity (10/31/2023)    Hunger Vital Sign     Worried About Running Out of Food in the Last Year: Never true     Ran Out of Food in the Last Year: Never true   Transportation Needs: Not on file   Physical Activity: Not on file   Stress: Not on file   Social Connections: Not on file   Intimate Partner Violence: Not on file   Housing Stability: Not on file       Physical Exam   vitals were not taken for this visit.   General: not in acute distress  Neuro: alert and oriented x 3    Current Outpatient Medications   Medication Sig Dispense Refill    acetaminophen (Tylenol) 325 mg tablet Take 2 tablets (650 mg) by mouth every 4 hours if  needed.      blood sugar diagnostic, drum (Accu-Chek Compact Plus Test) strip test blood glucose 3 x/ day      cholecalciferol (Vitamin D-3) 50 mcg (2,000 unit) capsule Take 1 capsule (50 mcg) by mouth once daily.      diphenoxylate-atropine (Lomotil) 2.5-0.025 mg tablet Take 2 tablets by mouth 4 times a day. TAKE 2 TABLETS 4 TIMES DAILY AS NEEDED FOR DIARRHEA.      fenofibrate (Tricor) 145 mg tablet Take 1 tablet (145 mg) by mouth once daily.      fluconazole (Diflucan) 200 mg tablet Take 2 tablets (400 mg) by mouth once daily.      gabapentin (Neurontin) 300 mg capsule Take 1 capsule (300 mg) by mouth once daily at bedtime.      icosapent ethyL (Vascepa) 1 gram capsule Take 2 capsules (2 g) by mouth 2 times a day.      insulin lispro (HumaLOG KwikPen Insulin) 100 unit/mL injection INJECT 10 units before breakfast, 10 units before lunch, and 25 units before dinner. Use sliding scale as directed. MDD 50 units. 45 mL 0    levothyroxine (Synthroid, Levoxyl) 50 mcg tablet       magnesium chloride (Slow-Mag) 71.5 mg tablet,delayed release (DR/EC) Take 1 tablet (71.5 mg) by mouth 2 times a day.      multivitamin with minerals (multivit-min-iron fum-folic ac) tablet Take 1 tablet by mouth once daily.      omeprazole (PriLOSEC) 40 mg DR capsule       predniSONE (Deltasone) 5 mg tablet Take 2 tablets (10 mg) by mouth once daily. 180 tablet 3    rosuvastatin (Crestor) 20 mg tablet Take 1 tablet (20 mg) by mouth once daily.      sertraline (Zoloft) 100 mg tablet Take by mouth once daily.      sodium bicarbonate 650 mg tablet Take 2 tablets (1,300 mg) by mouth every 12 hours.      traZODone (Desyrel) 50 mg tablet Take 1 tablet (50 mg) by mouth once daily at bedtime.       No current facility-administered medications for this visit.         Assessment and Plan  63 y.o. female with hx of type 2 diabetes, hypothyroidism, HTN, HLD, CKD stage 3, Crohn's disease, recurrent pancreatitis, here for follow-up of diabetes.     1. Type 2  diabetes mellitus  2, Hypertriglyceridemia  HbA1c: 6.8% (6/7/2023), 6.3% (3/3/2023), 8.1% (11/21/2022)  Current regimen: Basaglar 25-30 units BID, Humalog 10-10-25 units TIDAC+SS#2  Eye exam: +  (01/2022)  Urine microalbumin: 380 micrograms/mg (11/2023) ; managed by nephrology  Lipids: HDL 30, , non-, LDL NA (11/2022) on rosuvastatin 20 mg QDAY, fenofibrate 145 mg QDAY  *Avoid GLP-1 RA due to hx of recurrent pancreatitis     Mandie download reviewed.   Hyperglycemic after lunch and dinner.  Prednisone 10 mg QDAY was started by her GI provider for possible AI.  This is likely the cause of her hyperglycemia.  Will reduce prednisone to 5 mg QDAY.  Until prednisone 10 mg is out of her system (few days), will have her increase prednisone for lunch and dinner.  Advised patient to notify me in 3 days regarding her blood glucose.  She will likely need to reduce Humalog back to original dose after the effects of prednisone 10 mg has diminished.    PLAN:  -decrease prednisone to 5 mg QDAY  -continue Basaglar 25-30 units BID  -increase Humalog to 10-15-30 units TIDAC+SS#2  -check blood sugars before every meal and bedtime (uses Mandie 2 with reader)  -continue Vascepa 2g BID  -discuss screening and labs at next visit     2. Hypothyroidism  Current regimen: levothyroxine 50 mcg/day     CT chest: 1.1 cm right thyroid nodule.  Labs from 2/16/23  TSH 5.42     TSH previously normal.     PLAN:  -check TSH at next visit  -continue levothyroxine 50 mcg/day     3. Secondary adrenal insufficiency  4. Hx of chronic steroid use for Crohn's disease  Reported falls and a syncopal episode at .  She also reported lower baseline BP, lightheadedness, and dizziness.  Advised to have labs done to assess for AI but she never got this done.  Cortisol was checked by Dr. Hussein (GI) on 12/7/2023.  Random cortisol (4 pm): 8.7     She has a hx of long-term steroid use for Crohn's disease (prednisone 5 mg QDAY x > 15 years) and apparently  had stopped taking prednisone in July 2023..  When Dr. Hussein reached out to me in October 2023, I recommended resuming prednisone 5 mg QDAY.  It appears that she was resumed on prednisone 10 mg QDAY.   She has not had any falls or syncopal episodes since resuming prednisone.  However, now, the prednisone is causing hyperglycemia.  Will reduce to a physiologic dose.    PLAN:  -decrease prednisone to 5 mg QDAY     Follow-up with me in 2 months  Uses [facility].

## 2024-01-19 DIAGNOSIS — F51.01 PRIMARY INSOMNIA: ICD-10-CM

## 2024-01-19 DIAGNOSIS — E03.9 HYPOTHYROIDISM, UNSPECIFIED TYPE: ICD-10-CM

## 2024-01-19 DIAGNOSIS — F32.89 OTHER DEPRESSION: ICD-10-CM

## 2024-01-19 DIAGNOSIS — E78.2 MIXED HYPERLIPIDEMIA: ICD-10-CM

## 2024-01-19 NOTE — TELEPHONE ENCOUNTER
Please approve or deny this request.    Rx requested:  Requested Prescriptions     Pending Prescriptions Disp Refills    sertraline (ZOLOFT) 100 MG tablet 90 tablet 1     Sig: Take 1 tablet by mouth daily    fenofibrate (TRICOR) 145 MG tablet 90 tablet 1     Sig: Take 1 tablet by mouth daily    traZODone (DESYREL) 100 MG tablet 90 tablet 1     Sig: Take 1 tablet by mouth    levothyroxine (SYNTHROID) 50 MCG tablet 90 tablet 1     Sig: Take 1 tablet by mouth every morning    rosuvastatin (CRESTOR) 20 MG tablet 90 tablet 1     Sig: Take 1 tablet by mouth every morning    omeprazole (PRILOSEC) 40 MG delayed release capsule 90 capsule 1     Sig: take 1 capsule by mouth once daily         Last Office Visit:   10/20/2023      Next Visit Date:  Future Appointments   Date Time Provider Department Center   2/19/2024  1:00 PM Марина Watson DO [facility] Tyesha Eaton

## 2024-01-22 ENCOUNTER — TELEMEDICINE (OUTPATIENT)
Dept: ENDOCRINOLOGY | Facility: CLINIC | Age: 64
End: 2024-01-22
Payer: COMMERCIAL

## 2024-01-22 DIAGNOSIS — Z86.19 HISTORY OF HISTOPLASMOSIS: Primary | ICD-10-CM

## 2024-01-22 DIAGNOSIS — E11.9 TYPE 2 DIABETES MELLITUS WITHOUT COMPLICATION, WITH LONG-TERM CURRENT USE OF INSULIN (MULTI): Primary | ICD-10-CM

## 2024-01-22 DIAGNOSIS — Z79.4 TYPE 2 DIABETES MELLITUS WITHOUT COMPLICATION, WITH LONG-TERM CURRENT USE OF INSULIN (MULTI): Primary | ICD-10-CM

## 2024-01-22 PROCEDURE — 99215 OFFICE O/P EST HI 40 MIN: CPT | Performed by: STUDENT IN AN ORGANIZED HEALTH CARE EDUCATION/TRAINING PROGRAM

## 2024-01-22 PROCEDURE — 95251 CONT GLUC MNTR ANALYSIS I&R: CPT | Performed by: STUDENT IN AN ORGANIZED HEALTH CARE EDUCATION/TRAINING PROGRAM

## 2024-01-22 RX ORDER — LEVOTHYROXINE SODIUM 0.05 MG/1
50 TABLET ORAL EVERY MORNING
Qty: 90 TABLET | Refills: 0 | Status: SHIPPED | OUTPATIENT
Start: 2024-01-22

## 2024-01-22 RX ORDER — TRAZODONE HYDROCHLORIDE 100 MG/1
100 TABLET ORAL NIGHTLY
Qty: 90 TABLET | Refills: 0 | Status: SHIPPED | OUTPATIENT
Start: 2024-01-22

## 2024-01-22 RX ORDER — ROSUVASTATIN CALCIUM 20 MG/1
20 TABLET, COATED ORAL EVERY MORNING
Qty: 90 TABLET | Refills: 0 | Status: SHIPPED | OUTPATIENT
Start: 2024-01-22

## 2024-01-22 RX ORDER — FLUCONAZOLE 200 MG/1
400 TABLET ORAL DAILY
Qty: 180 TABLET | OUTPATIENT
Start: 2024-01-22

## 2024-01-22 RX ORDER — FENOFIBRATE 145 MG/1
145 TABLET, COATED ORAL DAILY
Qty: 90 TABLET | Refills: 0 | Status: SHIPPED | OUTPATIENT
Start: 2024-01-22

## 2024-01-22 RX ORDER — FLUCONAZOLE 200 MG/1
200 TABLET ORAL DAILY
Qty: 90 TABLET | Refills: 3 | Status: SHIPPED | OUTPATIENT
Start: 2024-01-22 | End: 2024-02-12 | Stop reason: SDUPTHER

## 2024-01-22 RX ORDER — OMEPRAZOLE 40 MG/1
CAPSULE, DELAYED RELEASE ORAL
Qty: 90 CAPSULE | Refills: 0 | Status: SHIPPED | OUTPATIENT
Start: 2024-01-22

## 2024-01-22 RX ORDER — SERTRALINE HYDROCHLORIDE 100 MG/1
100 TABLET, FILM COATED ORAL DAILY
Qty: 90 TABLET | Refills: 0 | Status: SHIPPED | OUTPATIENT
Start: 2024-01-22 | End: 2024-04-21

## 2024-01-22 NOTE — PROGRESS NOTES
Fluconazole was reordered at patient request to go to [facility]. Dose will be decreased to 200 mg due to renal insufficiency.  Effective dose will be 400mg.  Her next appointment will be virtual due to multiple missed appointments in past. Has transportation issues.

## 2024-01-23 NOTE — TELEPHONE ENCOUNTER
Patient calling in, received scripts, however, no refills on any.  Patient expecting 2 refills.    LOV 10/20/23  No future office visits scheduled and patient did not want to schedule. She will call in at a later date.

## 2024-02-02 ENCOUNTER — TELEMEDICINE (OUTPATIENT)
Dept: INFECTIOUS DISEASES | Facility: CLINIC | Age: 64
End: 2024-02-02
Payer: COMMERCIAL

## 2024-02-02 DIAGNOSIS — B39.9 DISSEMINATED HISTOPLASMOSIS: Primary | Chronic | ICD-10-CM

## 2024-02-02 PROCEDURE — 1159F MED LIST DOCD IN RCRD: CPT | Performed by: INTERNAL MEDICINE

## 2024-02-02 PROCEDURE — 1036F TOBACCO NON-USER: CPT | Performed by: INTERNAL MEDICINE

## 2024-02-02 PROCEDURE — 99213 OFFICE O/P EST LOW 20 MIN: CPT | Performed by: INTERNAL MEDICINE

## 2024-02-02 PROCEDURE — 1126F AMNT PAIN NOTED NONE PRSNT: CPT | Performed by: INTERNAL MEDICINE

## 2024-02-02 NOTE — PROGRESS NOTES
"Infectious Diseases Clinic Follow-up:    Reason for Visit: Follow-up for disseminated hisptoplasmosis prophylaxis    History of Current Issue  Agnes M Reyes is a 64 y.o. year old female last seen by me in March of 2023.  Multiple rescheduled appointments due to illness.  States that she was ill from September to October 2023.    She was in [place] and ran out of her Prednisone and did not call for refills.  She had been off from May through mid-June until she returned to [facility].     Restarted Pred at 10mg daily and her \"sugars went crazy\" but now down to 5 mg.    States that she never missed her Fluconazole \"this time\".    Overall, feeling much better.    PAST MEDICAL HISTORY:  Past Medical History:   Diagnosis Date    Combined forms of age-related cataract, bilateral 08/01/2019    Combined form of senile cataract of both eyes    Combined forms of age-related cataract, right eye 08/15/2019    Combined form of senile cataract of right eye    Histoplasmosis, unspecified 08/19/2022    Disseminated histoplasmosis    Other conditions influencing health status 01/28/2022    Uncontrolled insulin dependent diabetes mellitus    Other muscle spasm 10/14/2015    Muscle spasm    Personal history of other diseases of the digestive system 04/03/2014    History of constipation    Personal history of other diseases of the digestive system 10/20/2015    History of acute pancreatitis    Personal history of other specified conditions 04/12/2016    History of dizziness    Personal history of other specified conditions 10/20/2015    History of abnormal weight loss    Personal history of urinary (tract) infections 08/16/2019    History of urinary tract infection    Presence of intraocular lens 08/08/2019    Pseudophakia of left eye    Unspecified pre-eclampsia, unspecified trimester     Toxemia of pregnancy       PAST SURGICAL HISTORY:  Past Surgical History:   Procedure Laterality Date    CHOLECYSTECTOMY  04/09/2015    Cholecystectomy    " COLECTOMY  04/09/2015    Partial Colectomy    TUBAL LIGATION  04/09/2015    Tubal Ligation       ALLERGIES:    Allergies   Allergen Reactions    Dye Anaphylaxis     IV Contrast Dye    Iodides Anaphylaxis    Iodinated Contrast Media Anaphylaxis     STOPPED HEART    Toradol [Ketorolac] Shortness of breath and Swelling       MEDICATIONS:      Current Outpatient Medications:     acetaminophen (Tylenol) 325 mg tablet, Take 2 tablets (650 mg) by mouth every 4 hours if needed., Disp: , Rfl:     cholecalciferol (Vitamin D-3) 50 mcg (2,000 unit) capsule, Take 1 capsule (50 mcg) by mouth once daily., Disp: , Rfl:     diphenoxylate-atropine (Lomotil) 2.5-0.025 mg tablet, Take 2 tablets by mouth 4 times a day. TAKE 2 TABLETS 4 TIMES DAILY AS NEEDED FOR DIARRHEA., Disp: , Rfl:     fenofibrate (Tricor) 145 mg tablet, Take 1 tablet (145 mg) by mouth once daily., Disp: , Rfl:     flash glucose sensor (FREESTYLE ALETHA 2 SENSOR [facility] [place] [place]), , Disp: , Rfl:     fluconazole (Diflucan) 200 mg tablet, Take 1 tablet (200 mg) by mouth once daily., Disp: 90 tablet, Rfl: 3    gabapentin (Neurontin) 300 mg capsule, Take 1 capsule (300 mg) by mouth once daily at bedtime., Disp: , Rfl:     icosapent ethyL (Vascepa) 1 gram capsule, Take 2 capsules (2 g) by mouth 2 times a day., Disp: , Rfl:     insulin lispro (HumaLOG KwikPen Insulin) 100 unit/mL injection, INJECT 10 units before breakfast, 10 units before lunch, and 25 units before dinner. Use sliding scale as directed. MDD 50 units., Disp: 45 mL, Rfl: 0    levothyroxine (Synthroid, Levoxyl) 50 mcg tablet, , Disp: , Rfl:     magnesium chloride (Slow-Mag) 71.5 mg tablet,delayed release (DR/EC), Take 1 tablet (71.5 mg) by mouth 2 times a day., Disp: , Rfl:     multivitamin with minerals (multivit-min-iron fum-folic ac) tablet, Take 1 tablet by mouth once daily., Disp: , Rfl:     omeprazole (PriLOSEC) 40 mg DR capsule, , Disp: , Rfl:     predniSONE (Deltasone) 5 mg tablet, Take 2 tablets (10 mg) by mouth  once daily., Disp: 180 tablet, Rfl: 3    rosuvastatin (Crestor) 20 mg tablet, Take 1 tablet (20 mg) by mouth once daily., Disp: , Rfl:     sertraline (Zoloft) 100 mg tablet, Take by mouth once daily., Disp: , Rfl:     sodium bicarbonate 650 mg tablet, Take 2 tablets (1,300 mg) by mouth every 12 hours., Disp: , Rfl:     traZODone (Desyrel) 50 mg tablet, Take 1 tablet (50 mg) by mouth once daily at bedtime., Disp: , Rfl:     REVIEW OF SYSTEMS:    All pertinent positives and negatives as documented in HPI.     PHYSICAL EXAMINATION:     Visit Vitals  Smoking Status Former        EXAM:   Limited exam due to video visit.    Looks well.  Coloring good.  Affect is spirited, laughing.  Breathing easily and speaking full sentences    DATA:     12/7/23:  Cr- 2.45    ASSESSMENT / RECOMMENDATIONS:  63yo woman with Crohn's disease maintained on chronic Prednisone therapy, DM2, and ESRD.  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  She remains on chronic suppressive fluconazole due to continued need for steroids.  Has a h/o relapse when discontinued prophylaxis while still on Humira.    She continues to tolerate Fluconazole without issue.    She has not had Histoplasma monitoring labs since 11/2022.    I will reorder those labs today.  She may wait until her next scheduled lab draw to have them collected.  Fluconazole will be refilled when she fills her last prescription.   I will see her back in 6 months; sooner if need arises.    Problem List Items Addressed This Visit       Disseminated histoplasmosis - Primary    Relevant Orders    Histoplasma antigen, serum    Histoplasma Antibodies          I spent 20 minutes in the professional and overall care of this patient.      Simin Silverio MD

## 2024-02-12 ENCOUNTER — APPOINTMENT (OUTPATIENT)
Dept: ENDOCRINOLOGY | Facility: CLINIC | Age: 64
End: 2024-02-12
Payer: COMMERCIAL

## 2024-02-12 DIAGNOSIS — Z79.4 TYPE 2 DIABETES MELLITUS WITHOUT COMPLICATION, WITH LONG-TERM CURRENT USE OF INSULIN (MULTI): Primary | ICD-10-CM

## 2024-02-12 DIAGNOSIS — E11.9 TYPE 2 DIABETES MELLITUS WITHOUT COMPLICATION, WITH LONG-TERM CURRENT USE OF INSULIN (MULTI): Primary | ICD-10-CM

## 2024-02-12 RX ORDER — BLOOD-GLUCOSE SENSOR
EACH MISCELLANEOUS
Qty: 6 EACH | Refills: 2 | Status: SHIPPED | OUTPATIENT
Start: 2024-02-12

## 2024-02-14 ENCOUNTER — HOSPITAL ENCOUNTER (OUTPATIENT)
Dept: DIALYSIS | Facility: HOSPITAL | Age: 64
Setting detail: DIALYSIS SERIES
Discharge: HOME | End: 2024-02-14
Payer: COMMERCIAL

## 2024-02-14 VITALS
SYSTOLIC BLOOD PRESSURE: 146 MMHG | DIASTOLIC BLOOD PRESSURE: 84 MMHG | WEIGHT: 137.57 LBS | BODY MASS INDEX: 22.89 KG/M2 | HEART RATE: 88 BPM

## 2024-02-14 DIAGNOSIS — N18.9 HISTORY OF ANEMIA DUE TO CHRONIC KIDNEY DISEASE: ICD-10-CM

## 2024-02-14 DIAGNOSIS — Z86.2 HISTORY OF ANEMIA DUE TO CHRONIC KIDNEY DISEASE: ICD-10-CM

## 2024-02-14 DIAGNOSIS — E20.9 HYPOPARATHYROIDISM, UNSPECIFIED HYPOPARATHYROIDISM TYPE (MULTI): Primary | ICD-10-CM

## 2024-02-14 LAB
ALBUMIN SERPL BCP-MCNC: 3.6 G/DL (ref 3.4–5)
ANION GAP SERPL CALC-SCNC: 15 MMOL/L (ref 10–20)
BUN SERPL-MCNC: 33 MG/DL (ref 6–23)
CALCIUM SERPL-MCNC: 9.2 MG/DL (ref 8.6–10.6)
CHLORIDE SERPL-SCNC: 104 MMOL/L (ref 98–107)
CO2 SERPL-SCNC: 24 MMOL/L (ref 21–32)
CREAT SERPL-MCNC: 2.28 MG/DL (ref 0.5–1.05)
EGFRCR SERPLBLD CKD-EPI 2021: 23 ML/MIN/1.73M*2
ERYTHROCYTE [DISTWIDTH] IN BLOOD BY AUTOMATED COUNT: 13.9 % (ref 11.5–14.5)
FERRITIN SERPL-MCNC: 917 NG/ML (ref 8–150)
GLUCOSE SERPL-MCNC: 161 MG/DL (ref 74–99)
HCT VFR BLD AUTO: 29.4 % (ref 36–46)
HGB BLD-MCNC: 9.5 G/DL (ref 12–16)
IRON SATN MFR SERPL: 17 % (ref 25–45)
IRON SERPL-MCNC: 74 UG/DL (ref 35–150)
MCH RBC QN AUTO: 30.7 PG (ref 26–34)
MCHC RBC AUTO-ENTMCNC: 32.3 G/DL (ref 32–36)
MCV RBC AUTO: 95 FL (ref 80–100)
NRBC BLD-RTO: 0 /100 WBCS (ref 0–0)
PHOSPHATE SERPL-MCNC: 4 MG/DL (ref 2.5–4.9)
PLATELET # BLD AUTO: 281 X10*3/UL (ref 150–450)
POTASSIUM SERPL-SCNC: 4.6 MMOL/L (ref 3.5–5.3)
PTH-INTACT SERPL-MCNC: 11.1 PG/ML (ref 18.5–88)
RBC # BLD AUTO: 3.09 X10*6/UL (ref 4–5.2)
SODIUM SERPL-SCNC: 138 MMOL/L (ref 136–145)
TIBC SERPL-MCNC: 431 UG/DL (ref 240–445)
TRANSFERRIN SERPL-MCNC: 326 MG/DL (ref 200–360)
UIBC SERPL-MCNC: 357 UG/DL (ref 110–370)
WBC # BLD AUTO: 9.4 X10*3/UL (ref 4.4–11.3)

## 2024-02-14 PROCEDURE — 36415 COLL VENOUS BLD VENIPUNCTURE: CPT | Performed by: INTERNAL MEDICINE

## 2024-02-14 PROCEDURE — 80069 RENAL FUNCTION PANEL: CPT | Performed by: INTERNAL MEDICINE

## 2024-02-14 PROCEDURE — 96375 TX/PRO/DX INJ NEW DRUG ADDON: CPT

## 2024-02-14 PROCEDURE — 82728 ASSAY OF FERRITIN: CPT | Performed by: INTERNAL MEDICINE

## 2024-02-14 PROCEDURE — 85027 COMPLETE CBC AUTOMATED: CPT | Performed by: INTERNAL MEDICINE

## 2024-02-14 PROCEDURE — 84466 ASSAY OF TRANSFERRIN: CPT | Performed by: INTERNAL MEDICINE

## 2024-02-14 PROCEDURE — 2500000004 HC RX 250 GENERAL PHARMACY W/ HCPCS (ALT 636 FOR OP/ED): Mod: JZ,JG | Performed by: INTERNAL MEDICINE

## 2024-02-14 PROCEDURE — 83550 IRON BINDING TEST: CPT | Mod: 59 | Performed by: INTERNAL MEDICINE

## 2024-02-14 PROCEDURE — 83970 ASSAY OF PARATHORMONE: CPT | Performed by: INTERNAL MEDICINE

## 2024-02-14 PROCEDURE — 36415 COLL VENOUS BLD VENIPUNCTURE: CPT

## 2024-02-14 RX ORDER — DIPHENHYDRAMINE HYDROCHLORIDE 50 MG/ML
50 INJECTION INTRAMUSCULAR; INTRAVENOUS AS NEEDED
Status: CANCELLED | OUTPATIENT
Start: 2024-02-22

## 2024-02-14 RX ADMIN — EPOETIN ALFA 60000 UNITS: 20000 SOLUTION INTRAVENOUS; SUBCUTANEOUS at 13:45

## 2024-02-14 ASSESSMENT — PAIN SCALES - GENERAL: PAINLEVEL: 0-NO PAIN

## 2024-02-14 NOTE — PROGRESS NOTES
..How is your energy? Low  How is your appetite? Low one meal daily eat 100% of meal sometimes twice daily  Any nausea and vomiting after eating? no  Any diarrhea or constipation? Diarrhea at times    Any SOB? GOOD  Any Edema in lower extremities? NO  Any questions regarding your Kidney disease?NO  DO you have a Dialysis accesses? NO

## 2024-02-21 NOTE — PROGRESS NOTES
FUV for diabetes. LV with me 01/2023.    History of Present Illness   64 y.o. female with hx of type 2 diabetes, hypothyroidism, HTN, HLD, CKD stage 3, Crohn's disease, recurrent pancreatitis.     HbA1c: 6.8% (6/7/2023), 6.3% (03/03/2023), 8.1% (11/21/2022)  Current regimen: Basaglar 25-30 units BID, Humalog 10-15-30 units TIDAC+SS#2  Complications: retinopathy, nephropathy, neuropathy  Comorbidities: HTN, HLD, CKD, recurrent pancreatitis, recurrent UTI     SMBG: Mandie 2      Hypoglycemia: yes, 60s     Diet: low-carb diet  Breakfast and lunch as small meals.  Dinner is her main meal.    Today:  -blood sugars still running high    ROS  General: no fever or chills  CV: no chest pain   Respiratory: no shortness of breath  MSK: no lower extremity edema  Neuro: no headache or dizziness  See HPI for Endocrine ROS    Past Medical History:   Diagnosis Date    Combined forms of age-related cataract, bilateral 08/01/2019    Combined form of senile cataract of both eyes    Combined forms of age-related cataract, right eye 08/15/2019    Combined form of senile cataract of right eye    Histoplasmosis, unspecified 08/19/2022    Disseminated histoplasmosis    Other conditions influencing health status 01/28/2022    Uncontrolled insulin dependent diabetes mellitus    Other muscle spasm 10/14/2015    Muscle spasm    Personal history of other diseases of the digestive system 04/03/2014    History of constipation    Personal history of other diseases of the digestive system 10/20/2015    History of acute pancreatitis    Personal history of other specified conditions 04/12/2016    History of dizziness    Personal history of other specified conditions 10/20/2015    History of abnormal weight loss    Personal history of urinary (tract) infections 08/16/2019    History of urinary tract infection    Presence of intraocular lens 08/08/2019    Pseudophakia of left eye    Unspecified pre-eclampsia, unspecified trimester     Toxemia of  "pregnancy       Past Surgical History:   Procedure Laterality Date    CHOLECYSTECTOMY  2015    Cholecystectomy    COLECTOMY  2015    Partial Colectomy    TUBAL LIGATION  2015    Tubal Ligation       Social History     Socioeconomic History    Marital status:      Spouse name: Not on file    Number of children: Not on file    Years of education: Not on file    Highest education level: Not on file   Occupational History    Not on file   Tobacco Use    Smoking status: Former     Types: Cigarettes     Quit date:      Years since quittin.1     Passive exposure: Past    Smokeless tobacco: Never   Vaping Use    Vaping Use: Never used   Substance and Sexual Activity    Alcohol use: Never    Drug use: Never    Sexual activity: Not on file   Other Topics Concern    Not on file   Social History Narrative    Not on file     Social Determinants of Health     Financial Resource Strain: Not on file   Food Insecurity: No Food Insecurity (10/31/2023)    Hunger Vital Sign     Worried About Running Out of Food in the Last Year: Never true     Ran Out of Food in the Last Year: Never true   Transportation Needs: Not on file   Physical Activity: Not on file   Stress: Not on file   Social Connections: Not on file   Intimate Partner Violence: Not on file   Housing Stability: Not on file       Physical Exam   height is 1.676 m (5' 6\") and weight is 63 kg (139 lb). Her blood pressure is 144/87 and her pulse is 95.   General: not in acute distress  Neuro: alert and oriented x 3    Current Outpatient Medications   Medication Sig Dispense Refill    acetaminophen (Tylenol) 325 mg tablet Take 2 tablets (650 mg) by mouth every 4 hours if needed.      blood-glucose sensor (FreeStyle Mandie 3 Sensor) device Change sensor every 14 days 6 each 2    cholecalciferol (Vitamin D-3) 50 mcg (2,000 unit) capsule Take 1 capsule (50 mcg) by mouth once daily.      diphenoxylate-atropine (Lomotil) 2.5-0.025 mg tablet Take 2 " tablets by mouth 4 times a day. TAKE 2 TABLETS 4 TIMES DAILY AS NEEDED FOR DIARRHEA.      fenofibrate (Tricor) 145 mg tablet Take 1 tablet (145 mg) by mouth once daily.      fluconazole (Diflucan) 200 mg tablet Take 1 tablet (200 mg) by mouth once daily. 90 tablet 3    gabapentin (Neurontin) 300 mg capsule Take 1 capsule (300 mg) by mouth once daily at bedtime.      icosapent ethyL (Vascepa) 1 gram capsule Take 2 capsules (2 g) by mouth 2 times a day.      insulin glargine (Basaglar KwikPen U-100 Insulin) 100 unit/mL (3 mL) pen Inject under the skin once daily at bedtime. Take as directed per insulin instructions. Take in the morning      insulin lispro (HumaLOG KwikPen Insulin) 100 unit/mL injection INJECT 10 units before breakfast, 10 units before lunch, and 25 units before dinner. Use sliding scale as directed. MDD 50 units. 45 mL 0    levothyroxine (Synthroid, Levoxyl) 50 mcg tablet       magnesium chloride (Slow-Mag) 71.5 mg tablet,delayed release (DR/EC) Take 1 tablet (71.5 mg) by mouth 2 times a day.      multivitamin with minerals (multivit-min-iron fum-folic ac) tablet Take 1 tablet by mouth once daily.      omeprazole (PriLOSEC) 40 mg DR capsule       predniSONE (Deltasone) 5 mg tablet Take 2 tablets (10 mg) by mouth once daily. 180 tablet 3    rosuvastatin (Crestor) 20 mg tablet Take 1 tablet (20 mg) by mouth once daily.      sertraline (Zoloft) 100 mg tablet Take by mouth once daily.      sodium bicarbonate 650 mg tablet Take 2 tablets (1,300 mg) by mouth every 12 hours.      traZODone (Desyrel) 50 mg tablet Take 1 tablet (50 mg) by mouth once daily at bedtime.       No current facility-administered medications for this visit.         Assessment and Plan  64 y.o. female with hx of type 2 diabetes, hypothyroidism, secondary adrenal insufficiency, HTN, HLD, CKD stage 3, Crohn's disease, recurrent pancreatitis, here for follow-up of diabetes.     1. Type 2 diabetes mellitus  2. Hypertriglyceridemia  HbA1c:  6.8% (6/7/2023), 6.3% (3/3/2023), 8.1% (11/21/2022)  Current regimen: Basaglar 25-30 units BID, Humalog 10-15-30 units TIDAC+SS#2  Eye exam: + DR (01/2022)  Urine microalbumin: 380 micrograms/mg (11/2023) ; managed by nephrology  Lipids: HDL 30, , non-, LDL NA (11/2022) on rosuvastatin 20 mg QDAY, fenofibrate 145 mg QDAY, Vascepa 2 g BID  *Avoid GLP-1 RA due to hx of recurrent pancreatitis  *On prednisone 5 mg QDAY for secondary AI due to chronic steroid use    Mandie download reviewed.   Persistently hyperglycemic all day on some days but relatively euglycemic on other days.  Reviewed Mandie download from 3-4 weeks ago. Glucose levels were more like the pattern seen on 2/24.   Prednisone dose was reduced from 10 mg to 5 mg 4 weeks ago. She has not been started on any other medications that would contribute to hyperglycemia.  Before making adjustments to her insulin dose, I will have her replace her sensor (was planning to start Mandie 3 with next sensor change so I provided her with a Mandie 3 sensor sample to start tomorrow). I also advised her to confirm any consistently elevated glucoses with a fingerstick.  In 1 week, I will review her mandie download and make adjustments as needed.  If glucose looks more like it did 3-4 weeks ago, will need to reduce Basaglar and increase dinner time Humalog.  If the persistently elevated blood glucoses are true readings, will need to increase both Basaglar and Humalog.    PLAN:  -continue Basaglar 25-30 units BID  -Humalog 10-15-30 units TIDAC+SS#2  -check blood sugars before every meal and bedtime (uses Mandie 2 with reader)  -continue Vascepa 2g BID  -will call patient in 1 week  -refer to [facility] for closer monitoring  -check lipids    2. Hypothyroidism  Current regimen: levothyroxine 50 mcg/day     CT chest: 1.1 cm right thyroid nodule.    Labs from 2/16/23  TSH 5.42     TSH previously normal.     PLAN:  -check TSH   -continue levothyroxine 50  mcg/day  -will order thyroid US at next visit    3. Secondary adrenal insufficiency  4. Hx of chronic steroid use for Crohn's disease  Reported falls and a syncopal episode in July 2023.  She also reported lower baseline BP, lightheadedness, and dizziness.  Advised to have labs done to assess for AI but she never got this done.  Cortisol was checked by Dr. Hussein () on 12/7/2023.  Random cortisol (4 pm): 8.7     She has a hx of long-term steroid use for Crohn's disease (prednisone 5 mg QDAY x > 15 years) and apparently had stopped taking prednisone in July 2023.  When Dr. Hussein reached out to me in October 2023, I recommended resuming prednisone 5 mg QDAY.  It appears that she was resumed on prednisone 10 mg QDAY.   Reduced prednisone to 5 mg QDAY in 01/2024.    PLAN:  -continue prednisone to 5 mg QDAY  -check AM cortisol, ACTH (hold prednisone the day prior)     Follow-up in 4 months  Uses [facility].

## 2024-02-26 ENCOUNTER — OFFICE VISIT (OUTPATIENT)
Dept: ENDOCRINOLOGY | Facility: CLINIC | Age: 64
End: 2024-02-26
Payer: COMMERCIAL

## 2024-02-26 VITALS
HEIGHT: 66 IN | BODY MASS INDEX: 22.34 KG/M2 | DIASTOLIC BLOOD PRESSURE: 87 MMHG | HEART RATE: 95 BPM | SYSTOLIC BLOOD PRESSURE: 144 MMHG | WEIGHT: 139 LBS

## 2024-02-26 DIAGNOSIS — E11.9 TYPE 2 DIABETES MELLITUS WITHOUT COMPLICATION, WITH LONG-TERM CURRENT USE OF INSULIN (MULTI): Primary | ICD-10-CM

## 2024-02-26 DIAGNOSIS — E27.49 SECONDARY ADRENAL INSUFFICIENCY (MULTI): ICD-10-CM

## 2024-02-26 DIAGNOSIS — Z79.4 TYPE 2 DIABETES MELLITUS WITHOUT COMPLICATION, WITH LONG-TERM CURRENT USE OF INSULIN (MULTI): Primary | ICD-10-CM

## 2024-02-26 DIAGNOSIS — E78.5 DYSLIPIDEMIA: ICD-10-CM

## 2024-02-26 DIAGNOSIS — E03.9 HYPOTHYROIDISM (ACQUIRED): ICD-10-CM

## 2024-02-26 PROCEDURE — 1036F TOBACCO NON-USER: CPT | Performed by: STUDENT IN AN ORGANIZED HEALTH CARE EDUCATION/TRAINING PROGRAM

## 2024-02-26 PROCEDURE — 95251 CONT GLUC MNTR ANALYSIS I&R: CPT | Performed by: STUDENT IN AN ORGANIZED HEALTH CARE EDUCATION/TRAINING PROGRAM

## 2024-02-26 PROCEDURE — 3077F SYST BP >= 140 MM HG: CPT | Performed by: STUDENT IN AN ORGANIZED HEALTH CARE EDUCATION/TRAINING PROGRAM

## 2024-02-26 PROCEDURE — 99215 OFFICE O/P EST HI 40 MIN: CPT | Performed by: STUDENT IN AN ORGANIZED HEALTH CARE EDUCATION/TRAINING PROGRAM

## 2024-02-26 PROCEDURE — 1159F MED LIST DOCD IN RCRD: CPT | Performed by: STUDENT IN AN ORGANIZED HEALTH CARE EDUCATION/TRAINING PROGRAM

## 2024-02-26 PROCEDURE — 3079F DIAST BP 80-89 MM HG: CPT | Performed by: STUDENT IN AN ORGANIZED HEALTH CARE EDUCATION/TRAINING PROGRAM

## 2024-02-26 PROCEDURE — 1125F AMNT PAIN NOTED PAIN PRSNT: CPT | Performed by: STUDENT IN AN ORGANIZED HEALTH CARE EDUCATION/TRAINING PROGRAM

## 2024-02-26 RX ORDER — INSULIN GLARGINE 100 [IU]/ML
INJECTION, SOLUTION SUBCUTANEOUS NIGHTLY
COMMUNITY
End: 2024-05-22 | Stop reason: SDUPTHER

## 2024-02-26 ASSESSMENT — PAIN SCALES - GENERAL: PAINLEVEL: 8

## 2024-02-27 DIAGNOSIS — E11.9 TYPE 2 DIABETES MELLITUS WITHOUT COMPLICATION, WITH LONG-TERM CURRENT USE OF INSULIN (MULTI): Primary | ICD-10-CM

## 2024-02-27 DIAGNOSIS — Z79.4 TYPE 2 DIABETES MELLITUS WITHOUT COMPLICATION, WITH LONG-TERM CURRENT USE OF INSULIN (MULTI): Primary | ICD-10-CM

## 2024-02-29 ENCOUNTER — LAB (OUTPATIENT)
Dept: LAB | Facility: LAB | Age: 64
End: 2024-02-29
Payer: COMMERCIAL

## 2024-02-29 DIAGNOSIS — Z79.4 TYPE 2 DIABETES MELLITUS WITHOUT COMPLICATION, WITH LONG-TERM CURRENT USE OF INSULIN (MULTI): ICD-10-CM

## 2024-02-29 DIAGNOSIS — B39.9 DISSEMINATED HISTOPLASMOSIS: Chronic | ICD-10-CM

## 2024-02-29 DIAGNOSIS — E27.49 SECONDARY ADRENAL INSUFFICIENCY (MULTI): ICD-10-CM

## 2024-02-29 DIAGNOSIS — E11.9 TYPE 2 DIABETES MELLITUS WITHOUT COMPLICATION, WITH LONG-TERM CURRENT USE OF INSULIN (MULTI): ICD-10-CM

## 2024-02-29 DIAGNOSIS — E78.5 DYSLIPIDEMIA: ICD-10-CM

## 2024-02-29 DIAGNOSIS — E03.9 HYPOTHYROIDISM (ACQUIRED): ICD-10-CM

## 2024-02-29 LAB
CHOLEST SERPL-MCNC: 150 MG/DL (ref 0–199)
CHOLESTEROL/HDL RATIO: 4.4
CORTIS AM PEAK SERPL-MSCNC: 25.6 UG/DL (ref 5–20)
EST. AVERAGE GLUCOSE BLD GHB EST-MCNC: 146 MG/DL
HBA1C MFR BLD: 6.7 %
HDLC SERPL-MCNC: 33.9 MG/DL
LDLC SERPL CALC-MCNC: ABNORMAL MG/DL
NON HDL CHOLESTEROL: 116 MG/DL (ref 0–149)
TRIGL SERPL-MCNC: 415 MG/DL (ref 0–149)
TSH SERPL-ACNC: 4.77 MIU/L (ref 0.44–3.98)
VLDL: ABNORMAL

## 2024-02-29 PROCEDURE — 87385 HISTOPLASMA CAPSUL AG IA: CPT

## 2024-02-29 PROCEDURE — 82533 TOTAL CORTISOL: CPT

## 2024-02-29 PROCEDURE — 86698 HISTOPLASMA ANTIBODY: CPT

## 2024-02-29 PROCEDURE — 83036 HEMOGLOBIN GLYCOSYLATED A1C: CPT

## 2024-02-29 PROCEDURE — 80061 LIPID PANEL: CPT

## 2024-02-29 PROCEDURE — 36415 COLL VENOUS BLD VENIPUNCTURE: CPT

## 2024-02-29 PROCEDURE — 82024 ASSAY OF ACTH: CPT

## 2024-02-29 PROCEDURE — 82985 ASSAY OF GLYCATED PROTEIN: CPT

## 2024-02-29 PROCEDURE — 84443 ASSAY THYROID STIM HORMONE: CPT

## 2024-03-01 ENCOUNTER — TELEPHONE (OUTPATIENT)
Dept: ENDOCRINOLOGY | Facility: CLINIC | Age: 64
End: 2024-03-01
Payer: COMMERCIAL

## 2024-03-01 DIAGNOSIS — E11.9 TYPE 2 DIABETES MELLITUS WITHOUT COMPLICATION, WITH LONG-TERM CURRENT USE OF INSULIN (MULTI): Primary | ICD-10-CM

## 2024-03-01 DIAGNOSIS — Z79.4 TYPE 2 DIABETES MELLITUS WITHOUT COMPLICATION, WITH LONG-TERM CURRENT USE OF INSULIN (MULTI): Primary | ICD-10-CM

## 2024-03-01 DIAGNOSIS — E03.9 HYPOTHYROIDISM (ACQUIRED): ICD-10-CM

## 2024-03-01 NOTE — TELEPHONE ENCOUNTER
Called and spoke to patient regarding recent lab results and glycemic control.    HbA1c 6.7% is not consistent with recent hyperglycemia.  She has chronic anemia, so added on fructosamine to her labs.    AM cortisol 25 which is sufficient. She held prednisone for 2 days and resumed it after she had labs drawn. Will taper prednisone even though she is only on 5 mg/day because she previously had hypotension and syncope when she stopped prednisone abruptly.  Will have her reduce prednisone to 2.5 mg QDAY.    She has not started the Mandie 3 sensors yet, but in reviewing her Mandie download, she is still very hyperglycemic after meals but also has hypoglycemia in the fasting state and sometimes before dinner.  Current regimen: Basaglar 25-30 units BID, Humalog 10-15-30 units TIDAC+SS#2    She took Humalog 30 units for dinner last night (after drinking juice for the hypoglycemia. Did not take anymore Humalog there after.  Ate toast and cottage cheese for lunch today and took Humalog 15 units prior to lunch.  Will reduce bedtime Basaglar but increase dinner Humalog.     New regimen: Basaglar 25 units BID, Humalog 10-15-36 units TIDAC+SS#2    Advised patient to send me a message next week to review blood glucose again.  She verbalized understanding and was very appreciative of the call.

## 2024-03-01 NOTE — TELEPHONE ENCOUNTER
Regarding: A1c  Contact: [phone]  ----- Message from Deedee Ambriz sent at 3/1/2024 12:56 PM EST -----       ----- Message from Reyes, Agnes M to Rosa Maria Amador MD sent at 3/1/2024 12:46 PM -----   How anemic am I?  I thought anemia makes my A1c higher?  So how do we fix this?  Thanks for your time.

## 2024-03-02 LAB — ACTH PLAS-MCNC: 28.9 PG/ML (ref 7.2–63.3)

## 2024-03-03 LAB — FRUCTOSAMINE SERPL-SCNC: 377 UMOL/L (ref 205–285)

## 2024-03-04 LAB — SCAN RESULT: NORMAL

## 2024-03-05 LAB
H CAPSUL AB SER QL ID: NOT DETECTED
H CAPSUL MYC AB TITR SER CF: NORMAL {TITER}
H CAPSUL YST AB TITR SER CF: NORMAL {TITER}

## 2024-03-14 ENCOUNTER — APPOINTMENT (OUTPATIENT)
Dept: NEPHROLOGY | Facility: CLINIC | Age: 64
End: 2024-03-14
Payer: COMMERCIAL

## 2024-03-27 ENCOUNTER — HOSPITAL ENCOUNTER (OUTPATIENT)
Dept: DIALYSIS | Facility: HOSPITAL | Age: 64
Setting detail: DIALYSIS SERIES
Discharge: HOME | End: 2024-03-27
Payer: COMMERCIAL

## 2024-03-27 VITALS
SYSTOLIC BLOOD PRESSURE: 120 MMHG | HEART RATE: 91 BPM | BODY MASS INDEX: 22.95 KG/M2 | WEIGHT: 142.2 LBS | DIASTOLIC BLOOD PRESSURE: 76 MMHG

## 2024-03-27 DIAGNOSIS — Z86.2 HISTORY OF ANEMIA DUE TO CHRONIC KIDNEY DISEASE: ICD-10-CM

## 2024-03-27 DIAGNOSIS — E20.9 HYPOPARATHYROIDISM, UNSPECIFIED HYPOPARATHYROIDISM TYPE (MULTI): Primary | ICD-10-CM

## 2024-03-27 DIAGNOSIS — N18.9 HISTORY OF ANEMIA DUE TO CHRONIC KIDNEY DISEASE: ICD-10-CM

## 2024-03-27 LAB
ALBUMIN SERPL BCP-MCNC: 3.4 G/DL (ref 3.4–5)
ANION GAP SERPL CALC-SCNC: 15 MMOL/L (ref 10–20)
BUN SERPL-MCNC: 43 MG/DL (ref 6–23)
CALCIUM SERPL-MCNC: 9.2 MG/DL (ref 8.6–10.6)
CHLORIDE SERPL-SCNC: 105 MMOL/L (ref 98–107)
CO2 SERPL-SCNC: 22 MMOL/L (ref 21–32)
CREAT SERPL-MCNC: 2.08 MG/DL (ref 0.5–1.05)
EGFRCR SERPLBLD CKD-EPI 2021: 26 ML/MIN/1.73M*2
ERYTHROCYTE [DISTWIDTH] IN BLOOD BY AUTOMATED COUNT: 13.2 % (ref 11.5–14.5)
GLUCOSE SERPL-MCNC: 272 MG/DL (ref 74–99)
HCT VFR BLD AUTO: 33.2 % (ref 36–46)
HGB BLD-MCNC: 9.7 G/DL (ref 12–16)
MCH RBC QN AUTO: 30.9 PG (ref 26–34)
MCHC RBC AUTO-ENTMCNC: 29.2 G/DL (ref 32–36)
MCV RBC AUTO: 106 FL (ref 80–100)
NRBC BLD-RTO: 0 /100 WBCS (ref 0–0)
PHOSPHATE SERPL-MCNC: 4.3 MG/DL (ref 2.5–4.9)
PLATELET # BLD AUTO: 175 X10*3/UL (ref 150–450)
POTASSIUM SERPL-SCNC: 5.9 MMOL/L (ref 3.5–5.3)
RBC # BLD AUTO: 3.14 X10*6/UL (ref 4–5.2)
SODIUM SERPL-SCNC: 136 MMOL/L (ref 136–145)
WBC # BLD AUTO: 6.3 X10*3/UL (ref 4.4–11.3)

## 2024-03-27 PROCEDURE — 6350000001 HC RX 635 EPOETIN >10,000 UNITS: Mod: JG

## 2024-03-27 PROCEDURE — 36415 COLL VENOUS BLD VENIPUNCTURE: CPT | Performed by: INTERNAL MEDICINE

## 2024-03-27 PROCEDURE — 84100 ASSAY OF PHOSPHORUS: CPT | Performed by: INTERNAL MEDICINE

## 2024-03-27 PROCEDURE — 96374 THER/PROPH/DIAG INJ IV PUSH: CPT

## 2024-03-27 PROCEDURE — 85027 COMPLETE CBC AUTOMATED: CPT | Performed by: INTERNAL MEDICINE

## 2024-03-27 PROCEDURE — 36415 COLL VENOUS BLD VENIPUNCTURE: CPT

## 2024-03-27 RX ORDER — DIPHENHYDRAMINE HYDROCHLORIDE 50 MG/ML
50 INJECTION INTRAMUSCULAR; INTRAVENOUS AS NEEDED
Status: CANCELLED | OUTPATIENT
Start: 2024-04-10

## 2024-03-27 RX ADMIN — EPOETIN ALFA 60000 UNITS: 20000 SOLUTION INTRAVENOUS; SUBCUTANEOUS at 14:02

## 2024-03-27 NOTE — PROGRESS NOTES
..How is your energy? Low energy  How is your appetite? Too good, since [place] patient stated she has gained weight.  Any nausea and vomiting after eating? No   Any diarrhea or constipation diarrhea, at times  Any SOB Yes, after walking   Any Edema in lower extremities? NO  Any questions regarding your Kidney disease? NO  DO you have a Dialysis accesses? NO

## 2024-03-29 DIAGNOSIS — E87.5 HYPERKALEMIA: Primary | ICD-10-CM

## 2024-04-07 DIAGNOSIS — Z79.4 TYPE 2 DIABETES MELLITUS WITH HYPERGLYCEMIA, WITH LONG-TERM CURRENT USE OF INSULIN (MULTI): ICD-10-CM

## 2024-04-07 DIAGNOSIS — E11.65 TYPE 2 DIABETES MELLITUS WITH HYPERGLYCEMIA, WITH LONG-TERM CURRENT USE OF INSULIN (MULTI): ICD-10-CM

## 2024-04-08 RX ORDER — INSULIN LISPRO 100 [IU]/ML
INJECTION, SOLUTION INTRAVENOUS; SUBCUTANEOUS
Qty: 45 ML | Refills: 0 | Status: SHIPPED | OUTPATIENT
Start: 2024-04-08 | End: 2024-05-22 | Stop reason: SDUPTHER

## 2024-04-17 ENCOUNTER — PATIENT MESSAGE (OUTPATIENT)
Dept: ENDOCRINOLOGY | Facility: CLINIC | Age: 64
End: 2024-04-17
Payer: COMMERCIAL

## 2024-04-17 DIAGNOSIS — E78.2 MIXED HYPERLIPIDEMIA: ICD-10-CM

## 2024-04-17 DIAGNOSIS — E03.9 HYPOTHYROIDISM, UNSPECIFIED TYPE: ICD-10-CM

## 2024-04-17 DIAGNOSIS — F32.89 OTHER DEPRESSION: ICD-10-CM

## 2024-04-17 DIAGNOSIS — Z79.4 TYPE 2 DIABETES MELLITUS WITH DIABETIC AUTONOMIC NEUROPATHY, WITH LONG-TERM CURRENT USE OF INSULIN (MULTI): Primary | ICD-10-CM

## 2024-04-17 DIAGNOSIS — E11.43 TYPE 2 DIABETES MELLITUS WITH DIABETIC AUTONOMIC NEUROPATHY, WITH LONG-TERM CURRENT USE OF INSULIN (MULTI): Primary | ICD-10-CM

## 2024-04-17 NOTE — TELEPHONE ENCOUNTER
MEDICATION REFILL REQUEST     Rx Requested    Requested Prescriptions     Pending Prescriptions Disp Refills    rosuvastatin (CRESTOR) 20 MG tablet [Pharmacy Med Name: ROSUVASTATIN CALCIUM 20 MG TAB] 90 tablet 0     Sig: take 1 tablet by mouth every morning    levothyroxine (SYNTHROID) 50 MCG tablet [Pharmacy Med Name: LEVOTHYROXINE 50 MCG TABLET] 90 tablet 0     Sig: take 1 tablet by mouth every morning    fenofibrate (TRICOR) 145 MG tablet [Pharmacy Med Name: FENOFIBRATE 145 MG TABLET] 90 tablet 0     Sig: take 1 tablet by mouth once daily    sertraline (ZOLOFT) 100 MG tablet [Pharmacy Med Name: SERTRALINE  MG TABLET] 90 tablet 0     Sig: take 1 tablet by mouth once daily    omeprazole (PRILOSEC) 40 MG delayed release capsule [Pharmacy Med Name: OMEPRAZOLE DR 40 MG CAPSULE] 90 capsule 0     Sig: take 1 capsule by mouth once daily         Patient's Last Office Visit   10/20/2023      Patient's Next Office Visit   No future appointments.      Other comments   /

## 2024-04-18 RX ORDER — OMEPRAZOLE 40 MG/1
CAPSULE, DELAYED RELEASE ORAL
Qty: 90 CAPSULE | Refills: 1 | Status: SHIPPED | OUTPATIENT
Start: 2024-04-18

## 2024-04-18 RX ORDER — LEVOTHYROXINE SODIUM 0.05 MG/1
50 TABLET ORAL EVERY MORNING
Qty: 90 TABLET | Refills: 1 | Status: SHIPPED | OUTPATIENT
Start: 2024-04-18

## 2024-04-18 RX ORDER — GABAPENTIN 300 MG/1
300 CAPSULE ORAL NIGHTLY
Qty: 30 CAPSULE | Refills: 1 | Status: SHIPPED | OUTPATIENT
Start: 2024-04-18 | End: 2024-04-24 | Stop reason: SDUPTHER

## 2024-04-18 RX ORDER — FENOFIBRATE 145 MG/1
145 TABLET, COATED ORAL DAILY
Qty: 90 TABLET | Refills: 1 | Status: SHIPPED | OUTPATIENT
Start: 2024-04-18

## 2024-04-18 RX ORDER — ROSUVASTATIN CALCIUM 20 MG/1
20 TABLET, COATED ORAL EVERY MORNING
Qty: 90 TABLET | Refills: 1 | Status: SHIPPED | OUTPATIENT
Start: 2024-04-18

## 2024-04-18 RX ORDER — SERTRALINE HYDROCHLORIDE 100 MG/1
100 TABLET, FILM COATED ORAL DAILY
Qty: 90 TABLET | Refills: 1 | Status: SHIPPED | OUTPATIENT
Start: 2024-04-18

## 2024-04-22 ENCOUNTER — HOSPITAL ENCOUNTER (OUTPATIENT)
Dept: DIALYSIS | Facility: HOSPITAL | Age: 64
Setting detail: DIALYSIS SERIES
Discharge: HOME | End: 2024-04-22
Payer: COMMERCIAL

## 2024-04-22 VITALS
HEART RATE: 86 BPM | WEIGHT: 143.08 LBS | BODY MASS INDEX: 23.09 KG/M2 | SYSTOLIC BLOOD PRESSURE: 139 MMHG | DIASTOLIC BLOOD PRESSURE: 77 MMHG

## 2024-04-22 DIAGNOSIS — E20.9 HYPOPARATHYROIDISM, UNSPECIFIED HYPOPARATHYROIDISM TYPE (MULTI): Primary | ICD-10-CM

## 2024-04-22 DIAGNOSIS — E87.5 HYPERKALEMIA: ICD-10-CM

## 2024-04-22 DIAGNOSIS — N18.9 HISTORY OF ANEMIA DUE TO CHRONIC KIDNEY DISEASE: ICD-10-CM

## 2024-04-22 DIAGNOSIS — Z86.2 HISTORY OF ANEMIA DUE TO CHRONIC KIDNEY DISEASE: ICD-10-CM

## 2024-04-22 LAB
ALBUMIN SERPL BCP-MCNC: 3.5 G/DL (ref 3.4–5)
ANION GAP SERPL CALC-SCNC: 15 MMOL/L (ref 10–20)
BUN SERPL-MCNC: 32 MG/DL (ref 6–23)
CALCIUM SERPL-MCNC: 9 MG/DL (ref 8.6–10.6)
CHLORIDE SERPL-SCNC: 107 MMOL/L (ref 98–107)
CO2 SERPL-SCNC: 19 MMOL/L (ref 21–32)
CREAT SERPL-MCNC: 2.29 MG/DL (ref 0.5–1.05)
EGFRCR SERPLBLD CKD-EPI 2021: 23 ML/MIN/1.73M*2
ERYTHROCYTE [DISTWIDTH] IN BLOOD BY AUTOMATED COUNT: 13 % (ref 11.5–14.5)
GLUCOSE SERPL-MCNC: 229 MG/DL (ref 74–99)
HCT VFR BLD AUTO: 36 % (ref 36–46)
HGB BLD-MCNC: 10.5 G/DL (ref 12–16)
MCH RBC QN AUTO: 30.5 PG (ref 26–34)
MCHC RBC AUTO-ENTMCNC: 29.2 G/DL (ref 32–36)
MCV RBC AUTO: 105 FL (ref 80–100)
NRBC BLD-RTO: 0 /100 WBCS (ref 0–0)
PHOSPHATE SERPL-MCNC: 3.9 MG/DL (ref 2.5–4.9)
PLATELET # BLD AUTO: 175 X10*3/UL (ref 150–450)
POTASSIUM SERPL-SCNC: 5.9 MMOL/L (ref 3.5–5.3)
RBC # BLD AUTO: 3.44 X10*6/UL (ref 4–5.2)
SODIUM SERPL-SCNC: 135 MMOL/L (ref 136–145)
WBC # BLD AUTO: 7.2 X10*3/UL (ref 4.4–11.3)

## 2024-04-22 PROCEDURE — 85027 COMPLETE CBC AUTOMATED: CPT | Performed by: INTERNAL MEDICINE

## 2024-04-22 PROCEDURE — 36415 COLL VENOUS BLD VENIPUNCTURE: CPT | Performed by: INTERNAL MEDICINE

## 2024-04-22 PROCEDURE — 80069 RENAL FUNCTION PANEL: CPT | Performed by: INTERNAL MEDICINE

## 2024-04-22 PROCEDURE — 6350000001 HC RX 635 EPOETIN >10,000 UNITS: Mod: JG

## 2024-04-22 PROCEDURE — 36415 COLL VENOUS BLD VENIPUNCTURE: CPT

## 2024-04-22 PROCEDURE — 96374 THER/PROPH/DIAG INJ IV PUSH: CPT

## 2024-04-22 RX ORDER — DIPHENHYDRAMINE HYDROCHLORIDE 50 MG/ML
50 INJECTION INTRAMUSCULAR; INTRAVENOUS AS NEEDED
Status: DISCONTINUED | OUTPATIENT
Start: 2024-04-22 | End: 2024-04-23 | Stop reason: HOSPADM

## 2024-04-22 RX ORDER — DIPHENHYDRAMINE HYDROCHLORIDE 50 MG/ML
50 INJECTION INTRAMUSCULAR; INTRAVENOUS AS NEEDED
OUTPATIENT
Start: 2024-04-24

## 2024-04-22 RX ADMIN — EPOETIN ALFA 60000 UNITS: 20000 SOLUTION INTRAVENOUS; SUBCUTANEOUS at 15:39

## 2024-04-22 ASSESSMENT — PAIN SCALES - GENERAL: PAINLEVEL: 0-NO PAIN

## 2024-04-22 NOTE — PROGRESS NOTES
"..How is your energy?  'Zilch\" no energy  How is your appetite? Alright   Any nausea and vomiting after eating? no  Any diarrhea or constipation diarrhea all the time has crohns disease  Any SOB yes with activity  Any Edema in lower extremities? no  Any questions regarding your Kidney disease? no  DO you have a Dialysis accesses?  no    "

## 2024-04-24 DIAGNOSIS — E11.43 TYPE 2 DIABETES MELLITUS WITH DIABETIC AUTONOMIC NEUROPATHY, WITH LONG-TERM CURRENT USE OF INSULIN (MULTI): ICD-10-CM

## 2024-04-24 DIAGNOSIS — Z79.4 TYPE 2 DIABETES MELLITUS WITH DIABETIC AUTONOMIC NEUROPATHY, WITH LONG-TERM CURRENT USE OF INSULIN (MULTI): ICD-10-CM

## 2024-04-24 RX ORDER — GABAPENTIN 300 MG/1
300 CAPSULE ORAL NIGHTLY
Qty: 90 CAPSULE | Refills: 0 | Status: SHIPPED | OUTPATIENT
Start: 2024-04-24

## 2024-05-17 NOTE — PROGRESS NOTES
FUV for diabetes. LV with me 02/2024.    An interactive audio and video telecommunication system which permits real time communications between the patient (at the originating site) and provider (at the distant site) was utilized to provide this telehealth service.    Verbal consent was requested and obtained from Nighat NINO Reyes on 05/22/24 1:03 PM for a telehealth visit.       History of Present Illness   64 y.o. female with hx of type 2 diabetes, hypothyroidism, HTN, HLD, CKD stage 3, Crohn's disease, recurrent pancreatitis.     HbA1c: 6.7% (02/2024), 6.8% (6/7/2023), 6.3% (03/03/2023), 8.1% (11/21/2022)  Current regimen: Basaglar 25 units BID, Humalog 10-15-36 units TIDAC+SS#2  Complications: retinopathy, nephropathy, neuropathy  Comorbidities: HTN, HLD, CKD, recurrent pancreatitis, recurrent UTI     SMBG: Mandie 2      Hypoglycemia: yes, 60s     Diet: low-carb diet  Breakfast and lunch as small meals.  Dinner is her main meal.    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    Other conditions influencing health status 01/28/2022    Uncontrolled insulin dependent diabetes mellitus    Other muscle spasm 10/14/2015    Muscle spasm    Personal history of other diseases of the digestive system 04/03/2014    History of constipation    Personal history of other diseases of the digestive system 10/20/2015    History of acute pancreatitis    Personal history of other specified conditions 04/12/2016    History of  dizziness    Personal history of other specified conditions 10/20/2015    History of abnormal weight loss    Personal history of urinary (tract) infections 2019    History of urinary tract infection    Presence of intraocular lens 2019    Pseudophakia of left eye    Unspecified pre-eclampsia, unspecified trimester ([facility])     Toxemia of pregnancy       Past Surgical History:   Procedure Laterality Date    CHOLECYSTECTOMY  2015    Cholecystectomy    COLECTOMY  2015    Partial Colectomy    TUBAL LIGATION  2015    Tubal Ligation       Social History     Socioeconomic History    Marital status:      Spouse name: Not on file    Number of children: Not on file    Years of education: Not on file    Highest education level: Not on file   Occupational History    Not on file   Tobacco Use    Smoking status: Former     Current packs/day: 0.00     Types: Cigarettes     Quit date:      Years since quittin.3     Passive exposure: Past    Smokeless tobacco: Never   Vaping Use    Vaping status: Never Used   Substance and Sexual Activity    Alcohol use: Never    Drug use: Never    Sexual activity: Not on file   Other Topics Concern    Not on file   Social History Narrative    Not on file     Social Determinants of Health     Financial Resource Strain: Low Risk  (2023)    Received from [facility]    Overall Financial Resource Strain (CARDIA)     Difficulty of Paying Living Expenses: Not hard at all   Food Insecurity: No Food Insecurity (10/31/2023)    Hunger Vital Sign     Worried About Running Out of Food in the Last Year: Never true     Ran Out of Food in the Last Year: Never true   Transportation Needs: Unknown (2023)    Received from [facility]    PRAPARE - Transportation     Lack of Transportation (Medical): Not on file     Lack of Transportation (Non-Medical): No   Physical Activity: Not on file   Stress: Not on file   Social  Connections: Not on file   Intimate Partner Violence: Not on file   Housing Stability: Unknown (5/2/2023)    Received from [facility]    Housing Stability Vital Sign     Unable to Pay for Housing in the Last Year: Not on file     Number of Places Lived in the Last Year: Not on file     Unstable Housing in the Last Year: No       Physical Exam   vitals were not taken for this visit.   General: not in acute distress  Neuro: alert and oriented x 3    Current Outpatient Medications   Medication Sig Dispense Refill    acetaminophen (Tylenol) 325 mg tablet Take 2 tablets (650 mg) by mouth every 4 hours if needed.      blood-glucose sensor (FreeStyle Mandie 3 Sensor) device Change sensor every 14 days 6 each 2    cholecalciferol (Vitamin D-3) 50 mcg (2,000 unit) capsule Take 1 capsule (50 mcg) by mouth once daily.      diphenoxylate-atropine (Lomotil) 2.5-0.025 mg tablet Take 2 tablets by mouth 4 times a day. TAKE 2 TABLETS 4 TIMES DAILY AS NEEDED FOR DIARRHEA.      fenofibrate (Tricor) 145 mg tablet Take 1 tablet (145 mg) by mouth once daily.      fluconazole (Diflucan) 200 mg tablet Take 1 tablet (200 mg) by mouth once daily. 90 tablet 3    gabapentin (Neurontin) 300 mg capsule Take 1 capsule (300 mg) by mouth once daily at bedtime. 90 capsule 0    icosapent ethyL (Vascepa) 1 gram capsule Take 2 capsules (2 g) by mouth 2 times a day.      insulin glargine (Basaglar KwikPen U-100 Insulin) 100 unit/mL (3 mL) pen Inject under the skin once daily at bedtime. Take as directed per insulin instructions. Take in the morning      insulin lispro (HumaLOG KwikPen Insulin) 100 unit/mL injection INJECT 10 UNITS SUBCUTANEOUSLY BEFORE BREAKFAST, 10 UNITS BEFORE LUNCH, AND 25 UNITS BEFORE DINNER. USE SLIDING SCALE AS DIRECTED. MAXIMUM DAILY DOSE 50 UNITS 45 mL 0    levothyroxine (Synthroid, Levoxyl) 50 mcg tablet       magnesium chloride (Slow-Mag) 71.5 mg tablet,delayed release (DR/EC) Take 1 tablet (71.5 mg) by  mouth 2 times a day.      multivitamin with minerals (multivit-min-iron fum-folic ac) tablet Take 1 tablet by mouth once daily.      omeprazole (PriLOSEC) 40 mg DR capsule       predniSONE (Deltasone) 5 mg tablet Take 2 tablets (10 mg) by mouth once daily. 180 tablet 3    rosuvastatin (Crestor) 20 mg tablet Take 1 tablet (20 mg) by mouth once daily.      sertraline (Zoloft) 100 mg tablet Take by mouth once daily.      sodium bicarbonate 650 mg tablet Take 2 tablets (1,300 mg) by mouth every 12 hours.      traZODone (Desyrel) 50 mg tablet Take 1 tablet (50 mg) by mouth once daily at bedtime.       No current facility-administered medications for this visit.       Assessment and Plan  64 y.o. female with hx of type 2 diabetes, hypothyroidism, secondary adrenal insufficiency, HTN, HLD, CKD stage 3, Crohn's disease, recurrent pancreatitis, here for follow-up of diabetes.     1. Type 2 diabetes mellitus  2. Hypertriglyceridemia  HbA1c: 6.7% (02/2024), 6.8% (6/7/2023), 6.3% (3/3/2023), 8.1% (11/21/2022)  Fructosamine: 377 umol/L = A1c 8.0% (02/2024)  Current regimen: Basaglar 25 units BID, Humalog 10-15-36 units TIDAC+SS#2  Eye exam: + DR (01/2022)  Urine microalbumin: 380 micrograms/mg (11/2023) ; managed by nephrology  Lipids: HDL 34, non-,  (02/2024) on rosuvastatin 20 mg QDAY, fenofibrate 145 mg QDAY, Vascepa 2 g BID  *Avoid GLP-1 RA due to hx of recurrent pancreatitis*    A1c inaccurate due to anemia.  Unable to review Mandie today.  She reports that overall her BG has improved.  Still with post-prandial spikes but blood glucose will come down into the 100s over a few hours.  I am unable to safely make adjustments to her insulin without BG data.  She is coming to the main campus next week, so I asked her to stop by my office to have her Mandie downloaded.  I will make adjustments to her regimen once I have reviewed her Mandie download.    PLAN:  -continue Basaglar 25 units BID  -continue Humalog 10-15-36  units TIDAC+SS#2  -check blood sugars before every meal and bedtime (uses Mandie 3 with reader)  -due for eye exam  -continue Vascepa 2g BID  -follow-up with Se CidD for closer monitoring  -check fructosamine, HbA1c    2. Hypothyroidism  Current regimen: levothyroxine 50 mcg, 8 pills/week (since 02/2024)     CT chest: 1.1 cm right thyroid nodule.    Labs from 02/2024  TSH 4.77       PLAN:  -check TSH   -continue levothyroxine 50 mcg, 8 pills/week  -obtain thyroid US    3. Secondary adrenal insufficiency  4. Hx of chronic steroid use for Crohn's disease  Reported falls and a syncopal episode in July 2023.  She also reported lower baseline BP, lightheadedness, and dizziness.  Advised to have labs done to assess for AI but she never got this done.  Cortisol was checked by Dr. Hussein () on 12/7/2023.  Random cortisol (4 pm): 8.7     She has a hx of long-term steroid use for Crohn's disease (prednisone 5 mg QDAY x > 15 years) and apparently had stopped taking prednisone in July 2023.  When Dr. Hussein reached out to me in October 2023, I recommended resuming prednisone 5 mg QDAY.  It appears that she was resumed on prednisone 10 mg QDAY.   Reduced prednisone to 5 mg QDAY in 01/2024.     Labs from 02/2024 (off of prednisone for 2 days)  cortisol 25.6   ACTH 28.9    Recommended reducing prednisone to 2.5 mg QDAY after February labs, but she has remained on the 5 mg dose.  Will reduce to 2.5 mg QDAY.    PLAN:  -reduce prednisone to 2.5 mg QDAY     Follow-up in 4 months  Uses [facility].

## 2024-05-20 ENCOUNTER — APPOINTMENT (OUTPATIENT)
Dept: DIALYSIS | Facility: HOSPITAL | Age: 64
End: 2024-05-20
Payer: COMMERCIAL

## 2024-05-22 ENCOUNTER — TELEMEDICINE (OUTPATIENT)
Dept: ENDOCRINOLOGY | Facility: CLINIC | Age: 64
End: 2024-05-22
Payer: COMMERCIAL

## 2024-05-22 DIAGNOSIS — E78.1 HYPERTRIGLYCERIDEMIA: ICD-10-CM

## 2024-05-22 DIAGNOSIS — E11.65 TYPE 2 DIABETES MELLITUS WITH HYPERGLYCEMIA, WITH LONG-TERM CURRENT USE OF INSULIN (MULTI): ICD-10-CM

## 2024-05-22 DIAGNOSIS — Z79.4 TYPE 2 DIABETES MELLITUS WITH HYPERGLYCEMIA, WITH LONG-TERM CURRENT USE OF INSULIN (MULTI): ICD-10-CM

## 2024-05-22 DIAGNOSIS — E11.9 TYPE 2 DIABETES MELLITUS WITHOUT COMPLICATION, WITH LONG-TERM CURRENT USE OF INSULIN (MULTI): ICD-10-CM

## 2024-05-22 DIAGNOSIS — E03.9 HYPOTHYROIDISM (ACQUIRED): Primary | ICD-10-CM

## 2024-05-22 DIAGNOSIS — Z79.4 TYPE 2 DIABETES MELLITUS WITHOUT COMPLICATION, WITH LONG-TERM CURRENT USE OF INSULIN (MULTI): ICD-10-CM

## 2024-05-22 PROCEDURE — 99215 OFFICE O/P EST HI 40 MIN: CPT | Performed by: STUDENT IN AN ORGANIZED HEALTH CARE EDUCATION/TRAINING PROGRAM

## 2024-05-22 PROCEDURE — 1159F MED LIST DOCD IN RCRD: CPT | Performed by: STUDENT IN AN ORGANIZED HEALTH CARE EDUCATION/TRAINING PROGRAM

## 2024-05-22 PROCEDURE — 3044F HG A1C LEVEL LT 7.0%: CPT | Performed by: STUDENT IN AN ORGANIZED HEALTH CARE EDUCATION/TRAINING PROGRAM

## 2024-05-22 RX ORDER — FLASH GLUCOSE SENSOR
KIT MISCELLANEOUS AS NEEDED
COMMUNITY
Start: 2023-10-30 | End: 2024-05-22 | Stop reason: ALTCHOICE

## 2024-05-22 RX ORDER — ICOSAPENT ETHYL 1 G/1
2 CAPSULE ORAL
Qty: 360 CAPSULE | Refills: 3 | Status: SHIPPED | OUTPATIENT
Start: 2024-05-22

## 2024-05-22 RX ORDER — LIDOCAINE 4 G/100G
1 PATCH TOPICAL DAILY
COMMUNITY
Start: 2023-07-16 | End: 2024-06-04 | Stop reason: ALTCHOICE

## 2024-05-22 RX ORDER — METHOCARBAMOL 500 MG/1
1000 TABLET, FILM COATED ORAL 3 TIMES DAILY
COMMUNITY
Start: 2023-07-16 | End: 2024-06-04 | Stop reason: ALTCHOICE

## 2024-05-22 RX ORDER — INSULIN LISPRO 100 [IU]/ML
INJECTION, SOLUTION INTRAVENOUS; SUBCUTANEOUS
Qty: 75 ML | Refills: 1 | Status: SHIPPED | OUTPATIENT
Start: 2024-05-22

## 2024-05-22 RX ORDER — INSULIN GLARGINE 100 [IU]/ML
INJECTION, SOLUTION SUBCUTANEOUS
Qty: 45 ML | Refills: 1 | Status: SHIPPED | OUTPATIENT
Start: 2024-05-22

## 2024-05-23 ENCOUNTER — APPOINTMENT (OUTPATIENT)
Dept: DIALYSIS | Facility: HOSPITAL | Age: 64
End: 2024-05-23
Payer: COMMERCIAL

## 2024-05-24 ENCOUNTER — TELEMEDICINE CLINICAL SUPPORT (OUTPATIENT)
Dept: ENDOCRINOLOGY | Facility: CLINIC | Age: 64
End: 2024-05-24
Payer: COMMERCIAL

## 2024-05-24 DIAGNOSIS — Z79.4 TYPE 2 DIABETES MELLITUS WITHOUT COMPLICATION, WITH LONG-TERM CURRENT USE OF INSULIN (MULTI): ICD-10-CM

## 2024-05-24 DIAGNOSIS — E11.9 TYPE 2 DIABETES MELLITUS WITHOUT COMPLICATION, WITH LONG-TERM CURRENT USE OF INSULIN (MULTI): ICD-10-CM

## 2024-05-24 PROCEDURE — 99211 OFF/OP EST MAY X REQ PHY/QHP: CPT | Performed by: PHARMACIST

## 2024-05-24 PROCEDURE — 95251 CONT GLUC MNTR ANALYSIS I&R: CPT | Performed by: PHARMACIST

## 2024-05-24 NOTE — PROGRESS NOTES
Clinical Pharmacy Team met with Agnes Reyes regarding a consultation for diabetes management thanks to a refferal from Dr. Rosa Maria Amador MD. Below is a summary of our conversation and recommendations:    Recommendations:  Increase humalog to 17 units with lunch and 38 units with dinner (continue 10 units with breakfast). Add additional insulin per sliding scale  2. Decrease basaglar to 33 units twice daily   3. Patient should continue to use CGM to monitor glucose  ________________________________________________________________________      Allergies   Allergen Reactions    Dye Anaphylaxis     IV Contrast Dye    Iodides Anaphylaxis    Iodinated Contrast Media Anaphylaxis     STOPPED HEART    Toradol [Ketorolac] Shortness of breath and Swelling           Objective     There were no vitals taken for this visit.    Diabetes Pharmacotherapy:    Basaglar 35 units subcutaneous twice daily  Humalog 10-15-36 units TIDAC+SS#2       Lab Review  Lab Results   Component Value Date    BILITOT 0.6 07/15/2023    CALCIUM 9.0 04/22/2024    CO2 19 (L) 04/22/2024     04/22/2024    CREATININE 2.29 (H) 04/22/2024    GLUCOSE 229 (H) 04/22/2024    ALKPHOS 72 07/15/2023    K 5.9 (H) 04/22/2024    PROT 7.1 07/15/2023     (L) 04/22/2024    AST 33 07/15/2023    ALT 21 07/15/2023    BUN 32 (H) 04/22/2024    ANIONGAP 15 04/22/2024    MG 1.61 11/09/2023    PHOS 3.9 04/22/2024    ALBUMIN 3.5 04/22/2024    LIPASE 2,186 (H) 11/20/2022    GFRF 23 (A) 09/08/2023     Lab Results   Component Value Date    TRIG 415 (H) 02/29/2024    CHOL 150 02/29/2024    LDLCALC  02/29/2024      Comment:      The calculation of LDL and VLDL are inaccurate when the Triglycerides are greater than 400 mg/dL or when the patient is non-fasting. If LDL measurement is necessary contact the testing laboratory for an alternative LDL assay.                                  Near   Borderline      AGE      Desirable  Optimal    High     High     Very High     0-19  Y     0 - 109     ---    110-129   >/= 130     ----    20-24 Y     0 - 119     ---    120-159   >/= 160     ----      >24 Y     0 -  99   100-129  130-159   160-189     >/=190      HDL 33.9 2024     Lab Results   Component Value Date    HGBA1C 6.7 (H) 2024    HGBA1C 6.8 (A) 2023    HGBA1C 6.3 (A) 2023     The 10-year ASCVD risk score (Irina ANDREWS, et al., 2019) is: 17.1%    Values used to calculate the score:      Age: 64 years      Sex: Female      Is Non- : No      Diabetic: Yes      Tobacco smoker: No      Systolic Blood Pressure: 139 mmHg      Is BP treated: Yes      HDL Cholesterol: 33.9 mg/dL      Total Cholesterol: 150 mg/dL      Monitoring         Assessment/Plan     The patient reports today for a diabetes consultation. Reviewed CGM report and discussed it with the patient. TIR is not at goal and shows periods of highs and lows. Highs are mainly post meals with lunch and dinner. As a result recommend increase humalog to 17 units with lunch and 38 units with dinner (continue 10 units with breakfast). Add additional insulin per sliding scale. Lows tend to be late evening or overnight. To address this recommend decrease basaglar to 33 units twice daily. Patient was agreeable to these recommendations    A minimum of 72 hours of CGM data was reviewed and used to make therapy changes.        PATIENT EDUCATION/GOALS      Goals  Fasting B - 130 mg/dL  Postprandial BG: less than 180 mg/dL  A1c: less than 7%    Provided counseling on lifestyle modifications, medications, and self-monitoring. Patient has no additional questions at this time. Pharmacy to follow up in 5-6 weeks. Please reach out with any questions. Thank you.       Se Lei, PharmD    Provider on site: Mague Whitaker DNP  Continue all meds under the continuation of care with the referring provider and clinical pharmacy team.

## 2024-05-31 ENCOUNTER — APPOINTMENT (OUTPATIENT)
Dept: DIALYSIS | Facility: HOSPITAL | Age: 64
End: 2024-05-31
Payer: COMMERCIAL

## 2024-06-04 ENCOUNTER — HOSPITAL ENCOUNTER (OUTPATIENT)
Dept: DIALYSIS | Facility: HOSPITAL | Age: 64
Setting detail: DIALYSIS SERIES
Discharge: HOME | End: 2024-06-04
Payer: COMMERCIAL

## 2024-06-04 VITALS
SYSTOLIC BLOOD PRESSURE: 122 MMHG | DIASTOLIC BLOOD PRESSURE: 78 MMHG | HEART RATE: 93 BPM | BODY MASS INDEX: 22.95 KG/M2 | WEIGHT: 142.2 LBS

## 2024-06-04 DIAGNOSIS — E20.9 HYPOPARATHYROIDISM, UNSPECIFIED HYPOPARATHYROIDISM TYPE (MULTI): Primary | ICD-10-CM

## 2024-06-04 DIAGNOSIS — Z86.2 HISTORY OF ANEMIA DUE TO CHRONIC KIDNEY DISEASE: ICD-10-CM

## 2024-06-04 DIAGNOSIS — N18.9 HISTORY OF ANEMIA DUE TO CHRONIC KIDNEY DISEASE: ICD-10-CM

## 2024-06-04 LAB
ALBUMIN SERPL BCP-MCNC: 3.5 G/DL (ref 3.4–5)
ANION GAP SERPL CALC-SCNC: 14 MMOL/L (ref 10–20)
BUN SERPL-MCNC: 34 MG/DL (ref 6–23)
CALCIUM SERPL-MCNC: 8.4 MG/DL (ref 8.6–10.6)
CHLORIDE SERPL-SCNC: 113 MMOL/L (ref 98–107)
CO2 SERPL-SCNC: 20 MMOL/L (ref 21–32)
CREAT SERPL-MCNC: 1.99 MG/DL (ref 0.5–1.05)
EGFRCR SERPLBLD CKD-EPI 2021: 27 ML/MIN/1.73M*2
ERYTHROCYTE [DISTWIDTH] IN BLOOD BY AUTOMATED COUNT: 14.7 % (ref 11.5–14.5)
GLUCOSE SERPL-MCNC: 106 MG/DL (ref 74–99)
HCT VFR BLD AUTO: 31.2 % (ref 36–46)
HGB BLD-MCNC: 9.5 G/DL (ref 12–16)
MCH RBC QN AUTO: 30.9 PG (ref 26–34)
MCHC RBC AUTO-ENTMCNC: 30.4 G/DL (ref 32–36)
MCV RBC AUTO: 102 FL (ref 80–100)
NRBC BLD-RTO: 0 /100 WBCS (ref 0–0)
PHOSPHATE SERPL-MCNC: 4.1 MG/DL (ref 2.5–4.9)
PLATELET # BLD AUTO: 174 X10*3/UL (ref 150–450)
POTASSIUM SERPL-SCNC: 4.6 MMOL/L (ref 3.5–5.3)
PTH-INTACT SERPL-MCNC: 16.5 PG/ML (ref 18.5–88)
RBC # BLD AUTO: 3.07 X10*6/UL (ref 4–5.2)
SODIUM SERPL-SCNC: 142 MMOL/L (ref 136–145)
WBC # BLD AUTO: 10.1 X10*3/UL (ref 4.4–11.3)

## 2024-06-04 PROCEDURE — 84100 ASSAY OF PHOSPHORUS: CPT | Performed by: INTERNAL MEDICINE

## 2024-06-04 PROCEDURE — 85027 COMPLETE CBC AUTOMATED: CPT | Performed by: INTERNAL MEDICINE

## 2024-06-04 PROCEDURE — 6350000001 HC RX 635 EPOETIN >10,000 UNITS: Mod: JW,JG

## 2024-06-04 PROCEDURE — 36415 COLL VENOUS BLD VENIPUNCTURE: CPT

## 2024-06-04 PROCEDURE — 96374 THER/PROPH/DIAG INJ IV PUSH: CPT

## 2024-06-04 PROCEDURE — 83970 ASSAY OF PARATHORMONE: CPT | Performed by: INTERNAL MEDICINE

## 2024-06-04 PROCEDURE — 36415 COLL VENOUS BLD VENIPUNCTURE: CPT | Performed by: INTERNAL MEDICINE

## 2024-06-04 RX ORDER — DIPHENHYDRAMINE HYDROCHLORIDE 50 MG/ML
50 INJECTION INTRAMUSCULAR; INTRAVENOUS AS NEEDED
OUTPATIENT
Start: 2024-06-17

## 2024-06-04 RX ADMIN — ERYTHROPOIETIN 60000 UNITS: 40000 INJECTION, SOLUTION INTRAVENOUS; SUBCUTANEOUS at 14:51

## 2024-06-04 ASSESSMENT — PAIN SCALES - GENERAL: PAINLEVEL: 0-NO PAIN

## 2024-06-04 NOTE — PROGRESS NOTES
..How is your energy?  Very low  How is your appetite?  So-so some days hungry some not  Any nausea and vomiting after eating? no9  Any diarrhea or constipation  diarrhea always has chron's  Any SOB [id number] to far 25 ft SOB  Any Edema in lower extremities? None   Any questions regarding your Kidney disease? No   DO you have a Dialysis accesses?       no

## 2024-06-05 ENCOUNTER — HOSPITAL ENCOUNTER (OUTPATIENT)
Dept: RADIOLOGY | Facility: CLINIC | Age: 64
Discharge: HOME | End: 2024-06-05
Payer: COMMERCIAL

## 2024-06-05 DIAGNOSIS — E11.9 TYPE 2 DIABETES MELLITUS WITHOUT COMPLICATION, WITH LONG-TERM CURRENT USE OF INSULIN (MULTI): ICD-10-CM

## 2024-06-05 DIAGNOSIS — Z79.4 TYPE 2 DIABETES MELLITUS WITHOUT COMPLICATION, WITH LONG-TERM CURRENT USE OF INSULIN (MULTI): ICD-10-CM

## 2024-06-05 DIAGNOSIS — E03.9 HYPOTHYROIDISM (ACQUIRED): ICD-10-CM

## 2024-06-05 PROCEDURE — 76536 US EXAM OF HEAD AND NECK: CPT | Performed by: STUDENT IN AN ORGANIZED HEALTH CARE EDUCATION/TRAINING PROGRAM

## 2024-06-05 PROCEDURE — 76536 US EXAM OF HEAD AND NECK: CPT

## 2024-06-17 ENCOUNTER — APPOINTMENT (OUTPATIENT)
Dept: DIALYSIS | Facility: HOSPITAL | Age: 64
End: 2024-06-17
Payer: COMMERCIAL

## 2024-07-02 ENCOUNTER — APPOINTMENT (OUTPATIENT)
Dept: DIALYSIS | Facility: HOSPITAL | Age: 64
End: 2024-07-02
Payer: COMMERCIAL

## 2024-07-08 ENCOUNTER — HOSPITAL ENCOUNTER (OUTPATIENT)
Dept: DIALYSIS | Facility: HOSPITAL | Age: 64
Setting detail: DIALYSIS SERIES
Discharge: HOME | End: 2024-07-08
Payer: COMMERCIAL

## 2024-07-08 VITALS
WEIGHT: 140.87 LBS | BODY MASS INDEX: 22.74 KG/M2 | HEART RATE: 100 BPM | SYSTOLIC BLOOD PRESSURE: 90 MMHG | DIASTOLIC BLOOD PRESSURE: 64 MMHG

## 2024-07-08 DIAGNOSIS — Z86.2 HISTORY OF ANEMIA DUE TO CHRONIC KIDNEY DISEASE: ICD-10-CM

## 2024-07-08 DIAGNOSIS — N18.9 HISTORY OF ANEMIA DUE TO CHRONIC KIDNEY DISEASE: ICD-10-CM

## 2024-07-08 DIAGNOSIS — E20.9 HYPOPARATHYROIDISM, UNSPECIFIED HYPOPARATHYROIDISM TYPE (MULTI): Primary | ICD-10-CM

## 2024-07-08 LAB
ALBUMIN SERPL BCP-MCNC: 3.7 G/DL (ref 3.4–5)
ANION GAP SERPL CALC-SCNC: 13 MMOL/L (ref 10–20)
BUN SERPL-MCNC: 28 MG/DL (ref 6–23)
CALCIUM SERPL-MCNC: 9.5 MG/DL (ref 8.6–10.6)
CHLORIDE SERPL-SCNC: 107 MMOL/L (ref 98–107)
CO2 SERPL-SCNC: 22 MMOL/L (ref 21–32)
CREAT SERPL-MCNC: 1.98 MG/DL (ref 0.5–1.05)
EGFRCR SERPLBLD CKD-EPI 2021: 28 ML/MIN/1.73M*2
ERYTHROCYTE [DISTWIDTH] IN BLOOD BY AUTOMATED COUNT: 13.2 % (ref 11.5–14.5)
FERRITIN SERPL-MCNC: 839 NG/ML (ref 8–150)
GLUCOSE SERPL-MCNC: 194 MG/DL (ref 74–99)
HCT VFR BLD AUTO: 32.2 % (ref 36–46)
HGB BLD-MCNC: 10.1 G/DL (ref 12–16)
IRON SATN MFR SERPL: 37 % (ref 25–45)
IRON SERPL-MCNC: 140 UG/DL (ref 35–150)
MCH RBC QN AUTO: 30.7 PG (ref 26–34)
MCHC RBC AUTO-ENTMCNC: 31.4 G/DL (ref 32–36)
MCV RBC AUTO: 98 FL (ref 80–100)
NRBC BLD-RTO: 0 /100 WBCS (ref 0–0)
PHOSPHATE SERPL-MCNC: 2.6 MG/DL (ref 2.5–4.9)
PLATELET # BLD AUTO: 206 X10*3/UL (ref 150–450)
POTASSIUM SERPL-SCNC: 4.1 MMOL/L (ref 3.5–5.3)
PTH-INTACT SERPL-MCNC: 15 PG/ML (ref 18.5–88)
RBC # BLD AUTO: 3.29 X10*6/UL (ref 4–5.2)
SODIUM SERPL-SCNC: 138 MMOL/L (ref 136–145)
TIBC SERPL-MCNC: 383 UG/DL (ref 240–445)
TRANSFERRIN SERPL-MCNC: 267 MG/DL (ref 200–360)
UIBC SERPL-MCNC: 243 UG/DL (ref 110–370)
WBC # BLD AUTO: 9.6 X10*3/UL (ref 4.4–11.3)

## 2024-07-08 PROCEDURE — 82728 ASSAY OF FERRITIN: CPT | Performed by: INTERNAL MEDICINE

## 2024-07-08 PROCEDURE — 36415 COLL VENOUS BLD VENIPUNCTURE: CPT | Performed by: INTERNAL MEDICINE

## 2024-07-08 PROCEDURE — 80069 RENAL FUNCTION PANEL: CPT | Performed by: INTERNAL MEDICINE

## 2024-07-08 PROCEDURE — 83970 ASSAY OF PARATHORMONE: CPT | Performed by: INTERNAL MEDICINE

## 2024-07-08 PROCEDURE — 83540 ASSAY OF IRON: CPT | Performed by: INTERNAL MEDICINE

## 2024-07-08 PROCEDURE — 36415 COLL VENOUS BLD VENIPUNCTURE: CPT

## 2024-07-08 PROCEDURE — 6350000001 HC RX 635 EPOETIN >10,000 UNITS: Mod: JG

## 2024-07-08 PROCEDURE — 85027 COMPLETE CBC AUTOMATED: CPT | Performed by: INTERNAL MEDICINE

## 2024-07-08 PROCEDURE — 96375 TX/PRO/DX INJ NEW DRUG ADDON: CPT

## 2024-07-08 PROCEDURE — 84466 ASSAY OF TRANSFERRIN: CPT | Performed by: INTERNAL MEDICINE

## 2024-07-08 RX ORDER — DIPHENHYDRAMINE HYDROCHLORIDE 50 MG/ML
50 INJECTION INTRAMUSCULAR; INTRAVENOUS AS NEEDED
OUTPATIENT
Start: 2024-07-30

## 2024-07-08 ASSESSMENT — PAIN SCALES - GENERAL: PAINLEVEL: 0-NO PAIN

## 2024-07-08 NOTE — PROGRESS NOTES
..How is your energy?  Low gets closer to epo shot it gets worse happens a lot more often  resting a lot in the things she does cooking   How is your appetite? To good  Any nausea and vomiting after eating?  none  Any diarrhea or constipation?  Diarrhea x 3 days  has chrons disease  Any SOB? With exertion  increasing   Any Edema in lower extremities?   none  Any questions regarding your Kidney disease? no  DO you have a Dialysis accesses? no

## 2024-07-15 ENCOUNTER — APPOINTMENT (OUTPATIENT)
Dept: DIALYSIS | Facility: HOSPITAL | Age: 64
End: 2024-07-15
Payer: COMMERCIAL

## 2024-07-24 ENCOUNTER — LAB (OUTPATIENT)
Dept: LAB | Facility: LAB | Age: 64
End: 2024-07-24
Payer: COMMERCIAL

## 2024-07-24 ENCOUNTER — APPOINTMENT (OUTPATIENT)
Dept: NEPHROLOGY | Facility: CLINIC | Age: 64
End: 2024-07-24
Payer: COMMERCIAL

## 2024-07-24 VITALS
BODY MASS INDEX: 22.34 KG/M2 | WEIGHT: 139 LBS | HEART RATE: 95 BPM | SYSTOLIC BLOOD PRESSURE: 141 MMHG | DIASTOLIC BLOOD PRESSURE: 101 MMHG | HEIGHT: 66 IN

## 2024-07-24 DIAGNOSIS — N39.0 URINARY TRACT INFECTION WITHOUT HEMATURIA, SITE UNSPECIFIED: Primary | ICD-10-CM

## 2024-07-24 DIAGNOSIS — N39.0 URINARY TRACT INFECTION WITHOUT HEMATURIA, SITE UNSPECIFIED: ICD-10-CM

## 2024-07-24 PROCEDURE — 99214 OFFICE O/P EST MOD 30 MIN: CPT | Performed by: INTERNAL MEDICINE

## 2024-07-24 PROCEDURE — 87086 URINE CULTURE/COLONY COUNT: CPT

## 2024-07-24 PROCEDURE — 81001 URINALYSIS AUTO W/SCOPE: CPT

## 2024-07-24 PROCEDURE — 3075F SYST BP GE 130 - 139MM HG: CPT | Performed by: INTERNAL MEDICINE

## 2024-07-24 PROCEDURE — 3080F DIAST BP >= 90 MM HG: CPT | Performed by: INTERNAL MEDICINE

## 2024-07-24 PROCEDURE — 1159F MED LIST DOCD IN RCRD: CPT | Performed by: INTERNAL MEDICINE

## 2024-07-24 PROCEDURE — 87186 SC STD MICRODIL/AGAR DIL: CPT

## 2024-07-24 PROCEDURE — 3044F HG A1C LEVEL LT 7.0%: CPT | Performed by: INTERNAL MEDICINE

## 2024-07-24 PROCEDURE — 3008F BODY MASS INDEX DOCD: CPT | Performed by: INTERNAL MEDICINE

## 2024-07-24 NOTE — PROGRESS NOTES
" Agnes M Reyes is a 64 y.o. female here in follow up for CKD    Subjective   C/o wt gain over the last few month in spite of not eating more. Her diabetes is better controlled and she remains on low dose Prednisone daily. Has persistent lightheadedness with standing. Recent home BP measurement not requested, but occasional measurements much lower than here today. Attends anemia management clinic monthly. Started having dysuria and frequency yesterday, but no fevers, chills or flank pain.        ROS  All the rest reviewed and negative    Objective     Vital signs    BP (!) 141/101 (BP Location: Right arm, Patient Position: Sitting)   Pulse 95   Ht 1.664 m (5' 5.5\")   Wt 63 kg (139 lb)   BMI 22.78 kg/m²     Physical Exam  Constitutional: pale, in NAD  Psychiatric:  appropriate mood and behavior    HEENT: NC/AT, clear sclerae, moist mucous membranes  Cardiovascular: normal S1, S2, RRR,  no murmurs, gallop or rubs  Pulmonary:  Normal breath sounds  Extremities: no edema  Skin:  warm and dry    Meds    Current Outpatient Medications:     acetaminophen (Tylenol) 325 mg tablet, Take 2 tablets (650 mg) by mouth every 4 hours if needed., Disp: , Rfl:     blood-glucose sensor (FreeStyle Mandie 3 Sensor) device, Change sensor every 14 days, Disp: 6 each, Rfl: 2    cholecalciferol (Vitamin D-3) 50 mcg (2,000 unit) capsule, Take 1 capsule (50 mcg) by mouth once daily., Disp: , Rfl:     diphenoxylate-atropine (Lomotil) 2.5-0.025 mg tablet, Take 2 tablets by mouth 4 times a day. TAKE 2 TABLETS 4 TIMES DAILY AS NEEDED FOR DIARRHEA., Disp: , Rfl:     fenofibrate (Tricor) 145 mg tablet, Take 1 tablet (145 mg) by mouth once daily., Disp: , Rfl:     fluconazole (Diflucan) 200 mg tablet, Take 1 tablet (200 mg) by mouth once daily., Disp: 90 tablet, Rfl: 3    gabapentin (Neurontin) 300 mg capsule, Take 1 capsule (300 mg) by mouth once daily at bedtime., Disp: 90 capsule, Rfl: 0    icosapent ethyL (Vascepa) 1 gram capsule, Take 2 " capsules (2 g) by mouth 2 times daily (morning and late afternoon)., Disp: 360 capsule, Rfl: 3    insulin glargine (Basaglar KwikPen U-100 Insulin) 100 unit/mL (3 mL) pen, Inject 25 units twice a day, Disp: 45 mL, Rfl: 1    insulin lispro (HumaLOG KwikPen Insulin) 100 unit/mL injection, INJECT 10 UNITS SUBCUTANEOUSLY BEFORE BREAKFAST, 15 UNITS BEFORE LUNCH, AND 36 UNITS BEFORE DINNER. USE SLIDING SCALE AS DIRECTED. MDD 80 UNITS, Disp: 75 mL, Rfl: 1    levothyroxine (Synthroid, Levoxyl) 50 mcg tablet, , Disp: , Rfl:     magnesium chloride (Slow-Mag) 71.5 mg tablet,delayed release (DR/EC), Take 1 tablet (71.5 mg) by mouth 2 times a day., Disp: , Rfl:     multivitamin with minerals (multivit-min-iron fum-folic ac) tablet, Take 1 tablet by mouth once daily., Disp: , Rfl:     omeprazole (PriLOSEC) 40 mg DR capsule, , Disp: , Rfl:     predniSONE (Deltasone) 5 mg tablet, Take 2 tablets (10 mg) by mouth once daily. (Patient taking differently: Take 0.5 tablets (2.5 mg) by mouth once daily.), Disp: 180 tablet, Rfl: 3    rosuvastatin (Crestor) 20 mg tablet, Take 1 tablet (20 mg) by mouth once daily., Disp: , Rfl:     sertraline (Zoloft) 100 mg tablet, Take by mouth once daily., Disp: , Rfl:     sodium bicarbonate 650 mg tablet, Take 2 tablets (1,300 mg) by mouth every 12 hours., Disp: , Rfl:     traZODone (Desyrel) 50 mg tablet, Take 1 tablet (50 mg) by mouth once daily at bedtime., Disp: , Rfl:      Allergies  Allergies   Allergen Reactions    Dye Anaphylaxis     IV Contrast Dye    Iodides Anaphylaxis    Iodinated Contrast Media Anaphylaxis     STOPPED HEART    Toradol [Ketorolac] Shortness of breath and Swelling        Results  Recent Results (from the past 672 hour(s))   PTH, Intact    Collection Time: 07/08/24  1:50 PM   Result Value Ref Range    Parathyroid Hormone, Intact 15.0 (L) 18.5 - 88.0 pg/mL   Transferrin    Collection Time: 07/08/24  1:50 PM   Result Value Ref Range    Transferrin 267 200 - 360 mg/dL   Ferritin     Collection Time: 07/08/24  1:50 PM   Result Value Ref Range    Ferritin 839 (H) 8 - 150 ng/mL   Iron and TIBC    Collection Time: 07/08/24  1:50 PM   Result Value Ref Range    Iron 140 35 - 150 ug/dL    UIBC 243 110 - 370 ug/dL    TIBC 383 240 - 445 ug/dL    % Saturation 37 25 - 45 %   Renal Function Panel    Collection Time: 07/08/24  1:50 PM   Result Value Ref Range    Glucose 194 (H) 74 - 99 mg/dL    Sodium 138 136 - 145 mmol/L    Potassium 4.1 3.5 - 5.3 mmol/L    Chloride 107 98 - 107 mmol/L    Bicarbonate 22 21 - 32 mmol/L    Anion Gap 13 10 - 20 mmol/L    Urea Nitrogen 28 (H) 6 - 23 mg/dL    Creatinine 1.98 (H) 0.50 - 1.05 mg/dL    eGFR 28 (L) >60 mL/min/1.73m*2    Calcium 9.5 8.6 - 10.6 mg/dL    Phosphorus 2.6 2.5 - 4.9 mg/dL    Albumin 3.7 3.4 - 5.0 g/dL   CBC    Collection Time: 07/08/24  1:50 PM   Result Value Ref Range    WBC 9.6 4.4 - 11.3 x10*3/uL    nRBC 0.0 0.0 - 0.0 /100 WBCs    RBC 3.29 (L) 4.00 - 5.20 x10*6/uL    Hemoglobin 10.1 (L) 12.0 - 16.0 g/dL    Hematocrit 32.2 (L) 36.0 - 46.0 %    MCV 98 80 - 100 fL    MCH 30.7 26.0 - 34.0 pg    MCHC 31.4 (L) 32.0 - 36.0 g/dL    RDW 13.2 11.5 - 14.5 %    Platelets 206 150 - 450 x10*3/uL       Imaging results  CT chest, abdomen and pelvis 7/23:  ADRENAL GLANDS:   Bilateral adrenal glands appear normal.   KIDNEYS AND URETERS:   The kidneys have normal size and appear unremarkable. A 3 mm   nonobstructing stone is noted lower pole. No hydroureteronephrosis is present.    BLADDER:   The urinary bladder appears within normal limits. There is gas in the anti dependent portion of the bladder.     Assessment and Plan    CKD stage G3-4/A3 likely ischemic and diabetic nephropathy, low suspicion for biologics -induced renal disease; secondary amyloidosis possible. Serum creatinine this month 1.98 mg/dl,  rendering an eGFR of ~28 ml/min/1.73. Urine studies with overt proteinuria however prior hyperkalemia prohibits use of ACEi or ARB for its treatment, unless  administered with Lokelma. Orthostatic hypotension further complicating their use, so on hold for now. Cannot initiate Farxiga with recurrent UTI.      HTN. Sitting office BP generous, however with symptomatic orthostatic hypotension, so will watch for now. Requested home BP measurement am and pm for ~ 5 day and sending readings to us.     3.   Hypoparathyroidism, acquired- noted.    4.  Hypomagnesemia, corrected with ongoing Mg supplements. Recheck Mg with next lab draw.     Anemia. H/H stable. FAZAL in renal treatment clinic. Add iron once ferritin below 1000. Check SPEP, with next blood draw.     6.  Borderline low vitamin D level - supplements doubled today     7.  C/f UTI given dysuria, frequency and numerous episodes of UTI in the last yew years. Asked to discuss appropriateness of UTI prophylaxis with Dr. Silverio at the f/u visit.  UA and culture ordered.     RTC in follow up in 4 months.       Nani Burch MD

## 2024-07-24 NOTE — PATIENT INSTRUCTIONS
Will recheck magnesium with next blood work in renal treatment clinic  I will increase your Epogen dose  Please check home BP in morning and evening for 3-5 days and send readings to me    Hold  on to Lokelma (no need to use it now. It is for high potassium treatment)  See you back in 4-6 months.

## 2024-07-25 LAB
APPEARANCE UR: ABNORMAL
BACTERIA #/AREA URNS AUTO: ABNORMAL /HPF
BILIRUB UR STRIP.AUTO-MCNC: NEGATIVE MG/DL
COLOR UR: YELLOW
GLUCOSE UR STRIP.AUTO-MCNC: NORMAL MG/DL
KETONES UR STRIP.AUTO-MCNC: NEGATIVE MG/DL
LEUKOCYTE ESTERASE UR QL STRIP.AUTO: ABNORMAL
NITRITE UR QL STRIP.AUTO: NEGATIVE
PH UR STRIP.AUTO: 5.5 [PH]
PROT UR STRIP.AUTO-MCNC: ABNORMAL MG/DL
RBC # UR STRIP.AUTO: ABNORMAL /UL
RBC #/AREA URNS AUTO: >20 /HPF
SP GR UR STRIP.AUTO: 1.02
UROBILINOGEN UR STRIP.AUTO-MCNC: NORMAL MG/DL
WBC #/AREA URNS AUTO: >50 /HPF
WBC CLUMPS #/AREA URNS AUTO: ABNORMAL /HPF

## 2024-07-26 LAB — BACTERIA UR CULT: ABNORMAL

## 2024-07-26 RX ORDER — AMOXICILLIN AND CLAVULANATE POTASSIUM 500; 125 MG/1; MG/1
1 TABLET, FILM COATED ORAL 2 TIMES DAILY
Qty: 20 TABLET | Refills: 0 | Status: SHIPPED | OUTPATIENT
Start: 2024-07-26 | End: 2024-08-05

## 2024-07-29 DIAGNOSIS — F51.01 PRIMARY INSOMNIA: ICD-10-CM

## 2024-07-29 RX ORDER — TRAZODONE HYDROCHLORIDE 100 MG/1
100 TABLET ORAL NIGHTLY
Qty: 90 TABLET | Refills: 0 | Status: SHIPPED | OUTPATIENT
Start: 2024-07-29

## 2024-07-29 NOTE — TELEPHONE ENCOUNTER
Pt. Called to advise pharmacy closed asking this be sent to her new pharmacy.       Rx request   Requested Prescriptions     Pending Prescriptions Disp Refills    traZODone (DESYREL) 100 MG tablet 90 tablet 0     Sig: Take 1 tablet by mouth nightly     LOV 10/20/2023  Next Visit Date:  No future appointments.

## 2024-07-30 ENCOUNTER — APPOINTMENT (OUTPATIENT)
Dept: DIALYSIS | Facility: HOSPITAL | Age: 64
End: 2024-07-30
Payer: COMMERCIAL

## 2024-08-01 ENCOUNTER — APPOINTMENT (OUTPATIENT)
Dept: PRIMARY CARE | Facility: CLINIC | Age: 64
End: 2024-08-01
Payer: COMMERCIAL

## 2024-08-05 ENCOUNTER — TELEPHONE (OUTPATIENT)
Dept: ENDOCRINOLOGY | Facility: CLINIC | Age: 64
End: 2024-08-05

## 2024-08-05 ENCOUNTER — HOSPITAL ENCOUNTER (OUTPATIENT)
Dept: DIALYSIS | Facility: HOSPITAL | Age: 64
Setting detail: DIALYSIS SERIES
Discharge: HOME | End: 2024-08-05
Payer: COMMERCIAL

## 2024-08-05 VITALS
DIASTOLIC BLOOD PRESSURE: 87 MMHG | HEART RATE: 84 BPM | BODY MASS INDEX: 23.7 KG/M2 | WEIGHT: 144.62 LBS | SYSTOLIC BLOOD PRESSURE: 155 MMHG

## 2024-08-05 DIAGNOSIS — N18.9 HISTORY OF ANEMIA DUE TO CHRONIC KIDNEY DISEASE: ICD-10-CM

## 2024-08-05 DIAGNOSIS — E20.9 HYPOPARATHYROIDISM, UNSPECIFIED HYPOPARATHYROIDISM TYPE (MULTI): Primary | ICD-10-CM

## 2024-08-05 DIAGNOSIS — Z86.2 HISTORY OF ANEMIA DUE TO CHRONIC KIDNEY DISEASE: ICD-10-CM

## 2024-08-05 LAB
ALBUMIN SERPL BCP-MCNC: 3.6 G/DL (ref 3.4–5)
ANION GAP SERPL CALC-SCNC: 14 MMOL/L (ref 10–20)
BUN SERPL-MCNC: 45 MG/DL (ref 6–23)
CALCIUM SERPL-MCNC: 9.2 MG/DL (ref 8.6–10.6)
CHLORIDE SERPL-SCNC: 109 MMOL/L (ref 98–107)
CO2 SERPL-SCNC: 20 MMOL/L (ref 21–32)
CREAT SERPL-MCNC: 1.97 MG/DL (ref 0.5–1.05)
EGFRCR SERPLBLD CKD-EPI 2021: 28 ML/MIN/1.73M*2
ERYTHROCYTE [DISTWIDTH] IN BLOOD BY AUTOMATED COUNT: 13.3 % (ref 11.5–14.5)
GLUCOSE SERPL-MCNC: 181 MG/DL (ref 74–99)
HCT VFR BLD AUTO: 33.7 % (ref 36–46)
HGB BLD-MCNC: 10.3 G/DL (ref 12–16)
MAGNESIUM SERPL-MCNC: 1.85 MG/DL (ref 1.6–2.4)
MCH RBC QN AUTO: 30.7 PG (ref 26–34)
MCHC RBC AUTO-ENTMCNC: 30.6 G/DL (ref 32–36)
MCV RBC AUTO: 100 FL (ref 80–100)
NRBC BLD-RTO: 0 /100 WBCS (ref 0–0)
PHOSPHATE SERPL-MCNC: 4.2 MG/DL (ref 2.5–4.9)
PLATELET # BLD AUTO: 184 X10*3/UL (ref 150–450)
POTASSIUM SERPL-SCNC: 5.3 MMOL/L (ref 3.5–5.3)
RBC # BLD AUTO: 3.36 X10*6/UL (ref 4–5.2)
SODIUM SERPL-SCNC: 138 MMOL/L (ref 136–145)
WBC # BLD AUTO: 10.7 X10*3/UL (ref 4.4–11.3)

## 2024-08-05 PROCEDURE — 6350000001 HC RX 635 EPOETIN >10,000 UNITS: Mod: JZ,JG

## 2024-08-05 PROCEDURE — 83735 ASSAY OF MAGNESIUM: CPT | Performed by: INTERNAL MEDICINE

## 2024-08-05 PROCEDURE — 96372 THER/PROPH/DIAG INJ SC/IM: CPT

## 2024-08-05 PROCEDURE — 80069 RENAL FUNCTION PANEL: CPT | Performed by: INTERNAL MEDICINE

## 2024-08-05 PROCEDURE — 36415 COLL VENOUS BLD VENIPUNCTURE: CPT

## 2024-08-05 PROCEDURE — 36415 COLL VENOUS BLD VENIPUNCTURE: CPT | Performed by: INTERNAL MEDICINE

## 2024-08-05 PROCEDURE — 85027 COMPLETE CBC AUTOMATED: CPT | Performed by: INTERNAL MEDICINE

## 2024-08-05 PROCEDURE — 2500000004 HC RX 250 GENERAL PHARMACY W/ HCPCS (ALT 636 FOR OP/ED)

## 2024-08-05 RX ORDER — DIPHENHYDRAMINE HYDROCHLORIDE 50 MG/ML
50 INJECTION INTRAMUSCULAR; INTRAVENOUS AS NEEDED
OUTPATIENT
Start: 2024-09-02

## 2024-08-05 ASSESSMENT — PAIN SCALES - GENERAL: PAINLEVEL: 0-NO PAIN

## 2024-08-05 NOTE — TELEPHONE ENCOUNTER
Left a VM this afternoon stating she had missed a call from Dr. Amador and she is returning the call.

## 2024-08-05 NOTE — PROGRESS NOTES
"     ..How is your energy?fair to poor  How is your appetite? \"It's been good\"  Any nausea and vomiting after eating?  Any diarrhea or constipation? Denies   Any SOB? Not at this time  Any Edema in lower extremities? None  Any questions regarding your Kidney disease? Not today \"Dr Burch keeps me updated\"   DO you have a Dialysis accesses? None   IV Iron 100mg and Epogen 80,000 given IVP without any problems   "

## 2024-08-05 NOTE — ADDENDUM NOTE
Encounter addended by: Martha Roberson RN on: 8/5/2024 2:30 PM   Actions taken: Order Reconciliation Section accessed

## 2024-08-16 ENCOUNTER — TELEMEDICINE (OUTPATIENT)
Dept: ENDOCRINOLOGY | Facility: CLINIC | Age: 64
End: 2024-08-16
Payer: COMMERCIAL

## 2024-08-16 DIAGNOSIS — Z79.4 TYPE 2 DIABETES MELLITUS WITHOUT COMPLICATION, WITH LONG-TERM CURRENT USE OF INSULIN (MULTI): ICD-10-CM

## 2024-08-16 DIAGNOSIS — E11.9 TYPE 2 DIABETES MELLITUS WITHOUT COMPLICATION, WITH LONG-TERM CURRENT USE OF INSULIN (MULTI): ICD-10-CM

## 2024-08-16 NOTE — Clinical Note
Emanuel Sanchez. Can you schedule this patient for a 2 week virtual follow up with me? If not avabile can schedule with Will too. Thanks!

## 2024-08-16 NOTE — PROGRESS NOTES
Clinical Pharmacy Team met with Agnes Reyes regarding a consultation for diabetes management thanks to a refferal from Dr. Rosa Maria Amador MD. Below is a summary of our conversation and recommendations:    Recommendations:  Increase humalog to 25-30 units with lunch and  40 units with dinner (continue 10 units with breakfast). Add additional insulin per sliding scale  2. Continue all other DM medications as prescribed  3. Patient should continue to use CGM to monitor glucose  ________________________________________________________________________      Allergies   Allergen Reactions    Dye Anaphylaxis     IV Contrast Dye    Iodides Anaphylaxis    Iodinated Contrast Media Anaphylaxis     STOPPED HEART    Toradol [Ketorolac] Shortness of breath and Swelling           Objective     There were no vitals taken for this visit.    Diabetes Pharmacotherapy:    Basaglar 35 units subcutaneous twice daily  Humalog 10 with breakfast, 20 to 25 with lunch, 35 units with dinner +SS#2       Lab Review  Lab Results   Component Value Date    BILITOT 0.6 07/15/2023    CALCIUM 9.2 08/05/2024    CO2 20 (L) 08/05/2024     (H) 08/05/2024    CREATININE 1.97 (H) 08/05/2024    GLUCOSE 181 (H) 08/05/2024    ALKPHOS 72 07/15/2023    K 5.3 08/05/2024    PROT 7.1 07/15/2023     08/05/2024    AST 33 07/15/2023    ALT 21 07/15/2023    BUN 45 (H) 08/05/2024    ANIONGAP 14 08/05/2024    MG 1.85 08/05/2024    PHOS 4.2 08/05/2024    ALBUMIN 3.6 08/05/2024    LIPASE 2,186 (H) 11/20/2022    GFRF 23 (A) 09/08/2023     Lab Results   Component Value Date    TRIG 415 (H) 02/29/2024    CHOL 150 02/29/2024    LDLCALC  02/29/2024      Comment:      The calculation of LDL and VLDL are inaccurate when the Triglycerides are greater than 400 mg/dL or when the patient is non-fasting. If LDL measurement is necessary contact the testing laboratory for an alternative LDL assay.                                  Near   Borderline      AGE      Desirable   Optimal    High     High     Very High     0-19 Y     0 - 109     ---    110-129   >/= 130     ----    20-24 Y     0 - 119     ---    120-159   >/= 160     ----      >24 Y     0 -  99   100-129  130-159   160-189     >/=190      HDL 33.9 2024     Lab Results   Component Value Date    HGBA1C 6.7 (H) 2024    HGBA1C 6.8 (A) 2023    HGBA1C 6.3 (A) 2023     The 10-year ASCVD risk score (Irina ANDREWS, et al., 2019) is: 20.8%    Values used to calculate the score:      Age: 64 years      Sex: Female      Is Non- : No      Diabetic: Yes      Tobacco smoker: No      Systolic Blood Pressure: 155 mmHg      Is BP treated: Yes      HDL Cholesterol: 33.9 mg/dL      Total Cholesterol: 150 mg/dL      Monitoring         Assessment/Plan     The patient reports today for a diabetes consultation. Reviewed CGM report and discussed it with the patient. TIR is not at goal and shows periods of post prandial highs. Highs are mainly post meals with lunch and dinner. As a result recommend increase humalog to 25 to 30 units with lunch and 40 units with dinner (continue 10 units with breakfast). Add additional insulin per sliding scale.  Patient was agreeable to these recommendations    A minimum of 72 hours of CGM data was reviewed and used to make therapy changes.        PATIENT EDUCATION/GOALS      Goals  Fasting B - 130 mg/dL  Postprandial BG: less than 180 mg/dL  A1c: less than 7%    Provided counseling on lifestyle modifications, medications, and self-monitoring. Patient has no additional questions at this time. Pharmacy to follow up in 5-6 weeks. Please reach out with any questions. Thank you.       Se Lei, PharmD    Provider on site: Mague Whitaker DNP  Continue all meds under the continuation of care with the referring provider and clinical pharmacy team.

## 2024-08-21 DIAGNOSIS — E11.65 TYPE 2 DIABETES MELLITUS WITH HYPERGLYCEMIA, WITH LONG-TERM CURRENT USE OF INSULIN (MULTI): ICD-10-CM

## 2024-08-21 DIAGNOSIS — Z79.4 TYPE 2 DIABETES MELLITUS WITH HYPERGLYCEMIA, WITH LONG-TERM CURRENT USE OF INSULIN (MULTI): ICD-10-CM

## 2024-08-30 ENCOUNTER — APPOINTMENT (OUTPATIENT)
Dept: ENDOCRINOLOGY | Facility: CLINIC | Age: 64
End: 2024-08-30
Payer: COMMERCIAL

## 2024-08-30 DIAGNOSIS — E11.9 TYPE 2 DIABETES MELLITUS WITHOUT COMPLICATION, WITH LONG-TERM CURRENT USE OF INSULIN (MULTI): ICD-10-CM

## 2024-08-30 DIAGNOSIS — Z79.4 TYPE 2 DIABETES MELLITUS WITHOUT COMPLICATION, WITH LONG-TERM CURRENT USE OF INSULIN (MULTI): ICD-10-CM

## 2024-08-30 PROCEDURE — 99211 OFF/OP EST MAY X REQ PHY/QHP: CPT | Performed by: PHARMACIST

## 2024-08-30 PROCEDURE — 95251 CONT GLUC MNTR ANALYSIS I&R: CPT | Performed by: PHARMACIST

## 2024-08-30 NOTE — PROGRESS NOTES
Clinical Pharmacy Team met with Agnes Reyes regarding a consultation for diabetes management thanks to a refferal from Dr. Rosa Maria Amador MD. Below is a summary of our conversation and recommendations:    Recommendations:  Increase humalog to 45 units with dinner. Add additional insulin per sliding scale  Change basaglar to 65 units subcutaneous once a day in the morning   Continue all other DM medications as prescribed  Patient should continue to use CGM to monitor glucose  ________________________________________________________________________      Allergies   Allergen Reactions    Dye Anaphylaxis     IV Contrast Dye    Iodides Anaphylaxis    Iodinated Contrast Media Anaphylaxis     STOPPED HEART    Toradol [Ketorolac] Shortness of breath and Swelling           Objective     There were no vitals taken for this visit.    Diabetes Pharmacotherapy:    Basaglar 35 units subcutaneous twice daily  Humalog 10 with breakfast, 25 to 30 with lunch, 40 units with dinner +SS#2       Lab Review  Lab Results   Component Value Date    BILITOT 0.6 07/15/2023    CALCIUM 9.2 08/05/2024    CO2 20 (L) 08/05/2024     (H) 08/05/2024    CREATININE 1.97 (H) 08/05/2024    GLUCOSE 181 (H) 08/05/2024    ALKPHOS 72 07/15/2023    K 5.3 08/05/2024    PROT 7.1 07/15/2023     08/05/2024    AST 33 07/15/2023    ALT 21 07/15/2023    BUN 45 (H) 08/05/2024    ANIONGAP 14 08/05/2024    MG 1.85 08/05/2024    PHOS 4.2 08/05/2024    ALBUMIN 3.6 08/05/2024    LIPASE 2,186 (H) 11/20/2022    GFRF 23 (A) 09/08/2023     Lab Results   Component Value Date    TRIG 415 (H) 02/29/2024    CHOL 150 02/29/2024    LDLCALC  02/29/2024      Comment:      The calculation of LDL and VLDL are inaccurate when the Triglycerides are greater than 400 mg/dL or when the patient is non-fasting. If LDL measurement is necessary contact the testing laboratory for an alternative LDL assay.                                  Near   Borderline      AGE      Desirable   Optimal    High     High     Very High     0-19 Y     0 - 109     ---    110-129   >/= 130     ----    20-24 Y     0 - 119     ---    120-159   >/= 160     ----      >24 Y     0 -  99   100-129  130-159   160-189     >/=190      HDL 33.9 2024     Lab Results   Component Value Date    HGBA1C 6.7 (H) 2024    HGBA1C 6.8 (A) 2023    HGBA1C 6.3 (A) 2023     The 10-year ASCVD risk score (Irina ANDREWS, et al., 2019) is: 20.8%    Values used to calculate the score:      Age: 64 years      Sex: Female      Is Non- : No      Diabetic: Yes      Tobacco smoker: No      Systolic Blood Pressure: 155 mmHg      Is BP treated: Yes      HDL Cholesterol: 33.9 mg/dL      Total Cholesterol: 150 mg/dL      Monitoring         Assessment/Plan     The patient reports today for a diabetes consultation. Reviewed CGM report and discussed it with the patient. TIR is not at goal and shows periods of post prandial highs as well as overnight lows. Highs are mainly post meals with dinner. As a result recommend increase to 45 units with dinner. Furthermore recommend consolidating basal dose to 65 units of basaglar subcutaneous once daily in the morning to help address her hypoglycemia.   Patient was agreeable to these recommendations    A minimum of 72 hours of CGM data was reviewed and used to make therapy changes.        PATIENT EDUCATION/GOALS      Goals  Fasting B - 130 mg/dL  Postprandial BG: less than 180 mg/dL  A1c: less than 7%    Provided counseling on lifestyle modifications, medications, and self-monitoring. Patient has no additional questions at this time. Pharmacy to follow up in 3-4 weeks. Please reach out with any questions. Thank you.       Se Lei, PharmD    Provider on site: Chrissie Abad CNP  Continue all meds under the continuation of care with the referring provider and clinical pharmacy team.

## 2024-09-04 ENCOUNTER — HOSPITAL ENCOUNTER (OUTPATIENT)
Dept: DIALYSIS | Facility: HOSPITAL | Age: 64
Setting detail: DIALYSIS SERIES
Discharge: HOME | End: 2024-09-04
Payer: COMMERCIAL

## 2024-09-04 VITALS
DIASTOLIC BLOOD PRESSURE: 84 MMHG | HEART RATE: 89 BPM | SYSTOLIC BLOOD PRESSURE: 175 MMHG | BODY MASS INDEX: 24.03 KG/M2 | WEIGHT: 146.61 LBS

## 2024-09-04 DIAGNOSIS — N18.9 HISTORY OF ANEMIA DUE TO CHRONIC KIDNEY DISEASE: ICD-10-CM

## 2024-09-04 DIAGNOSIS — E20.9 HYPOPARATHYROIDISM, UNSPECIFIED HYPOPARATHYROIDISM TYPE (MULTI): Primary | ICD-10-CM

## 2024-09-04 DIAGNOSIS — Z86.2 HISTORY OF ANEMIA DUE TO CHRONIC KIDNEY DISEASE: ICD-10-CM

## 2024-09-04 LAB
ALBUMIN SERPL BCP-MCNC: 4 G/DL (ref 3.4–5)
ANION GAP SERPL CALC-SCNC: 16 MMOL/L (ref 10–20)
BUN SERPL-MCNC: 53 MG/DL (ref 6–23)
CALCIUM SERPL-MCNC: 9.1 MG/DL (ref 8.6–10.6)
CHLORIDE SERPL-SCNC: 107 MMOL/L (ref 98–107)
CO2 SERPL-SCNC: 18 MMOL/L (ref 21–32)
CREAT SERPL-MCNC: 2.47 MG/DL (ref 0.5–1.05)
EGFRCR SERPLBLD CKD-EPI 2021: 21 ML/MIN/1.73M*2
ERYTHROCYTE [DISTWIDTH] IN BLOOD BY AUTOMATED COUNT: 13.9 % (ref 11.5–14.5)
GLUCOSE SERPL-MCNC: 342 MG/DL (ref 74–99)
HCT VFR BLD AUTO: 31.6 % (ref 36–46)
HGB BLD-MCNC: 10.2 G/DL (ref 12–16)
MCH RBC QN AUTO: 30.9 PG (ref 26–34)
MCHC RBC AUTO-ENTMCNC: 32.3 G/DL (ref 32–36)
MCV RBC AUTO: 96 FL (ref 80–100)
NRBC BLD-RTO: 0 /100 WBCS (ref 0–0)
PHOSPHATE SERPL-MCNC: 4.2 MG/DL (ref 2.5–4.9)
PLATELET # BLD AUTO: 186 X10*3/UL (ref 150–450)
POTASSIUM SERPL-SCNC: 5.9 MMOL/L (ref 3.5–5.3)
RBC # BLD AUTO: 3.3 X10*6/UL (ref 4–5.2)
SODIUM SERPL-SCNC: 135 MMOL/L (ref 136–145)
WBC # BLD AUTO: 6.5 X10*3/UL (ref 4.4–11.3)

## 2024-09-04 PROCEDURE — 6350000001 HC RX 635 EPOETIN >10,000 UNITS: Mod: JZ,JG

## 2024-09-04 PROCEDURE — 36415 COLL VENOUS BLD VENIPUNCTURE: CPT | Performed by: INTERNAL MEDICINE

## 2024-09-04 PROCEDURE — 36415 COLL VENOUS BLD VENIPUNCTURE: CPT

## 2024-09-04 PROCEDURE — 96372 THER/PROPH/DIAG INJ SC/IM: CPT

## 2024-09-04 PROCEDURE — 85027 COMPLETE CBC AUTOMATED: CPT | Performed by: INTERNAL MEDICINE

## 2024-09-04 PROCEDURE — 80069 RENAL FUNCTION PANEL: CPT | Performed by: INTERNAL MEDICINE

## 2024-09-04 RX ORDER — INSULIN LISPRO 100 [IU]/ML
INJECTION, SOLUTION INTRAVENOUS; SUBCUTANEOUS
Qty: 90 ML | Refills: 1 | Status: SHIPPED | OUTPATIENT
Start: 2024-09-04

## 2024-09-04 RX ORDER — DIPHENHYDRAMINE HYDROCHLORIDE 50 MG/ML
50 INJECTION INTRAMUSCULAR; INTRAVENOUS AS NEEDED
OUTPATIENT
Start: 2024-09-30

## 2024-09-04 ASSESSMENT — PAIN SCALES - GENERAL: PAINLEVEL: 0-NO PAIN

## 2024-09-04 NOTE — PROGRESS NOTES
..How is your energy? Fair   How is your appetite? Fair, only eating 2 meals a day.   Any nausea and vomiting after eating? None    Any diarrhea or constipation?  Diarrhea from chrons  Any SOB?  Yes, when walking up the steps  Any Edema in lower extremities? No edema in lower leg  Any questions regarding your Kidney disease?  Wanting to know why she is gaining weight   DO you have a Dialysis accesses?  None   Pt is here for her procrit and lab work today     Received 80,000units of procrit and labs drawn:  CBC, RFP.   Pt had question about a bill from .  Directed her to call the billing helpline or to stop at doctor's office and inquire.

## 2024-09-05 ENCOUNTER — PATIENT MESSAGE (OUTPATIENT)
Dept: NEPHROLOGY | Facility: CLINIC | Age: 64
End: 2024-09-05
Payer: COMMERCIAL

## 2024-09-05 DIAGNOSIS — E87.5 HYPERKALEMIA: ICD-10-CM

## 2024-09-05 DIAGNOSIS — E87.20 METABOLIC ACIDOSIS: Primary | ICD-10-CM

## 2024-09-05 DIAGNOSIS — E87.20 METABOLIC ACIDOSIS: ICD-10-CM

## 2024-09-05 RX ORDER — SODIUM BICARBONATE 650 MG/1
1300 TABLET ORAL 2 TIMES DAILY
Qty: 120 TABLET | Refills: 11 | Status: SHIPPED | OUTPATIENT
Start: 2024-09-05 | End: 2024-09-07 | Stop reason: SDUPTHER

## 2024-09-05 NOTE — PROGRESS NOTES
Elevated s.cr and K for unclear reasons.  Will instruct patient to drink plenty of fluids and repeat labs in one week.

## 2024-09-07 RX ORDER — SODIUM BICARBONATE 650 MG/1
1300 TABLET ORAL 2 TIMES DAILY
Qty: 120 TABLET | Refills: 11 | Status: SHIPPED | OUTPATIENT
Start: 2024-09-07 | End: 2025-09-07

## 2024-09-08 DIAGNOSIS — K50.818 CROHN'S DISEASE OF BOTH SMALL AND LARGE INTESTINE WITH OTHER COMPLICATION (MULTI): Primary | ICD-10-CM

## 2024-09-08 RX ORDER — DIPHENOXYLATE HYDROCHLORIDE AND ATROPINE SULFATE 2.5; .025 MG/1; MG/1
2 TABLET ORAL 4 TIMES DAILY PRN
Qty: 56 TABLET | Refills: 0 | Status: SHIPPED | OUTPATIENT
Start: 2024-09-08 | End: 2024-09-15

## 2024-09-11 ENCOUNTER — LAB (OUTPATIENT)
Dept: LAB | Facility: LAB | Age: 64
End: 2024-09-11
Payer: COMMERCIAL

## 2024-09-11 DIAGNOSIS — Z79.4 TYPE 2 DIABETES MELLITUS WITHOUT COMPLICATION, WITH LONG-TERM CURRENT USE OF INSULIN (MULTI): ICD-10-CM

## 2024-09-11 DIAGNOSIS — E87.5 HYPERKALEMIA: ICD-10-CM

## 2024-09-11 DIAGNOSIS — N18.9 HISTORY OF ANEMIA DUE TO CHRONIC KIDNEY DISEASE: ICD-10-CM

## 2024-09-11 DIAGNOSIS — E03.9 HYPOTHYROIDISM (ACQUIRED): ICD-10-CM

## 2024-09-11 DIAGNOSIS — E11.9 TYPE 2 DIABETES MELLITUS WITHOUT COMPLICATION, WITH LONG-TERM CURRENT USE OF INSULIN (MULTI): ICD-10-CM

## 2024-09-11 DIAGNOSIS — Z86.2 HISTORY OF ANEMIA DUE TO CHRONIC KIDNEY DISEASE: ICD-10-CM

## 2024-09-11 DIAGNOSIS — E20.9 HYPOPARATHYROIDISM, UNSPECIFIED HYPOPARATHYROIDISM TYPE (MULTI): ICD-10-CM

## 2024-09-11 LAB
ALBUMIN SERPL BCP-MCNC: 3.9 G/DL (ref 3.4–5)
ANION GAP SERPL CALC-SCNC: 14 MMOL/L (ref 10–20)
BUN SERPL-MCNC: 30 MG/DL (ref 6–23)
CALCIUM SERPL-MCNC: 9 MG/DL (ref 8.6–10.3)
CHLORIDE SERPL-SCNC: 109 MMOL/L (ref 98–107)
CO2 SERPL-SCNC: 22 MMOL/L (ref 21–32)
CREAT SERPL-MCNC: 2.18 MG/DL (ref 0.5–1.05)
EGFRCR SERPLBLD CKD-EPI 2021: 25 ML/MIN/1.73M*2
ERYTHROCYTE [DISTWIDTH] IN BLOOD BY AUTOMATED COUNT: 15.2 % (ref 11.5–14.5)
FERRITIN SERPL-MCNC: 604 NG/ML (ref 8–150)
GLUCOSE SERPL-MCNC: 228 MG/DL (ref 74–99)
HCT VFR BLD AUTO: 32.8 % (ref 36–46)
HGB BLD-MCNC: 10.1 G/DL (ref 12–16)
IRON SATN MFR SERPL: 21 % (ref 25–45)
IRON SERPL-MCNC: 76 UG/DL (ref 35–150)
MCH RBC QN AUTO: 31.3 PG (ref 26–34)
MCHC RBC AUTO-ENTMCNC: 30.8 G/DL (ref 32–36)
MCV RBC AUTO: 102 FL (ref 80–100)
NRBC BLD-RTO: 0.5 /100 WBCS (ref 0–0)
PHOSPHATE SERPL-MCNC: 3.9 MG/DL (ref 2.5–4.9)
PLATELET # BLD AUTO: 196 X10*3/UL (ref 150–450)
POTASSIUM SERPL-SCNC: 5.4 MMOL/L (ref 3.5–5.3)
RBC # BLD AUTO: 3.23 X10*6/UL (ref 4–5.2)
SODIUM SERPL-SCNC: 140 MMOL/L (ref 136–145)
TIBC SERPL-MCNC: 369 UG/DL (ref 240–445)
TSH SERPL-ACNC: 2.07 MIU/L (ref 0.44–3.98)
UIBC SERPL-MCNC: 293 UG/DL (ref 110–370)
WBC # BLD AUTO: 5.9 X10*3/UL (ref 4.4–11.3)

## 2024-09-11 PROCEDURE — 36415 COLL VENOUS BLD VENIPUNCTURE: CPT

## 2024-09-11 PROCEDURE — 80069 RENAL FUNCTION PANEL: CPT

## 2024-09-11 PROCEDURE — 82728 ASSAY OF FERRITIN: CPT

## 2024-09-11 PROCEDURE — 83036 HEMOGLOBIN GLYCOSYLATED A1C: CPT

## 2024-09-11 PROCEDURE — 85027 COMPLETE CBC AUTOMATED: CPT

## 2024-09-11 PROCEDURE — 83550 IRON BINDING TEST: CPT

## 2024-09-11 PROCEDURE — 83540 ASSAY OF IRON: CPT

## 2024-09-11 PROCEDURE — 84443 ASSAY THYROID STIM HORMONE: CPT

## 2024-09-11 PROCEDURE — 82985 ASSAY OF GLYCATED PROTEIN: CPT

## 2024-09-12 LAB
EST. AVERAGE GLUCOSE BLD GHB EST-MCNC: 151 MG/DL
HBA1C MFR BLD: 6.9 %

## 2024-09-13 LAB — FRUCTOSAMINE SERPL-SCNC: 333 UMOL/L (ref 205–285)

## 2024-09-30 ENCOUNTER — APPOINTMENT (OUTPATIENT)
Dept: ENDOCRINOLOGY | Facility: CLINIC | Age: 64
End: 2024-09-30
Payer: COMMERCIAL

## 2024-10-02 ENCOUNTER — APPOINTMENT (OUTPATIENT)
Dept: DIALYSIS | Facility: HOSPITAL | Age: 64
End: 2024-10-02
Payer: COMMERCIAL

## 2024-10-07 ENCOUNTER — APPOINTMENT (OUTPATIENT)
Dept: ENDOCRINOLOGY | Facility: CLINIC | Age: 64
End: 2024-10-07
Payer: COMMERCIAL

## 2024-10-14 ENCOUNTER — APPOINTMENT (OUTPATIENT)
Dept: ENDOCRINOLOGY | Facility: CLINIC | Age: 64
End: 2024-10-14
Payer: COMMERCIAL

## 2024-10-14 ENCOUNTER — APPOINTMENT (OUTPATIENT)
Dept: DIALYSIS | Facility: HOSPITAL | Age: 64
End: 2024-10-14
Payer: COMMERCIAL

## 2024-10-20 DIAGNOSIS — E03.9 HYPOTHYROIDISM, UNSPECIFIED TYPE: ICD-10-CM

## 2024-10-20 DIAGNOSIS — F32.89 OTHER DEPRESSION: ICD-10-CM

## 2024-10-20 DIAGNOSIS — E78.2 MIXED HYPERLIPIDEMIA: ICD-10-CM

## 2024-10-20 DIAGNOSIS — F51.01 PRIMARY INSOMNIA: ICD-10-CM

## 2024-10-21 RX ORDER — FENOFIBRATE 145 MG/1
145 TABLET, COATED ORAL DAILY
Qty: 90 TABLET | Refills: 1 | Status: SHIPPED | OUTPATIENT
Start: 2024-10-21

## 2024-10-21 RX ORDER — LEVOTHYROXINE SODIUM 50 UG/1
50 TABLET ORAL EVERY MORNING
Qty: 90 TABLET | Refills: 1 | Status: SHIPPED | OUTPATIENT
Start: 2024-10-21

## 2024-10-21 RX ORDER — OMEPRAZOLE 40 MG/1
CAPSULE, DELAYED RELEASE ORAL
Qty: 90 CAPSULE | Refills: 1 | Status: SHIPPED | OUTPATIENT
Start: 2024-10-21

## 2024-10-21 RX ORDER — ROSUVASTATIN CALCIUM 20 MG/1
20 TABLET, COATED ORAL EVERY MORNING
Qty: 90 TABLET | Refills: 1 | Status: SHIPPED | OUTPATIENT
Start: 2024-10-21

## 2024-10-21 RX ORDER — SERTRALINE HYDROCHLORIDE 100 MG/1
100 TABLET, FILM COATED ORAL DAILY
Qty: 90 TABLET | Refills: 1 | Status: SHIPPED | OUTPATIENT
Start: 2024-10-21

## 2024-10-21 RX ORDER — TRAZODONE HYDROCHLORIDE 100 MG/1
100 TABLET ORAL NIGHTLY
Qty: 90 TABLET | Refills: 1 | Status: SHIPPED | OUTPATIENT
Start: 2024-10-21

## 2024-10-21 NOTE — TELEPHONE ENCOUNTER
Please approve or deny request. Thank you!    Rx requested:  Requested Prescriptions     Pending Prescriptions Disp Refills    rosuvastatin (CRESTOR) 20 MG tablet [Pharmacy Med Name: ROSUVASTATIN 20MG TABLETS] 90 tablet 1     Sig: TAKE 1 TABLET BY MOUTH EVERY MORNING    sertraline (ZOLOFT) 100 MG tablet [Pharmacy Med Name: SERTRALINE 100MG TABLETS] 90 tablet 1     Sig: TAKE 1 TABLET BY MOUTH ONCE DAILY    levothyroxine (SYNTHROID) 50 MCG tablet [Pharmacy Med Name: LEVOTHYROXINE 0.05MG (50MCG) TAB] 90 tablet 1     Sig: TAKE 1 TABLET BY MOUTH EVERY MORNING    omeprazole (PRILOSEC) 40 MG delayed release capsule [Pharmacy Med Name: OMEPRAZOLE 40MG CAPSULES] 90 capsule 1     Sig: TAKE 1 CAPSULE BY MOUTH ONCE DAILY    fenofibrate (TRICOR) 145 MG tablet [Pharmacy Med Name: FENOFIBRATE 145MG TABLETS] 90 tablet 1     Sig: TAKE 1 TABLET BY MOUTH ONCE DAILY    traZODone (DESYREL) 100 MG tablet [Pharmacy Med Name: TRAZODONE 100MG TABLETS] 90 tablet 0     Sig: TAKE 1 TABLET BY MOUTH EVERY NIGHT         Last Office Visit:   10/20/2023      Next Visit Date:  Future Appointments   Date Time Provider Department Center   10/30/2024  2:00 PM Марина Watson DO [facility] DEP

## 2024-10-22 ENCOUNTER — APPOINTMENT (OUTPATIENT)
Dept: ENDOCRINOLOGY | Facility: CLINIC | Age: 64
End: 2024-10-22
Payer: COMMERCIAL

## 2024-10-23 DIAGNOSIS — Z86.2 HISTORY OF ANEMIA DUE TO CHRONIC KIDNEY DISEASE: Primary | ICD-10-CM

## 2024-10-23 DIAGNOSIS — N18.9 HISTORY OF ANEMIA DUE TO CHRONIC KIDNEY DISEASE: Primary | ICD-10-CM

## 2024-10-23 DIAGNOSIS — E20.9 HYPOPARATHYROIDISM, UNSPECIFIED HYPOPARATHYROIDISM TYPE (MULTI): ICD-10-CM

## 2024-10-23 RX ORDER — DIPHENHYDRAMINE HYDROCHLORIDE 50 MG/ML
50 INJECTION INTRAMUSCULAR; INTRAVENOUS AS NEEDED
OUTPATIENT
Start: 2024-10-23

## 2024-11-11 ENCOUNTER — APPOINTMENT (OUTPATIENT)
Dept: DIALYSIS | Facility: HOSPITAL | Age: 64
End: 2024-11-11
Payer: COMMERCIAL

## 2024-11-20 ENCOUNTER — APPOINTMENT (OUTPATIENT)
Dept: DIALYSIS | Facility: HOSPITAL | Age: 64
End: 2024-11-20
Payer: COMMERCIAL

## 2024-11-21 ENCOUNTER — APPOINTMENT (OUTPATIENT)
Dept: DIALYSIS | Facility: HOSPITAL | Age: 64
End: 2024-11-21
Payer: COMMERCIAL

## 2024-12-02 ENCOUNTER — PATIENT MESSAGE (OUTPATIENT)
Dept: ENDOCRINOLOGY | Facility: CLINIC | Age: 64
End: 2024-12-02
Payer: COMMERCIAL

## 2024-12-02 DIAGNOSIS — Z79.4 TYPE 2 DIABETES MELLITUS WITH DIABETIC AUTONOMIC NEUROPATHY, WITH LONG-TERM CURRENT USE OF INSULIN: ICD-10-CM

## 2024-12-02 DIAGNOSIS — E11.43 TYPE 2 DIABETES MELLITUS WITH DIABETIC AUTONOMIC NEUROPATHY, WITH LONG-TERM CURRENT USE OF INSULIN: ICD-10-CM

## 2024-12-03 RX ORDER — GABAPENTIN 300 MG/1
300 CAPSULE ORAL NIGHTLY
Qty: 90 CAPSULE | Refills: 1 | Status: SHIPPED | OUTPATIENT
Start: 2024-12-03

## 2024-12-16 DIAGNOSIS — Z79.4 TYPE 2 DIABETES MELLITUS WITHOUT COMPLICATION, WITH LONG-TERM CURRENT USE OF INSULIN (MULTI): ICD-10-CM

## 2024-12-16 DIAGNOSIS — E11.9 TYPE 2 DIABETES MELLITUS WITHOUT COMPLICATION, WITH LONG-TERM CURRENT USE OF INSULIN (MULTI): ICD-10-CM

## 2024-12-17 RX ORDER — BLOOD-GLUCOSE SENSOR
EACH MISCELLANEOUS
Qty: 6 EACH | Refills: 1 | Status: SHIPPED | OUTPATIENT
Start: 2024-12-17

## 2024-12-19 ENCOUNTER — HOSPITAL ENCOUNTER (OUTPATIENT)
Dept: DIALYSIS | Facility: HOSPITAL | Age: 64
Setting detail: DIALYSIS SERIES
Discharge: HOME | End: 2024-12-19
Payer: COMMERCIAL

## 2024-12-19 VITALS
BODY MASS INDEX: 23.41 KG/M2 | TEMPERATURE: 94.6 F | WEIGHT: 142.86 LBS | HEART RATE: 93 BPM | DIASTOLIC BLOOD PRESSURE: 78 MMHG | SYSTOLIC BLOOD PRESSURE: 153 MMHG

## 2024-12-19 DIAGNOSIS — Z86.2 HISTORY OF ANEMIA DUE TO CHRONIC KIDNEY DISEASE: Primary | ICD-10-CM

## 2024-12-19 DIAGNOSIS — N18.9 HISTORY OF ANEMIA DUE TO CHRONIC KIDNEY DISEASE: Primary | ICD-10-CM

## 2024-12-19 DIAGNOSIS — E20.9 HYPOPARATHYROIDISM, UNSPECIFIED HYPOPARATHYROIDISM TYPE (MULTI): ICD-10-CM

## 2024-12-19 LAB — PTH-INTACT SERPL-MCNC: 18.2 PG/ML (ref 18.5–88)

## 2024-12-19 PROCEDURE — 36415 COLL VENOUS BLD VENIPUNCTURE: CPT | Performed by: INTERNAL MEDICINE

## 2024-12-19 PROCEDURE — 83970 ASSAY OF PARATHORMONE: CPT | Performed by: INTERNAL MEDICINE

## 2024-12-19 PROCEDURE — 6350000001 HC RX 635 EPOETIN >10,000 UNITS: Mod: JZ,JG | Performed by: INTERNAL MEDICINE

## 2024-12-19 PROCEDURE — 36415 COLL VENOUS BLD VENIPUNCTURE: CPT

## 2024-12-19 PROCEDURE — 96372 THER/PROPH/DIAG INJ SC/IM: CPT | Performed by: INTERNAL MEDICINE

## 2024-12-19 RX ORDER — DIPHENHYDRAMINE HYDROCHLORIDE 50 MG/ML
50 INJECTION INTRAMUSCULAR; INTRAVENOUS AS NEEDED
OUTPATIENT
Start: 2024-12-25

## 2024-12-19 NOTE — PROGRESS NOTES
Name: Agnes M Reyes   : 1960   MRN: [id number]     Reason for Visit: Injections (Epoetin maye 80,000 units)      Vitals:    24 1153   BP: 153/78   BP Location: Left arm   Patient Position: Sitting   BP Cuff Size: Adult   Pulse: 93   Temp: 34.8 °C (94.6 °F)   TempSrc: Tympanic   Weight: 64.8 kg (142 lb 13.7 oz)        How is your energy? Bad. I feel really weak.  How is your appetite? no  Any nausea and vomiting after eating? no  Any diarrhea or constipation? Diarrhea all the time. Per pt, she has Crohn's disease.  Any SOB? I have trouble breathing recently. Lungs are clear to auscultation. Diminished in the bases.  Any Edema in lower extremities? none  Have you had any recent infections? no  Have you been on antibiotics recently? no  Any questions regarding your Kidney disease? no  DO you have a Dialysis accesses? no    Hemoglobin   Date/Time Value Ref Range Status   2024 12:11 PM 10.1 (L) 12.0 - 16.0 g/dL Final   2024 10:28 AM 10.2 (L) 12.0 - 16.0 g/dL Final     Ferritin   Date/Time Value Ref Range Status   2024 12:11  (H) 8 - 150 ng/mL Final     % Saturation   Date/Time Value Ref Range Status   2024 12:11 PM 21 (L) 25 - 45 % Final        Medications Given in Clinic: Retacrit 80,000 units given subcutaneous to the tricep area of the right arm      Nursing notes:  Labs drawn and sent. Pt tolerated injection without complication.   Pt left clinic accompanied by her .      RN: Vasyl Cleary RN     24

## 2025-01-08 ENCOUNTER — APPOINTMENT (OUTPATIENT)
Dept: NEPHROLOGY | Facility: CLINIC | Age: 65
End: 2025-01-08
Payer: COMMERCIAL

## 2025-01-16 ENCOUNTER — APPOINTMENT (OUTPATIENT)
Dept: DIALYSIS | Facility: HOSPITAL | Age: 65
End: 2025-01-16
Payer: COMMERCIAL

## 2025-01-19 ENCOUNTER — PATIENT MESSAGE (OUTPATIENT)
Dept: ENDOCRINOLOGY | Facility: CLINIC | Age: 65
End: 2025-01-19
Payer: COMMERCIAL

## 2025-01-20 ENCOUNTER — APPOINTMENT (OUTPATIENT)
Dept: ENDOCRINOLOGY | Facility: CLINIC | Age: 65
End: 2025-01-20
Payer: COMMERCIAL

## 2025-01-21 ENCOUNTER — TELEMEDICINE (OUTPATIENT)
Dept: ENDOCRINOLOGY | Facility: CLINIC | Age: 65
End: 2025-01-21
Payer: COMMERCIAL

## 2025-01-21 DIAGNOSIS — E78.1 HYPERTRIGLYCERIDEMIA: ICD-10-CM

## 2025-01-21 DIAGNOSIS — E03.9 HYPOTHYROIDISM (ACQUIRED): ICD-10-CM

## 2025-01-21 DIAGNOSIS — E11.65 TYPE 2 DIABETES MELLITUS WITH HYPERGLYCEMIA, WITH LONG-TERM CURRENT USE OF INSULIN: Primary | ICD-10-CM

## 2025-01-21 DIAGNOSIS — Z79.4 TYPE 2 DIABETES MELLITUS WITH HYPERGLYCEMIA, WITH LONG-TERM CURRENT USE OF INSULIN: Primary | ICD-10-CM

## 2025-01-21 DIAGNOSIS — E27.49 SECONDARY ADRENAL INSUFFICIENCY (MULTI): ICD-10-CM

## 2025-01-21 PROCEDURE — 1159F MED LIST DOCD IN RCRD: CPT | Performed by: STUDENT IN AN ORGANIZED HEALTH CARE EDUCATION/TRAINING PROGRAM

## 2025-01-21 PROCEDURE — 99215 OFFICE O/P EST HI 40 MIN: CPT | Performed by: STUDENT IN AN ORGANIZED HEALTH CARE EDUCATION/TRAINING PROGRAM

## 2025-01-21 PROCEDURE — 95251 CONT GLUC MNTR ANALYSIS I&R: CPT | Performed by: STUDENT IN AN ORGANIZED HEALTH CARE EDUCATION/TRAINING PROGRAM

## 2025-01-21 RX ORDER — PREDNISONE 5 MG/1
2 TABLET ORAL
COMMUNITY
Start: 2024-10-24 | End: 2025-01-21 | Stop reason: DRUGHIGH

## 2025-01-21 RX ORDER — LEVOTHYROXINE SODIUM 50 UG/1
TABLET ORAL
Qty: 90 TABLET | Refills: 3 | Status: SHIPPED | OUTPATIENT
Start: 2025-01-21

## 2025-01-21 RX ORDER — ICOSAPENT ETHYL 1 G/1
2 CAPSULE ORAL 2 TIMES DAILY
Qty: 360 CAPSULE | Refills: 3 | Status: SHIPPED | OUTPATIENT
Start: 2025-01-21

## 2025-01-21 RX ORDER — PREDNISONE 5 MG/1
10 TABLET ORAL
Qty: 45 TABLET | Refills: 3 | Status: SHIPPED | OUTPATIENT
Start: 2025-01-21 | End: 2025-01-21 | Stop reason: WASHOUT

## 2025-01-21 RX ORDER — PREDNISONE 5 MG/1
2.5 TABLET ORAL DAILY
Qty: 45 TABLET | Refills: 3 | Status: SHIPPED | OUTPATIENT
Start: 2025-01-21 | End: 2026-01-21

## 2025-01-21 RX ORDER — INSULIN GLARGINE 100 [IU]/ML
INJECTION, SOLUTION SUBCUTANEOUS
Qty: 75 ML | Refills: 1 | Status: SHIPPED | OUTPATIENT
Start: 2025-01-21

## 2025-01-21 NOTE — PROGRESS NOTES
"FUV for diabetes. LV with me 05/2024.    An interactive audio and video telecommunication system which permits real time communications between the patient (at the originating site) and provider (at the distant site) was utilized to provide this telehealth service.    Verbal consent was requested and obtained from Agnes M Reyes on 01/21/25 12:44 PM for a telehealth visit.     History of Present Illness   64 y.o. female with hx of type 2 diabetes, hypothyroidism, HTN, HLD, ESRD on HD, Crohn's disease, recurrent pancreatitis.     HbA1c: 6.9% (09/2024), 6.7% (02/2024), 6.8% (6/7/2023), 6.3% (03/03/2023), 8.1% (11/21/2022)  Current regimen: Basaglar 35 units BID, Humalog 10-30-45 units TIDAC+SS#2  Complications: retinopathy, nephropathy, neuropathy  Comorbidities: HTN, HLD, CKD, recurrent pancreatitis, recurrent UTI     SMBG: Mandie 3 with trina     Hypoglycemia: yes, 60s     Diet: low-carb diet  Breakfast and lunch as small meals.  Dinner is her main meal.  Latest meal can be around 9 pm.   If she eats earlier (5-6 pm), she may have a snack later.    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    Other conditions influencing health status 01/28/2022    Uncontrolled insulin dependent diabetes mellitus    Other muscle spasm 10/14/2015    Muscle spasm    Personal history of other diseases of the digestive system 04/03/2014    History of constipation    Personal history of other diseases of the digestive system " 10/20/2015    History of acute pancreatitis    Personal history of other specified conditions 2016    History of dizziness    Personal history of other specified conditions 10/20/2015    History of abnormal weight loss    Personal history of urinary (tract) infections 2019    History of urinary tract infection    Presence of intraocular lens 2019    Pseudophakia of left eye    Unspecified pre-eclampsia, unspecified trimester ([facility])     Toxemia of pregnancy       Past Surgical History:   Procedure Laterality Date    CHOLECYSTECTOMY  2015    Cholecystectomy    COLECTOMY  2015    Partial Colectomy    TUBAL LIGATION  2015    Tubal Ligation       Social History     Socioeconomic History    Marital status:      Spouse name: Not on file    Number of children: Not on file    Years of education: Not on file    Highest education level: Not on file   Occupational History    Not on file   Tobacco Use    Smoking status: Former     Current packs/day: 0.00     Types: Cigarettes     Quit date:      Years since quittin.0     Passive exposure: Past    Smokeless tobacco: Never   Vaping Use    Vaping status: Never Used   Substance and Sexual Activity    Alcohol use: Never    Drug use: Never    Sexual activity: Not on file   Other Topics Concern    Not on file   Social History Narrative    Not on file     Social Drivers of Health     Financial Resource Strain: Low Risk  (2023)    Received [facility]    Overall Financial Resource Strain (CARDIA)     Difficulty of Paying Living Expenses: Not hard at all   Food Insecurity: No Food Insecurity (10/31/2023)    Hunger Vital Sign     Worried About Running Out of Food in the Last Year: Never true     Ran Out of Food in the Last Year: Never true   Transportation Needs: Unknown (2023)    Received from [facility]     PRAPARE - Transportation     Lack of Transportation (Medical): Not on file     Lack of Transportation (Non-Medical): No   Physical Activity: Not on file   Stress: Not on file   Social Connections: Not on file   Intimate Partner Violence: Not on file   Housing Stability: Unknown (5/2/2023)    [facility]    Housing Stability Vital Sign     Unable to Pay for Housing in the Last Year: Not on file     Number of Places Lived in the Last Year: Not on file     Unstable Housing in the Last Year: No       Physical Exam   vitals were not taken for this visit.   General: not in acute distress  Neuro: alert and oriented x 3    Current Outpatient Medications   Medication Sig Dispense Refill    acetaminophen (Tylenol) 325 mg tablet Take 2 tablets (650 mg) by mouth every 4 hours if needed.      blood-glucose sensor (3i SystemsStyle Mandie 3 Sensor) device CHANGE SENSOR EVERY 14 DAYS 6 each 1    cholecalciferol (Vitamin D-3) 50 mcg (2,000 unit) capsule Take 1 capsule (50 mcg) by mouth once daily.      fenofibrate (Tricor) 145 mg tablet Take 1 tablet (145 mg) by mouth once daily.      fluconazole (Diflucan) 200 mg tablet Take 1 tablet (200 mg) by mouth once daily. 90 tablet 3    gabapentin (Neurontin) 300 mg capsule Take 1 capsule (300 mg) by mouth once daily at bedtime. 90 capsule 1    icosapent ethyL (Vascepa) 1 gram capsule Take 2 capsules (2 g) by mouth 2 times daily (morning and late afternoon). 360 capsule 3    insulin glargine (Basaglar KwikPen U-100 Insulin) 100 unit/mL (3 mL) pen Inject 25 units twice a day 45 mL 1    insulin lispro (HumaLOG KwikPen Insulin) 100 unit/mL injection Inject 10 units before breakfast, 30 units before lunch, 45 units before dinner.  units. 90 mL 1    levothyroxine (Synthroid, Levoxyl) 50 mcg tablet       magnesium chloride (Slow-Mag) 71.5 mg tablet,delayed release (DR/EC) Take 1 tablet (71.5 mg) by mouth 2 times a day.       multivitamin with minerals (multivit-min-iron fum-folic ac) tablet Take 1 tablet by mouth once daily.      omeprazole (PriLOSEC) 40 mg DR capsule       rosuvastatin (Crestor) 20 mg tablet Take 1 tablet (20 mg) by mouth once daily.      sertraline (Zoloft) 100 mg tablet Take by mouth once daily.      sodium bicarbonate 650 mg tablet Take 2 tablets (1,300 mg) by mouth 2 times a day. 120 tablet 11    traZODone (Desyrel) 50 mg tablet Take 1 tablet (50 mg) by mouth once daily at bedtime.       No current facility-administered medications for this visit.     <October>        Assessment and Plan  64 y.o. female with hx of type 2 diabetes, hypothyroidism, secondary adrenal insufficiency, HTN, HLD, ESRD on HD, Crohn's disease, recurrent pancreatitis, here for follow-up of diabetes.     1. Type 2 diabetes mellitus  2. Hypertriglyceridemia  HbA1c: 6.9% (09/2024),  6.7% (02/2024), 6.8% (6/7/2023), 6.3% (3/3/2023), 8.1% (11/21/2022)  Fructosamine: 377 umol/L = A1c 8.0% (02/2024)  Current regimen:  Basaglar 35 units BID, Humalog 10-30-45 units TIDAC+SS#2  Eye exam: + DR (01/2024)  Urine microalbumin: 380 micrograms/mg (11/2023) ; managed by nephrology  Lipids: HDL 34, non-,  (02/2024) on rosuvastatin 20 mg QDAY, fenofibrate 145 mg QDAY, Vascepa 2 g BID  *Avoid GLP-1 RA due to hx of recurrent pancreatitis*    A1c inaccurate due to anemia.  No recent Mandie readings available. Download from October reviewed.  Her [employer] trina is no longer connected to our clinic for some reason.  Her trina prompts her to sign in to her [employer] account when I tried to reconnect her and she could not remember her password.  Will have her reset he password after the visit today.  I will send her instructions on how to connect to our clinic today so that I can review her recent readings and adjust her insulin.    Based on her October download, she does fairly well during the day but has significant post-prandial hyperglycemia after  dinner.  She takes correction with Humalog at bedtime if BG>250 (10 units if >250, 15-20 units if >300) which causes overnight hypoglycemia.  We need to improve her post-dinner hyperglycemia so that she does not need to take a bedtime correction. Will likely need to increase pre-dinner Humalog, but I would like to see her recent Mandie readings before I do this.  For now, I will have her reduce the amount of Humalog she takes for bedtime corrections to max of 15 units (if BG>300).    I will send her instruction for connecting the [facility] trina to the clinic via [employer]..    PLAN:  -continue Basaglar 35 units BID  -continue Humalog 10-30-45 units TIDAC+SS#2  -check blood sugars before every meal and bedtime (uses Mandie 3 with reader)  -due for eye exam (reminded patient to schedule)  -continue Vascepa 2g BID  -follow-up with Se Lei PharmD for closer monitoring  -check fructosamine, HbA1c, c-peptide, CMP    2. Hypothyroidism  3. Thyroid nodules  Current regimen: levothyroxine 50 mcg/day     Thyroid US (06/2024)  Right  -lower pole; 0.7 x 0.5 x 0.6 cm, spongiform (0.6 x 0.5 x 0.6 cm in 08/2023)  Left:  -upper pole; 0.7  x 0.5 x 0.7 cm, hypoechoic, solid (0.7 x 0.5 x 0.7 cm in 08/2023)    Labs from 02/2024  TSH 4.77    Has not been taking 8 tabs/week as recommended in 02/2024.  She is taking taking 1 tab daily.  Will check TSH and adjust dose if needed.    PLAN:  -check TSH   -continue levothyroxine 50 mcg/day    3. Secondary adrenal insufficiency  4. Hx of chronic steroid use for Crohn's disease  Reported falls and a syncopal episode in July 2023.  She also reported lower baseline BP, lightheadedness, and dizziness.  Advised to have labs done to assess for AI but she never got this done.  Cortisol was checked by Dr. Hussein () on 12/7/2023.  Random cortisol (4 pm): 8.7     She has a hx of long-term steroid use for Crohn's disease (prednisone 5 mg QDAY x > 15 years) and apparently had stopped taking prednisone in July  2023.  When Dr. Hussein reached out to me in October 2023, I recommended resuming prednisone 5 mg QDAY.  It appears that she was resumed on prednisone 10 mg QDAY.   Reduced prednisone to 5 mg QDAY in 01/2024.     Labs from 02/2024 (off of prednisone for 2 days)  cortisol 25.6   ACTH 28.9    Taking prednisone 2.5 mg QAY.    PLAN:  -continue prednisone to 2.5 mg QDAY     Follow-up in 3-4 months (in-office)  Uses [employer].

## 2025-01-21 NOTE — Clinical Note
Hello, please schedule her for a follow up with me in 3-4 months (in-office) and with Se INIGUEZ in 2-3 weeks (can be virtual). Thank you!

## 2025-01-22 ENCOUNTER — APPOINTMENT (OUTPATIENT)
Dept: DIALYSIS | Facility: HOSPITAL | Age: 65
End: 2025-01-22
Payer: COMMERCIAL

## 2025-01-28 ENCOUNTER — APPOINTMENT (OUTPATIENT)
Dept: DIALYSIS | Facility: HOSPITAL | Age: 65
End: 2025-01-28
Payer: COMMERCIAL

## 2025-02-04 ENCOUNTER — APPOINTMENT (OUTPATIENT)
Dept: ENDOCRINOLOGY | Facility: CLINIC | Age: 65
End: 2025-02-04
Payer: COMMERCIAL

## 2025-02-12 ENCOUNTER — APPOINTMENT (OUTPATIENT)
Dept: DIALYSIS | Facility: HOSPITAL | Age: 65
End: 2025-02-12
Payer: COMMERCIAL

## 2025-02-20 ENCOUNTER — HOSPITAL ENCOUNTER (OUTPATIENT)
Dept: DIALYSIS | Facility: HOSPITAL | Age: 65
Setting detail: DIALYSIS SERIES
Discharge: HOME | End: 2025-02-20
Payer: COMMERCIAL

## 2025-02-26 ENCOUNTER — HOSPITAL ENCOUNTER (OUTPATIENT)
Dept: DIALYSIS | Facility: HOSPITAL | Age: 65
Setting detail: DIALYSIS SERIES
Discharge: HOME | End: 2025-02-26
Payer: COMMERCIAL

## 2025-02-26 VITALS
DIASTOLIC BLOOD PRESSURE: 85 MMHG | HEART RATE: 99 BPM | BODY MASS INDEX: 23.16 KG/M2 | TEMPERATURE: 96.8 F | SYSTOLIC BLOOD PRESSURE: 129 MMHG | WEIGHT: 141.31 LBS

## 2025-02-26 DIAGNOSIS — Z86.2 HISTORY OF ANEMIA DUE TO CHRONIC KIDNEY DISEASE: ICD-10-CM

## 2025-02-26 DIAGNOSIS — E20.9 HYPOPARATHYROIDISM, UNSPECIFIED HYPOPARATHYROIDISM TYPE (MULTI): Primary | ICD-10-CM

## 2025-02-26 DIAGNOSIS — N18.9 HISTORY OF ANEMIA DUE TO CHRONIC KIDNEY DISEASE: ICD-10-CM

## 2025-02-26 LAB
ALBUMIN SERPL BCP-MCNC: 3.9 G/DL (ref 3.4–5)
ANION GAP SERPL CALC-SCNC: 15 MMOL/L (ref 10–20)
BUN SERPL-MCNC: 34 MG/DL (ref 6–23)
CALCIUM SERPL-MCNC: 9.6 MG/DL (ref 8.6–10.6)
CHLORIDE SERPL-SCNC: 105 MMOL/L (ref 98–107)
CO2 SERPL-SCNC: 24 MMOL/L (ref 21–32)
CREAT SERPL-MCNC: 2.25 MG/DL (ref 0.5–1.05)
EGFRCR SERPLBLD CKD-EPI 2021: 24 ML/MIN/1.73M*2
ERYTHROCYTE [DISTWIDTH] IN BLOOD BY AUTOMATED COUNT: 13.1 % (ref 11.5–14.5)
FERRITIN SERPL-MCNC: 960 NG/ML (ref 8–150)
GLUCOSE SERPL-MCNC: 125 MG/DL (ref 74–99)
HCT VFR BLD AUTO: 33.7 % (ref 36–46)
HGB BLD-MCNC: 11 G/DL (ref 12–16)
IRON SATN MFR SERPL: 22 % (ref 25–45)
IRON SERPL-MCNC: 87 UG/DL (ref 35–150)
MCH RBC QN AUTO: 30.4 PG (ref 26–34)
MCHC RBC AUTO-ENTMCNC: 32.6 G/DL (ref 32–36)
MCV RBC AUTO: 93 FL (ref 80–100)
NRBC BLD-RTO: 0 /100 WBCS (ref 0–0)
PHOSPHATE SERPL-MCNC: 3.7 MG/DL (ref 2.5–4.9)
PLATELET # BLD AUTO: 250 X10*3/UL (ref 150–450)
POTASSIUM SERPL-SCNC: 4.3 MMOL/L (ref 3.5–5.3)
RBC # BLD AUTO: 3.62 X10*6/UL (ref 4–5.2)
SODIUM SERPL-SCNC: 140 MMOL/L (ref 136–145)
TIBC SERPL-MCNC: 399 UG/DL (ref 240–445)
UIBC SERPL-MCNC: 312 UG/DL (ref 110–370)
WBC # BLD AUTO: 9.5 X10*3/UL (ref 4.4–11.3)

## 2025-02-26 PROCEDURE — 84100 ASSAY OF PHOSPHORUS: CPT | Performed by: INTERNAL MEDICINE

## 2025-02-26 PROCEDURE — 96372 THER/PROPH/DIAG INJ SC/IM: CPT | Performed by: INTERNAL MEDICINE

## 2025-02-26 PROCEDURE — 85027 COMPLETE CBC AUTOMATED: CPT | Performed by: INTERNAL MEDICINE

## 2025-02-26 PROCEDURE — 6350000001 HC RX 635 EPOETIN >10,000 UNITS: Mod: JZ,TB | Performed by: INTERNAL MEDICINE

## 2025-02-26 PROCEDURE — 82728 ASSAY OF FERRITIN: CPT | Performed by: INTERNAL MEDICINE

## 2025-02-26 PROCEDURE — 36415 COLL VENOUS BLD VENIPUNCTURE: CPT | Performed by: INTERNAL MEDICINE

## 2025-02-26 PROCEDURE — 83540 ASSAY OF IRON: CPT | Performed by: INTERNAL MEDICINE

## 2025-02-26 PROCEDURE — 36415 COLL VENOUS BLD VENIPUNCTURE: CPT

## 2025-02-26 RX ORDER — DIPHENHYDRAMINE HYDROCHLORIDE 50 MG/ML
50 INJECTION INTRAMUSCULAR; INTRAVENOUS AS NEEDED
OUTPATIENT
Start: 2025-03-13

## 2025-02-26 RX ADMIN — ERYTHROPOIETIN 80000 UNITS: 40000 INJECTION, SOLUTION INTRAVENOUS; SUBCUTANEOUS at 15:57

## 2025-02-26 NOTE — PROGRESS NOTES
Agnes M Reyes  MRN [id number]  02/26/25  3:36 PM    Reason for Visit: Injections (Retacrit 80,000 units)      Vitals:    02/26/25 1529   BP: 129/85   BP Location: Left arm   Patient Position: Sitting   BP Cuff Size: Adult   Pulse: 99   Temp: 36 °C (96.8 °F)   TempSrc: Tympanic   Weight: 64.1 kg (141 lb 5 oz)        How is your energy? No energy at all.  How is your appetite? It's been okay.  Any nausea and vomiting after eating? no  Any diarrhea or constipation? Frequent diarrhea (crohn's disease)  Any SOB? Sometimes, with exertion  Any Edema in lower extremities? none  Have you had any recent infections? no  Have you been on antibiotics recently? no  Any questions regarding your Kidney disease? no  DO you have a Dialysis accesses? no    Hemoglobin   Date/Time Value Ref Range Status   09/11/2024 12:11 PM 10.1 (L) 12.0 - 16.0 g/dL Final   09/04/2024 10:28 AM 10.2 (L) 12.0 - 16.0 g/dL Final     Ferritin   Date/Time Value Ref Range Status   09/11/2024 12:11  (H) 8 - 150 ng/mL Final     % Saturation   Date/Time Value Ref Range Status   09/11/2024 12:11 PM 21 (L) 25 - 45 % Final        Medications Given in Clinic: Retacrit 80,000 units subcutaneous injection;      Nursing notes:  Labs drawn (CBC, RFP, iron studies). Pt received her Retacrit injection subcutaneously to the back of her right arm. No complications encountered.       RN: Vasyl Cleary RN     02/26/25

## 2025-02-27 NOTE — ADDENDUM NOTE
Encounter addended by: Silvina Thompson RN on: 2/27/2025 8:33 AM   Actions taken: Clinical Note Signed

## 2025-03-12 DIAGNOSIS — B39.9 DISSEMINATED HISTOPLASMOSIS: Primary | ICD-10-CM

## 2025-03-12 RX ORDER — FLUCONAZOLE 200 MG/1
200 TABLET ORAL DAILY
Qty: 90 TABLET | Refills: 3 | Status: SHIPPED | OUTPATIENT
Start: 2025-03-12 | End: 2026-03-12

## 2025-03-26 ENCOUNTER — APPOINTMENT (OUTPATIENT)
Dept: DIALYSIS | Facility: HOSPITAL | Age: 65
End: 2025-03-26
Payer: COMMERCIAL

## 2025-04-04 ENCOUNTER — APPOINTMENT (OUTPATIENT)
Dept: DIALYSIS | Facility: HOSPITAL | Age: 65
End: 2025-04-04
Payer: COMMERCIAL

## 2025-04-07 ENCOUNTER — HOSPITAL ENCOUNTER (OUTPATIENT)
Dept: DIALYSIS | Facility: HOSPITAL | Age: 65
Setting detail: DIALYSIS SERIES
Discharge: HOME | End: 2025-04-07
Payer: COMMERCIAL

## 2025-04-07 VITALS
DIASTOLIC BLOOD PRESSURE: 80 MMHG | HEART RATE: 94 BPM | BODY MASS INDEX: 24.21 KG/M2 | WEIGHT: 147.71 LBS | SYSTOLIC BLOOD PRESSURE: 112 MMHG | TEMPERATURE: 96.4 F

## 2025-04-07 DIAGNOSIS — E20.9 HYPOPARATHYROIDISM, UNSPECIFIED HYPOPARATHYROIDISM TYPE (MULTI): Primary | ICD-10-CM

## 2025-04-07 DIAGNOSIS — N18.9 HISTORY OF ANEMIA DUE TO CHRONIC KIDNEY DISEASE: ICD-10-CM

## 2025-04-07 DIAGNOSIS — Z86.2 HISTORY OF ANEMIA DUE TO CHRONIC KIDNEY DISEASE: ICD-10-CM

## 2025-04-07 LAB
ALBUMIN SERPL BCP-MCNC: 3.6 G/DL (ref 3.4–5)
ANION GAP SERPL CALC-SCNC: 16 MMOL/L (ref 10–20)
BUN SERPL-MCNC: 45 MG/DL (ref 6–23)
CALCIUM SERPL-MCNC: 9 MG/DL (ref 8.6–10.6)
CHLORIDE SERPL-SCNC: 107 MMOL/L (ref 98–107)
CO2 SERPL-SCNC: 20 MMOL/L (ref 21–32)
CREAT SERPL-MCNC: 2.23 MG/DL (ref 0.5–1.05)
EGFRCR SERPLBLD CKD-EPI 2021: 24 ML/MIN/1.73M*2
ERYTHROCYTE [DISTWIDTH] IN BLOOD BY AUTOMATED COUNT: 13.3 % (ref 11.5–14.5)
GLUCOSE SERPL-MCNC: 157 MG/DL (ref 74–99)
HCT VFR BLD AUTO: 33.9 % (ref 36–46)
HGB BLD-MCNC: 10.6 G/DL (ref 12–16)
MCH RBC QN AUTO: 30.6 PG (ref 26–34)
MCHC RBC AUTO-ENTMCNC: 31.3 G/DL (ref 32–36)
MCV RBC AUTO: 98 FL (ref 80–100)
NRBC BLD-RTO: 0 /100 WBCS (ref 0–0)
PHOSPHATE SERPL-MCNC: 4.5 MG/DL (ref 2.5–4.9)
PLATELET # BLD AUTO: 175 X10*3/UL (ref 150–450)
POTASSIUM SERPL-SCNC: 5.7 MMOL/L (ref 3.5–5.3)
PTH-INTACT SERPL-MCNC: 14.7 PG/ML (ref 18.5–88)
RBC # BLD AUTO: 3.46 X10*6/UL (ref 4–5.2)
SODIUM SERPL-SCNC: 137 MMOL/L (ref 136–145)
WBC # BLD AUTO: 8.2 X10*3/UL (ref 4.4–11.3)

## 2025-04-07 PROCEDURE — 80069 RENAL FUNCTION PANEL: CPT | Performed by: INTERNAL MEDICINE

## 2025-04-07 PROCEDURE — 6350000001 HC RX 635 EPOETIN >10,000 UNITS: Mod: JZ,TB | Performed by: INTERNAL MEDICINE

## 2025-04-07 PROCEDURE — 36415 COLL VENOUS BLD VENIPUNCTURE: CPT | Performed by: INTERNAL MEDICINE

## 2025-04-07 PROCEDURE — 83970 ASSAY OF PARATHORMONE: CPT | Performed by: INTERNAL MEDICINE

## 2025-04-07 PROCEDURE — 85027 COMPLETE CBC AUTOMATED: CPT | Performed by: INTERNAL MEDICINE

## 2025-04-07 PROCEDURE — 96372 THER/PROPH/DIAG INJ SC/IM: CPT | Performed by: INTERNAL MEDICINE

## 2025-04-07 RX ORDER — DIPHENHYDRAMINE HYDROCHLORIDE 50 MG/ML
50 INJECTION, SOLUTION INTRAMUSCULAR; INTRAVENOUS AS NEEDED
OUTPATIENT
Start: 2025-04-23

## 2025-04-07 RX ADMIN — ERYTHROPOIETIN 40000 UNITS: 40000 INJECTION, SOLUTION INTRAVENOUS; SUBCUTANEOUS at 14:59

## 2025-04-07 NOTE — PROGRESS NOTES
Agnes M Reyes  MRN [id number]  04/07/25  2:38 PM    Reason for Visit: Injections (Retacrit 40,000 units)      Vitals:    04/07/25 1427 04/07/25 1437   BP: 126/84 112/80   BP Location: Left arm Left arm   Patient Position: Sitting Standing   BP Cuff Size: Adult Adult   Pulse: 90 94   Temp: 35.8 °C (96.4 °F)    TempSrc: Tympanic    Weight: 67 kg (147 lb 11.3 oz)         How is your energy? bad  How is your appetite? Not so great  Any nausea and vomiting after eating? no  Any diarrhea or constipation? Diarrhea r/t crohn's disease (per pt)  Any SOB? No, only with too much walking.   Any Edema in lower extremities? none  Have you had any recent infections? no  Have you been on antibiotics recently? no  Any questions regarding your Kidney disease? no  DO you have a Dialysis accesses? no    Hemoglobin   Date/Time Value Ref Range Status   02/26/2025 03:50 PM 11.0 (L) 12.0 - 16.0 g/dL Final   09/11/2024 12:11 PM 10.1 (L) 12.0 - 16.0 g/dL Final     Ferritin   Date/Time Value Ref Range Status   02/26/2025 03:50  (H) 8 - 150 ng/mL Final     % Saturation   Date/Time Value Ref Range Status   02/26/2025 03:50 PM 22 (L) 25 - 45 % Final        Medications Given in Clinic:   epoetin maye, 40,000 Units, subcutaneous, Once      Nursing notes:  Ck labs (CBC, RFP, PTH). Pt received her epoetin subcutaneous to back of left upper arm. No complications encountered.       RN: Vasyl Cleary RN     04/07/25

## 2025-04-15 ENCOUNTER — APPOINTMENT (OUTPATIENT)
Dept: INFECTIOUS DISEASES | Facility: CLINIC | Age: 65
End: 2025-04-15
Payer: COMMERCIAL

## 2025-04-18 ENCOUNTER — OFFICE VISIT (OUTPATIENT)
Dept: INFECTIOUS DISEASES | Facility: CLINIC | Age: 65
End: 2025-04-18
Payer: COMMERCIAL

## 2025-04-18 VITALS
WEIGHT: 146 LBS | SYSTOLIC BLOOD PRESSURE: 117 MMHG | HEIGHT: 66 IN | HEART RATE: 88 BPM | DIASTOLIC BLOOD PRESSURE: 80 MMHG | BODY MASS INDEX: 23.46 KG/M2 | TEMPERATURE: 97.6 F

## 2025-04-18 DIAGNOSIS — E11.43 TYPE 2 DIABETES MELLITUS WITH DIABETIC AUTONOMIC NEUROPATHY, WITH LONG-TERM CURRENT USE OF INSULIN: ICD-10-CM

## 2025-04-18 DIAGNOSIS — R39.15 URGENCY OF URINATION: ICD-10-CM

## 2025-04-18 DIAGNOSIS — B39.9 DISSEMINATED HISTOPLASMOSIS: Primary | ICD-10-CM

## 2025-04-18 DIAGNOSIS — R30.0 DYSURIA: ICD-10-CM

## 2025-04-18 DIAGNOSIS — Z79.4 TYPE 2 DIABETES MELLITUS WITH DIABETIC AUTONOMIC NEUROPATHY, WITH LONG-TERM CURRENT USE OF INSULIN: ICD-10-CM

## 2025-04-18 DIAGNOSIS — R35.0 FREQUENCY OF URINATION: ICD-10-CM

## 2025-04-18 DIAGNOSIS — R39.9 UTI SYMPTOMS: ICD-10-CM

## 2025-04-18 PROCEDURE — 3074F SYST BP LT 130 MM HG: CPT | Performed by: INTERNAL MEDICINE

## 2025-04-18 PROCEDURE — 1159F MED LIST DOCD IN RCRD: CPT | Performed by: INTERNAL MEDICINE

## 2025-04-18 PROCEDURE — 99215 OFFICE O/P EST HI 40 MIN: CPT | Performed by: INTERNAL MEDICINE

## 2025-04-18 PROCEDURE — 3008F BODY MASS INDEX DOCD: CPT | Performed by: INTERNAL MEDICINE

## 2025-04-18 PROCEDURE — 3079F DIAST BP 80-89 MM HG: CPT | Performed by: INTERNAL MEDICINE

## 2025-04-18 PROCEDURE — 1036F TOBACCO NON-USER: CPT | Performed by: INTERNAL MEDICINE

## 2025-04-18 PROCEDURE — 1160F RVW MEDS BY RX/DR IN RCRD: CPT | Performed by: INTERNAL MEDICINE

## 2025-04-18 RX ORDER — AMOXICILLIN AND CLAVULANATE POTASSIUM 500; 125 MG/1; MG/1
1 TABLET, FILM COATED ORAL 2 TIMES DAILY
Qty: 20 TABLET | Refills: 0 | Status: SHIPPED | OUTPATIENT
Start: 2025-04-18 | End: 2025-04-28

## 2025-04-18 RX ORDER — GABAPENTIN 300 MG/1
CAPSULE ORAL
Qty: 90 CAPSULE | Refills: 1 | Status: SHIPPED | OUTPATIENT
Start: 2025-04-18

## 2025-04-18 ASSESSMENT — ENCOUNTER SYMPTOMS
DEPRESSION: 0
LOSS OF SENSATION IN FEET: 0
OCCASIONAL FEELINGS OF UNSTEADINESS: 0

## 2025-04-18 ASSESSMENT — PATIENT HEALTH QUESTIONNAIRE - PHQ9
2. FEELING DOWN, DEPRESSED OR HOPELESS: MORE THAN HALF THE DAYS
1. LITTLE INTEREST OR PLEASURE IN DOING THINGS: MORE THAN HALF THE DAYS
SUM OF ALL RESPONSES TO PHQ9 QUESTIONS 1 & 2: 4

## 2025-04-18 NOTE — LETTER
04/18/25    Luisa Kennedy DO  [address] [facility] [address]      Dear Dr. Luisa Kennedy DO,    I am writing to confirm that your patient, Agnes M Reyes, received care in my office on 04/18/25. I have enclosed a summary of the care provided to Nighat for your reference.    Please contact me with any questions you may have regarding the visit.    Sincerely,         Simin Silverio MD  [id number]  [address] [address]    CC: No Recipients

## 2025-04-18 NOTE — PATIENT INSTRUCTIONS
It was great to see you today. You have been missed!  We will send off your urine but while we wait, start the Amoxicillin-clavulanate as the past isolates have been sensitive to this.  If you do not feel better, call in to the answering service at [phone] and they should reach out to me directly.    I will ask my friends in [place] for a referral in the [facility] area.

## 2025-04-18 NOTE — ASSESSMENT & PLAN NOTE
She has active prescription for fluconazole 200mg daily as chronic histoplasmosis suppression/ppx (in setting of CKD, dose is equal to 400mg daily) through March 31, 2026.

## 2025-04-19 DIAGNOSIS — F32.89 OTHER DEPRESSION: ICD-10-CM

## 2025-04-19 DIAGNOSIS — E78.2 MIXED HYPERLIPIDEMIA: ICD-10-CM

## 2025-04-19 DIAGNOSIS — F51.01 PRIMARY INSOMNIA: ICD-10-CM

## 2025-04-20 LAB
APPEARANCE UR: CLEAR
BACTERIA #/AREA URNS HPF: ABNORMAL /HPF
BACTERIA UR CULT: ABNORMAL
BACTERIA UR CULT: ABNORMAL
BILIRUB UR QL STRIP: NEGATIVE
COLOR UR: YELLOW
GLUCOSE UR QL STRIP: NEGATIVE
HGB UR QL STRIP: ABNORMAL
HYALINE CASTS #/AREA URNS LPF: ABNORMAL /LPF
KETONES UR QL STRIP: NEGATIVE
LEUKOCYTE ESTERASE UR QL STRIP: ABNORMAL
NITRITE UR QL STRIP: POSITIVE
PH UR STRIP: 6 [PH] (ref 5–8)
PROT UR QL STRIP: ABNORMAL
RBC #/AREA URNS HPF: ABNORMAL /HPF
SERVICE CMNT-IMP: ABNORMAL
SP GR UR STRIP: 1.02 (ref 1–1.03)
SQUAMOUS #/AREA URNS HPF: ABNORMAL /HPF
TRANS CELLS #/AREA URNS HPF: ABNORMAL /HPF
WBC #/AREA URNS HPF: ABNORMAL /HPF

## 2025-04-21 RX ORDER — OMEPRAZOLE 40 MG/1
40 CAPSULE, DELAYED RELEASE ORAL DAILY
Qty: 90 CAPSULE | Refills: 1 | OUTPATIENT
Start: 2025-04-21

## 2025-04-21 RX ORDER — SERTRALINE HYDROCHLORIDE 100 MG/1
100 TABLET, FILM COATED ORAL DAILY
Qty: 90 TABLET | Refills: 1 | OUTPATIENT
Start: 2025-04-21

## 2025-04-21 RX ORDER — ROSUVASTATIN CALCIUM 20 MG/1
20 TABLET, COATED ORAL EVERY MORNING
Qty: 90 TABLET | Refills: 1 | OUTPATIENT
Start: 2025-04-21

## 2025-04-21 RX ORDER — TRAZODONE HYDROCHLORIDE 100 MG/1
100 TABLET ORAL NIGHTLY
Qty: 90 TABLET | Refills: 1 | OUTPATIENT
Start: 2025-04-21

## 2025-04-21 RX ORDER — FENOFIBRATE 145 MG/1
145 TABLET, FILM COATED ORAL DAILY
Qty: 90 TABLET | Refills: 1 | OUTPATIENT
Start: 2025-04-21

## 2025-04-21 NOTE — PROGRESS NOTES
REASON FOR VISIT:  Crohn's disease    HPI:  Agnes M Reyes is a 65 y.o. female who presents for follow-up.  Last seen 10/2023.  Crohn's disease s/p subtotal colectomy with ileorectal anastomosis 2007.     2008 mild anastomostic dz, started Humira.; complete healing with normal F/S in 2012 and 2016 (sigmoid diverticulosis).    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  Path focal acute ileitis.  Stopped Humira.    [id number] hospitalized with 1-week of n/v, generalized weakness, decreased PO intake and fevers (max 102.8) and two days of epigastric pain. CT A/P (non-contrast given severe anaphylaxis allergy to contrast) showed mild enlargement and edema of the pancreatic head and uncinated process with subtle surrounding stranding likely representing acute edematous interstitial pancreatitis.     Last seen 10/2023 and had been falling and losing weight.  She had run out of prednisone and this was felt to contribute to her symptoms at the time.  Improved with re-institution of prednisone.      REVIEW OF SYSTEMS    Allergies[1]    Medical History[2]    Surgical History[3]    Current Medications[4]    PHYSICAL EXAM:  There were no vitals taken for this visit.     Lab Results   Component Value Date    WBC 8.2 04/07/2025    HGB 10.6 (L) 04/07/2025    HCT 33.9 (L) 04/07/2025    MCV 98 04/07/2025     04/07/2025     Lab Results   Component Value Date    ALT 21 07/15/2023    AST 33 07/15/2023    ALKPHOS 72 07/15/2023    BILITOT 0.6 07/15/2023       === 03/08/23 ===    CT CHEST WO IV CONTRAST    - Impression -  1.  Clusters of ground-glass nodules within the right lower lobe  measuring up to 0.4 cm. Given patient's  history, findings may be  infectious/inflammatory in nature. Otherwise, no acute  cardiopulmonary process.  2. Additional solid pulmonary nodules within the bilateral lungs  measuring up to 0.6 cm.  If patient has risk factors for lung cancer,  a follow-up chest CT in 1 year is considered optional per 2017  Fleischner Society guidelines. Otherwise, in the absence of risk  factors, no routine follow-up is indicated.  3. Numerous calcified mediastinal and hilar lymph nodes likely  reflecting sequela of prior granulomatous infection.  4.  Moderate coronary artery calcifications, indicating the presence  of coronary artery disease. If the patient has associated symptoms  recommend management as per chest pain guidelines (e.g.  https://doi.org/10.1161/CIR.5336964100006016). If the patient is  asymptomatic consider reviewing modifiable cardiovascular risk  factors and managing as per guidelines for primary prevention (e.g.  https://doi.org/10.1161/CIR.9608080865738587)      2017 FLEISCHNER SOCIETY GUIDELINES FOR INCIDENTAL NODULES    - Guidelines apply to patients > 35 years-old without history of  malignancy or immunocompromise.  - Guidelines apply to a nodule incidentally detected on baseline  examination, NOT A NEW NODULE that was not present on a prior exam.  - Dimensions are average of long and short axis, rounded to the  nearest millimeter.  - Consider all relevant risk factors.  - The minimum threshold size for recommending follow-up is based on  an estimated cancer risk in a nodule of >/= 1%.    SINGLE NODULE:  Low Risk:  < 6 mm No routine follow-up  High risk:  < 6 mm Optional CT at 12 months    MULTIPLE NODULES:  Low risk:  < 6 mm No routine follow-up  High risk:  < 6 mm Optional CT at 12 months      ASSESSMENT  #Crohn's disease:     PLAN  ***         [1]   Allergies  Allergen Reactions    Dye Anaphylaxis     IV Contrast Dye    Iodides Anaphylaxis    Iodinated Contrast Media Anaphylaxis     STOPPED HEART     Toradol [Ketorolac] Shortness of breath and Swelling    Adhesive Tape-Silicones Other     Blisters   [2]   Past Medical History:  Diagnosis Date    Combined forms of age-related cataract, bilateral 08/01/2019    Combined form of senile cataract of both eyes    Combined forms of age-related cataract, right eye 08/15/2019    Combined form of senile cataract of right eye    Histoplasmosis, unspecified 08/19/2022    Disseminated histoplasmosis    Other conditions influencing health status 01/28/2022    Uncontrolled insulin dependent diabetes mellitus    Other muscle spasm 10/14/2015    Muscle spasm    Personal history of other diseases of the digestive system 04/03/2014    History of constipation    Personal history of other diseases of the digestive system 10/20/2015    History of acute pancreatitis    Personal history of other specified conditions 04/12/2016    History of dizziness    Personal history of other specified conditions 10/20/2015    History of abnormal weight loss    Personal history of urinary (tract) infections 08/16/2019    History of urinary tract infection    Presence of intraocular lens 08/08/2019    Pseudophakia of left eye    Unspecified pre-eclampsia, unspecified trimester ([facility])     Toxemia of pregnancy   [3]   Past Surgical History:  Procedure Laterality Date    CHOLECYSTECTOMY  04/09/2015    Cholecystectomy    COLECTOMY  04/09/2015    Partial Colectomy    TUBAL LIGATION  04/09/2015    Tubal Ligation   [4]   Current Outpatient Medications   Medication Sig Dispense Refill    acetaminophen (Tylenol) 325 mg tablet Take 2 tablets (650 mg) by mouth every 4 hours if needed.      amoxicillin-clavulanate (Augmentin) 500-125 mg tablet Take 1 tablet by mouth 2 times a day for 10 days. 20 tablet 0    blood-glucose sensor (FreeStyle Mandie 3 Sensor) device CHANGE SENSOR EVERY 14 DAYS 6 each 1    cholecalciferol (Vitamin D-3) 50 mcg (2,000 unit) capsule Take 1 capsule (2,000 Units) by mouth once daily.       fenofibrate (Tricor) 145 mg tablet Take 1 tablet (145 mg) by mouth once daily.      fluconazole (Diflucan) 200 mg tablet Take 1 tablet (200 mg) by mouth once daily. 90 tablet 3    gabapentin (Neurontin) 300 mg capsule TAKE 1 CAPSULE(300 MG) BY MOUTH DAILY AT BEDTIME 90 capsule 1    icosapent ethyL (Vascepa) 1 gram capsule Take 2 capsules (2 g) by mouth 2 times a day. 360 capsule 3    insulin glargine (Basaglar KwikPen U-100 Insulin) 100 unit/mL (3 mL) pen Inject 35 units twice a day 75 mL 1    insulin lispro (HumaLOG KwikPen Insulin) 100 unit/mL injection Inject 10 units before breakfast, 30 units before lunch, 45 units before dinner.  units. 90 mL 1    levothyroxine (Synthroid, Levoxyl) 50 mcg tablet Take 1 tablet daily (first thing in the morning on an empty stomach with water. Wait 30 mins before eating/drinking/taking other meds. Wait 4 hours before taking calcium/iron/multivitamins) 90 tablet 3    magnesium chloride (Slow-Mag) 71.5 mg tablet,delayed release (DR/EC) Take 1 tablet (71.5 mg) by mouth 2 times a day.      multivitamin with minerals (multivit-min-iron fum-folic ac) tablet Take 1 tablet by mouth once daily.      omeprazole (PriLOSEC) 40 mg DR capsule       predniSONE (Deltasone) 5 mg tablet Take 0.5 tablets (2.5 mg) by mouth once daily. 45 tablet 3    rosuvastatin (Crestor) 20 mg tablet Take 1 tablet (20 mg) by mouth once daily.      sertraline (Zoloft) 100 mg tablet Take by mouth once daily.      sodium bicarbonate 650 mg tablet Take 2 tablets (1,300 mg) by mouth 2 times a day. 120 tablet 11    traZODone (Desyrel) 50 mg tablet Take 1 tablet (50 mg) by mouth once daily at bedtime.       No current facility-administered medications for this visit.

## 2025-04-22 ENCOUNTER — APPOINTMENT (OUTPATIENT)
Dept: GASTROENTEROLOGY | Facility: HOSPITAL | Age: 65
End: 2025-04-22
Payer: COMMERCIAL

## 2025-04-22 DIAGNOSIS — R39.9 UTI SYMPTOMS: Primary | ICD-10-CM

## 2025-04-22 RX ORDER — SULFAMETHOXAZOLE AND TRIMETHOPRIM 400; 80 MG/1; MG/1
1 TABLET ORAL 2 TIMES DAILY
Qty: 10 TABLET | Refills: 0 | Status: SHIPPED | OUTPATIENT
Start: 2025-04-22 | End: 2025-04-27

## 2025-04-23 ENCOUNTER — APPOINTMENT (OUTPATIENT)
Dept: DIALYSIS | Facility: HOSPITAL | Age: 65
End: 2025-04-23
Payer: COMMERCIAL

## 2025-04-23 NOTE — PROGRESS NOTES
FUV for diabetes. LV with me 01/2025.    History of Present Illness   65 y.o. female with hx of type 2 diabetes, hypothyroidism, HTN, HLD, ESRD on HD, Crohn's disease, recurrent pancreatitis.     HbA1c: 6.6% (04/2025), 6.9% (09/2024), 6.7% (02/2024), 6.8% (6/7/2023), 6.3% (03/03/2023), 8.1% (11/21/2022)  Current regimen: Basaglar 35 units BID, Humalog 10-30-45 units TIDAC+SS#2  Complications: retinopathy, nephropathy, neuropathy  Comorbidities: HTN, HLD, CKD, recurrent pancreatitis, recurrent UTI     SMBG: Mandie 3 with trina     Hypoglycemia awareness: yes     Diet: low-carb diet  Breakfast and lunch as small meals.  Dinner is her main meal.  Latest meal can be around 9 pm.   If she eats earlier (5-6 pm), she may have a snack later.    Today:  -doing ok    ROS  General: no fever or chills  CV: no chest pain   Respiratory: no shortness of breath  MSK: no lower extremity edema  Neuro: no headache or dizziness  See HPI for Endocrine ROS    Past Medical History:   Diagnosis Date    Combined forms of age-related cataract, bilateral 08/01/2019    Combined form of senile cataract of both eyes    Combined forms of age-related cataract, right eye 08/15/2019    Combined form of senile cataract of right eye    Histoplasmosis, unspecified 08/19/2022    Disseminated histoplasmosis    Other conditions influencing health status 01/28/2022    Uncontrolled insulin dependent diabetes mellitus    Other muscle spasm 10/14/2015    Muscle spasm    Personal history of other diseases of the digestive system 04/03/2014    History of constipation    Personal history of other diseases of the digestive system 10/20/2015    History of acute pancreatitis    Personal history of other specified conditions 04/12/2016    History of dizziness    Personal history of other specified conditions 10/20/2015    History of abnormal weight loss    Personal history of urinary (tract) infections 08/16/2019    History of urinary tract infection    Presence of  intraocular lens 2019    Pseudophakia of left eye    Unspecified pre-eclampsia, unspecified trimester ([facility])     Toxemia of pregnancy       Past Surgical History:   Procedure Laterality Date    CHOLECYSTECTOMY  2015    Cholecystectomy    COLECTOMY  2015    Partial Colectomy    TUBAL LIGATION  2015    Tubal Ligation       Social History     Socioeconomic History    Marital status:      Spouse name: Not on file    Number of children: Not on file    Years of education: Not on file    Highest education level: Not on file   Occupational History    Not on file   Tobacco Use    Smoking status: Former     Current packs/day: 0.00     Types: Cigarettes     Quit date:      Years since quittin.3     Passive exposure: Past    Smokeless tobacco: Never   Vaping Use    Vaping status: Never Used   Substance and Sexual Activity    Alcohol use: Never    Drug use: Never    Sexual activity: Not on file   Other Topics Concern    Not on file   Social History Narrative    Not on file     Social Drivers of Health     Financial Resource Strain: Low Risk  (2023)    Received from [facility]    Overall Financial Resource Strain (CARDIA)     Difficulty of Paying Living Expenses: Not hard at all   Food Insecurity: No Food Insecurity (10/31/2023)    Hunger Vital Sign     Worried About Running Out of Food in the Last Year: Never true     Ran Out of Food in the Last Year: Never true   Transportation Needs: Unknown (2023)    Received from [facility]    PRAPARE - Transportation     Lack of Transportation (Medical): Not on file     Lack of Transportation (Non-Medical): No   Physical Activity: Not on file   Stress: Not on file   Social Connections: Not on file   Intimate Partner Violence: Not on file   Housing Stability: Unknown (2023)    Received from [facility]    Housing Stability Vital Sign     Unable to Pay for Housing in the  "Last Year: Not on file     Number of Places Lived in the Last Year: Not on file     Unstable Housing in the Last Year: No       Physical Exam   height is 1.664 m (5' 5.5\") and weight is 64.4 kg (142 lb). Her blood pressure is 115/76 and her pulse is 86.   General: not in acute distress  Neuro: alert and oriented x 3    Current Outpatient Medications   Medication Sig Dispense Refill    acetaminophen (Tylenol) 325 mg tablet Take 2 tablets (650 mg) by mouth every 4 hours if needed.      amoxicillin-clavulanate (Augmentin) 500-125 mg tablet Take 1 tablet by mouth 2 times a day for 10 days. 20 tablet 0    blood-glucose sensor (FreeStyle Mandie 3 Sensor) device CHANGE SENSOR EVERY 14 DAYS 6 each 1    cholecalciferol (Vitamin D-3) 50 mcg (2,000 unit) capsule Take 1 capsule (2,000 Units) by mouth once daily.      fenofibrate (Tricor) 145 mg tablet Take 1 tablet (145 mg) by mouth once daily.      fluconazole (Diflucan) 200 mg tablet Take 1 tablet (200 mg) by mouth once daily. 90 tablet 3    gabapentin (Neurontin) 300 mg capsule TAKE 1 CAPSULE(300 MG) BY MOUTH DAILY AT BEDTIME 90 capsule 1    icosapent ethyL (Vascepa) 1 gram capsule Take 2 capsules (2 g) by mouth 2 times a day. 360 capsule 3    insulin glargine (Basaglar KwikPen U-100 Insulin) 100 unit/mL (3 mL) pen Inject 35 units twice a day 75 mL 1    insulin lispro (HumaLOG KwikPen Insulin) 100 unit/mL injection Inject 10 units before breakfast, 30 units before lunch, 45 units before dinner.  units. 90 mL 1    levothyroxine (Synthroid, Levoxyl) 50 mcg tablet Take 1 tablet daily (first thing in the morning on an empty stomach with water. Wait 30 mins before eating/drinking/taking other meds. Wait 4 hours before taking calcium/iron/multivitamins) 90 tablet 3    magnesium chloride (Slow-Mag) 71.5 mg tablet,delayed release (DR/EC) Take 1 tablet (71.5 mg) by mouth 2 times a day.      multivitamin with minerals (multivit-min-iron fum-folic ac) tablet Take 1 tablet by " mouth once daily.      omeprazole (PriLOSEC) 40 mg DR capsule       predniSONE (Deltasone) 5 mg tablet Take 0.5 tablets (2.5 mg) by mouth once daily. 45 tablet 3    rosuvastatin (Crestor) 20 mg tablet Take 1 tablet (20 mg) by mouth once daily.      sertraline (Zoloft) 100 mg tablet Take by mouth once daily.      sodium bicarbonate 650 mg tablet Take 2 tablets (1,300 mg) by mouth 2 times a day. 120 tablet 11    traZODone (Desyrel) 50 mg tablet Take 1 tablet (50 mg) by mouth once daily at bedtime.       No current facility-administered medications for this visit.     <October>        Assessment and Plan  65 y.o. female with hx of type 2 diabetes, hypothyroidism, secondary adrenal insufficiency, HTN, HLD, ESRD on HD, Crohn's disease, recurrent pancreatitis, here for follow-up of diabetes.     1. Type 2 diabetes mellitus  2. Hypertriglyceridemia  HbA1c: 6.6% (04/2025), 6.9% (09/2024),  6.7% (02/2024), 6.8% (6/7/2023), 6.3% (3/3/2023), 8.1% (11/21/2022)  Fructosamine: 377 umol/L = A1c 8.0% (02/2024)  Current regimen:  Basaglar 35 units BID, Humalog 10-30-45 units TIDAC+SS#2  Eye exam: + DR (01/2024)  Urine microalbumin: 380 micrograms/mg (11/2023) ; managed by nephrology  Podiatry: due  Lipids: HDL 34, non-,  (02/2024) on rosuvastatin 20 mg QDAY, fenofibrate 145 mg QDAY, Vascepa 2 g BID  *Avoid GLP-1 RA due to hx of recurrent pancreatitis*    A1c 6.6% today but this inaccurate due to anemia. CGM readings suggest an A1c closer to 8%.  Mandie blue reviewed. Last reading from March 3.  She was not signed into her trina. Had her reset her password and sign in. Her trina is now reconnected to our clinic, but I am not able to see recent readings. May have to wait until she changes her sensor for new readings to upload.     Based on her readings from March, she still has significant hyperglycemia.   BG is stable and often within target range during the day, but after dinner, BG spikes to >400 and often remains  elevated overnight. Reports taking Humalog before meals consistently.  On her trina, recent BG data shows hyperglycemia after breakfast and lunch as well.  Will increase Humalog doses for each meal.  She does take extra corrections with Humalog when hyperglycemic. This causes a sharp drop in blood sugars which sometimes leads to hypoglycemia. Discussed that the goal is to adjust her premeal Humalog so that she does not have significant post prandial hyperglycemia requiring extra insulin after meals.     She is requiring very high doses of prandial insulin. However, her BG drops quickly to correctional insulin, so she does respond to insulin.      PLAN:  -continue Basaglar 35 units BID  -increase Humalog to 15-40-55 units TIDAC+SS#2  -check blood sugars before every meal and bedtime (uses Mandie 3 with reader)  -due for eye exam and podiatry (reminded patient to schedule)  -continue Vascepa 2g BID  -follow-up with Se Lei PharmD in 1 month (reschedule)  -check fructosamine, c-peptide, lipids, CMP    2. Hypothyroidism  3. Thyroid nodules  Current regimen: levothyroxine 50 mcg/day     Thyroid US (06/2024)  Right  -lower pole; 0.7 x 0.5 x 0.6 cm, spongiform (0.6 x 0.5 x 0.6 cm in 08/2023)  Left:  -upper pole; 0.7  x 0.5 x 0.7 cm, hypoechoic, solid (0.7 x 0.5 x 0.7 cm in 08/2023)    Labs from 02/2024  TSH 4.77    Labs from 09/2024  TSH 2.07    PLAN:  -continue levothyroxine 50 mcg/day    3. Secondary adrenal insufficiency  4. Hx of chronic steroid use for Crohn's disease  Reported falls and a syncopal episode in July 2023.  She also reported lower baseline BP, lightheadedness, and dizziness.  Advised to have labs done to assess for AI but she never got this done.  Cortisol was checked by Dr. Hussein () on 12/7/2023.  Random cortisol (4 pm): 8.7     She has a hx of long-term steroid use for Crohn's disease (prednisone 5 mg QDAY x > 15 years) and apparently had stopped taking prednisone in July 2023.  When Dr. Hussein reached  out to me in October 2023, I recommended resuming prednisone 5 mg QDAY. But she was resumed on prednisone 10 mg QDAY.   Reduced prednisone to 5 mg QDAY in 01/2024 then to 2.5 mg QDAY in 02/2024.    Remains on prednisone 2.5 mg QDAY.    PLAN:  -check AM cortisol (hold prednisone the day prior)  -continue prednisone 2.5 mg QDAY for now     Follow-up in 2-3 months with Dr. Salmon ([facility])  Uses ubigrate.

## 2025-04-24 ENCOUNTER — APPOINTMENT (OUTPATIENT)
Dept: NEPHROLOGY | Facility: CLINIC | Age: 65
End: 2025-04-24
Payer: COMMERCIAL

## 2025-04-28 ENCOUNTER — APPOINTMENT (OUTPATIENT)
Dept: ENDOCRINOLOGY | Facility: CLINIC | Age: 65
End: 2025-04-28
Payer: COMMERCIAL

## 2025-04-28 VITALS
HEART RATE: 86 BPM | BODY MASS INDEX: 22.82 KG/M2 | WEIGHT: 142 LBS | SYSTOLIC BLOOD PRESSURE: 115 MMHG | HEIGHT: 66 IN | DIASTOLIC BLOOD PRESSURE: 76 MMHG

## 2025-04-28 DIAGNOSIS — Z79.4 TYPE 2 DIABETES MELLITUS WITHOUT COMPLICATION, WITH LONG-TERM CURRENT USE OF INSULIN: ICD-10-CM

## 2025-04-28 DIAGNOSIS — E11.65 TYPE 2 DIABETES MELLITUS WITH HYPERGLYCEMIA, WITH LONG-TERM CURRENT USE OF INSULIN: ICD-10-CM

## 2025-04-28 DIAGNOSIS — E27.49 SECONDARY ADRENAL INSUFFICIENCY (MULTI): Primary | ICD-10-CM

## 2025-04-28 DIAGNOSIS — E11.9 TYPE 2 DIABETES MELLITUS WITHOUT COMPLICATION, WITH LONG-TERM CURRENT USE OF INSULIN: ICD-10-CM

## 2025-04-28 DIAGNOSIS — Z79.4 TYPE 2 DIABETES MELLITUS WITH HYPERGLYCEMIA, WITH LONG-TERM CURRENT USE OF INSULIN: ICD-10-CM

## 2025-04-28 LAB — POC HEMOGLOBIN A1C: 6.6 % (ref 4.2–6.5)

## 2025-04-28 PROCEDURE — 3044F HG A1C LEVEL LT 7.0%: CPT | Performed by: STUDENT IN AN ORGANIZED HEALTH CARE EDUCATION/TRAINING PROGRAM

## 2025-04-28 PROCEDURE — 95251 CONT GLUC MNTR ANALYSIS I&R: CPT | Performed by: STUDENT IN AN ORGANIZED HEALTH CARE EDUCATION/TRAINING PROGRAM

## 2025-04-28 PROCEDURE — 83036 HEMOGLOBIN GLYCOSYLATED A1C: CPT | Performed by: STUDENT IN AN ORGANIZED HEALTH CARE EDUCATION/TRAINING PROGRAM

## 2025-04-28 PROCEDURE — 3074F SYST BP LT 130 MM HG: CPT | Performed by: STUDENT IN AN ORGANIZED HEALTH CARE EDUCATION/TRAINING PROGRAM

## 2025-04-28 PROCEDURE — 3078F DIAST BP <80 MM HG: CPT | Performed by: STUDENT IN AN ORGANIZED HEALTH CARE EDUCATION/TRAINING PROGRAM

## 2025-04-28 PROCEDURE — 1159F MED LIST DOCD IN RCRD: CPT | Performed by: STUDENT IN AN ORGANIZED HEALTH CARE EDUCATION/TRAINING PROGRAM

## 2025-04-28 PROCEDURE — 99215 OFFICE O/P EST HI 40 MIN: CPT | Performed by: STUDENT IN AN ORGANIZED HEALTH CARE EDUCATION/TRAINING PROGRAM

## 2025-04-28 PROCEDURE — 3008F BODY MASS INDEX DOCD: CPT | Performed by: STUDENT IN AN ORGANIZED HEALTH CARE EDUCATION/TRAINING PROGRAM

## 2025-04-28 RX ORDER — BLOOD-GLUCOSE SENSOR
EACH MISCELLANEOUS
Qty: 6 EACH | Refills: 3 | Status: SHIPPED | OUTPATIENT
Start: 2025-04-28 | End: 2025-04-29 | Stop reason: WASHOUT

## 2025-04-28 RX ORDER — INSULIN LISPRO 100 [IU]/ML
INJECTION, SOLUTION INTRAVENOUS; SUBCUTANEOUS
Qty: 120 ML | Refills: 1 | Status: SHIPPED | OUTPATIENT
Start: 2025-04-28 | End: 2025-04-29 | Stop reason: SDUPTHER

## 2025-04-28 NOTE — PATIENT INSTRUCTIONS
Continue Basaglar 35 units twice a day  Change Humalog to 15-40-55 units before each meal + sliding scale  Please schedule diabetes eye exam and podiatry visit    Scheduling for podiatry: #[phone]    4. HOLD prednisone the day before blood tests.  Go in for the blood test by 9:30 AM.  Resume prednisone AFTER you have the blood drawn.

## 2025-04-29 DIAGNOSIS — E11.9 TYPE 2 DIABETES MELLITUS WITHOUT COMPLICATION, WITH LONG-TERM CURRENT USE OF INSULIN: ICD-10-CM

## 2025-04-29 DIAGNOSIS — E11.65 TYPE 2 DIABETES MELLITUS WITH HYPERGLYCEMIA, WITH LONG-TERM CURRENT USE OF INSULIN: ICD-10-CM

## 2025-04-29 DIAGNOSIS — Z79.4 TYPE 2 DIABETES MELLITUS WITH HYPERGLYCEMIA, WITH LONG-TERM CURRENT USE OF INSULIN: ICD-10-CM

## 2025-04-29 DIAGNOSIS — Z79.4 TYPE 2 DIABETES MELLITUS WITHOUT COMPLICATION, WITH LONG-TERM CURRENT USE OF INSULIN: ICD-10-CM

## 2025-04-29 RX ORDER — BLOOD-GLUCOSE SENSOR
EACH MISCELLANEOUS
Qty: 6 EACH | Refills: 3 | Status: SHIPPED | OUTPATIENT
Start: 2025-04-29

## 2025-04-29 RX ORDER — INSULIN LISPRO 100 [IU]/ML
INJECTION, SOLUTION INTRAVENOUS; SUBCUTANEOUS
Qty: 120 ML | Refills: 1 | Status: SHIPPED | OUTPATIENT
Start: 2025-04-29

## 2025-05-01 ENCOUNTER — OFFICE VISIT (OUTPATIENT)
Dept: NEPHROLOGY | Facility: CLINIC | Age: 65
End: 2025-05-01
Payer: COMMERCIAL

## 2025-05-01 VITALS — SYSTOLIC BLOOD PRESSURE: 134 MMHG | HEART RATE: 84 BPM | DIASTOLIC BLOOD PRESSURE: 83 MMHG

## 2025-05-01 DIAGNOSIS — E21.3 HYPERPARATHYROIDISM (MULTI): ICD-10-CM

## 2025-05-01 DIAGNOSIS — N18.4 CHRONIC KIDNEY DISEASE, STAGE 4 (SEVERE) (MULTI): Primary | ICD-10-CM

## 2025-05-01 DIAGNOSIS — D50.8 OTHER IRON DEFICIENCY ANEMIA: ICD-10-CM

## 2025-05-01 PROCEDURE — 3044F HG A1C LEVEL LT 7.0%: CPT | Performed by: INTERNAL MEDICINE

## 2025-05-01 PROCEDURE — 3079F DIAST BP 80-89 MM HG: CPT | Performed by: INTERNAL MEDICINE

## 2025-05-01 PROCEDURE — 1159F MED LIST DOCD IN RCRD: CPT | Performed by: INTERNAL MEDICINE

## 2025-05-01 PROCEDURE — 3075F SYST BP GE 130 - 139MM HG: CPT | Performed by: INTERNAL MEDICINE

## 2025-05-01 PROCEDURE — 99214 OFFICE O/P EST MOD 30 MIN: CPT | Performed by: INTERNAL MEDICINE

## 2025-05-01 NOTE — PATIENT INSTRUCTIONS
No changes in medication  Labs tomorrow, including urine  Continue visits to renal treatment clinic monthly  See you back in~ 4 months.  Happy Mother's Day?)!

## 2025-05-01 NOTE — PROGRESS NOTES
Agnes M Reyes is a 65 y.o. female here in follow up for CKD    Subjective   Here after a long absence. Attends the renal treatment clinic regularly. Feeling very weak and fatigued, ends up sleeping a lot. Bowels are problematic, so seeing Dr. Hussein soon. No dysuria, hematuria. Fevers, chest pain, dyspnea or leg swelling. Meds are as listed. Discussed to take an extra bicarb tab days when diarrhea severe.        ROS  All the rest reviewed and negative    Objective     Vital signs  /83 (Patient Position: Sitting, BP Cuff Size: Adult)   Pulse 84         Physical Exam  Constitutional:   NAD  Psychiatric:  appropriate mood and behavior    HEENT: NC/AT, clear sclerae, moist mucous membranes  Cardiovascular: normal S1, S2, RRR, no M,R.G  Pulmonary:  Normal breath sounds  Extremities: no edema  Skin:  warm and dry    Meds  Current Medications[1]     Allergies  RX Allergies[2]     Results  Recent Results (from the past 4 weeks)   PTH, Intact    Collection Time: 04/07/25  2:57 PM   Result Value Ref Range    Parathyroid Hormone, Intact 14.7 (L) 18.5 - 88.0 pg/mL   CBC    Collection Time: 04/07/25  2:57 PM   Result Value Ref Range    WBC 8.2 4.4 - 11.3 x10*3/uL    nRBC 0.0 0.0 - 0.0 /100 WBCs    RBC 3.46 (L) 4.00 - 5.20 x10*6/uL    Hemoglobin 10.6 (L) 12.0 - 16.0 g/dL    Hematocrit 33.9 (L) 36.0 - 46.0 %    MCV 98 80 - 100 fL    MCH 30.6 26.0 - 34.0 pg    MCHC 31.3 (L) 32.0 - 36.0 g/dL    RDW 13.3 11.5 - 14.5 %    Platelets 175 150 - 450 x10*3/uL   Renal Function Panel    Collection Time: 04/07/25  2:57 PM   Result Value Ref Range    Glucose 157 (H) 74 - 99 mg/dL    Sodium 137 136 - 145 mmol/L    Potassium 5.7 (H) 3.5 - 5.3 mmol/L    Chloride 107 98 - 107 mmol/L    Bicarbonate 20 (L) 21 - 32 mmol/L    Anion Gap 16 10 - 20 mmol/L    Urea Nitrogen 45 (H) 6 - 23 mg/dL    Creatinine 2.23 (H) 0.50 - 1.05 mg/dL    eGFR 24 (L) >60 mL/min/1.73m*2    Calcium 9.0 8.6 - 10.6 mg/dL    Phosphorus 4.5 2.5 - 4.9 mg/dL    Albumin 3.6 3.4  - 5.0 g/dL   Urinalysis with Reflex Culture and Microscopic    Collection Time: 04/18/25 12:45 PM   Result Value Ref Range    COLOR YELLOW YELLOW    APPEARANCE CLEAR CLEAR    SPECIFIC GRAVITY 1.025 1.001 - 1.035    PH 6.0 5.0 - 8.0    GLUCOSE NEGATIVE NEGATIVE    BILIRUBIN NEGATIVE NEGATIVE    KETONES NEGATIVE NEGATIVE    OCCULT BLOOD 3+ (A) NEGATIVE    PROTEIN 2+ (A) NEGATIVE    NITRITE POSITIVE (A) NEGATIVE    LEUKOCYTE ESTERASE 2+ (A) NEGATIVE    WBC > OR = 60 (A) < OR = 5 /HPF    RBC 40-60 (A) < OR = 2 /HPF    SQUAMOUS EPITHELIAL CELLS 6-10 (A) < OR = 5 /HPF    TRANSITIONAL EPITHELIAL CELLS 0-5 < OR = 5 /HPF    BACTERIA MODERATE (A) NONE SEEN /HPF    HYALINE CAST NONE SEEN NONE SEEN /LPF    NOTE      REFLEXIVE URINE CULTURE      CULTURE, URINE, ROUTINE SEE NOTE (A)    POCT glycosylated hemoglobin (Hb A1C) manually resulted    Collection Time: 04/28/25  2:35 PM   Result Value Ref Range    POC HEMOGLOBIN A1c 6.6 (A) 4.2 - 6.5 %         Imaging results  CT CHEST ABDOMEN PELVIS WO CONTRAST;  7/15/2023   ADRENAL GLANDS:   Bilateral adrenal glands appear normal.      KIDNEYS AND URETERS:   The kidneys have normal size and appear unremarkable. A 3 mm   nonobstructing stone is noted lower pole. No hydroureteronephrosis is present.      BLADDER:   The urinary bladder appears within normal limits. There is gas in the anti dependent portion of the bladder.   Finn Julien MD 7/15/2023 09:26          Assessment and Plan  CKD stage G3-4/A3 likely ischemic and diabetic nephropathy, low suspicion for biologics -induced renal disease; secondary amyloidosis possible. Serum creatinine this month 2.23 mg/dl,  rendering an eGFR of ~24 ml/min/1.73. Urine studies with overt proteinuria however prior hyperkalemia prohibits use of ACEi or ARB for its treatment, unless administered with Lokelma.  Cannot initiate Farxiga with recurrent UTI.      HTN. BP here on target. Continue current meds.     3.   Hypoparathyroidism,  acquired- noted.      4.  Hypomagnesemia, corrected with ongoing Mg  supplements. Recheck Mg with next lab draw.     Anemia. H/H stable. FAZAL in renal treatment clinic per submitted orders.     6.  H/o low vitamin D level -  repeat ordered.     RTC in follow up in 4 months.       Nani Burch MD         [1]   Current Outpatient Medications:     acetaminophen (Tylenol) 325 mg tablet, Take 2 tablets (650 mg) by mouth every 4 hours if needed., Disp: , Rfl:     cholecalciferol (Vitamin D-3) 50 mcg (2,000 unit) capsule, Take 1 capsule (2,000 Units) by mouth once daily., Disp: , Rfl:     fenofibrate (Tricor) 145 mg tablet, Take 1 tablet (145 mg) by mouth once daily., Disp: , Rfl:     fluconazole (Diflucan) 200 mg tablet, Take 1 tablet (200 mg) by mouth once daily., Disp: 90 tablet, Rfl: 3    FreeStyle Mandie 3 Plus Sensor device, Change sensor every 15 days, Disp: 6 each, Rfl: 3    gabapentin (Neurontin) 300 mg capsule, TAKE 1 CAPSULE(300 MG) BY MOUTH DAILY AT BEDTIME (Patient taking differently: Take 1 capsule (300 mg) by mouth once daily at bedtime.), Disp: 90 capsule, Rfl: 1    icosapent ethyL (Vascepa) 1 gram capsule, Take 2 capsules (2 g) by mouth 2 times a day., Disp: 360 capsule, Rfl: 3    insulin glargine (Basaglar KwikPen U-100 Insulin) 100 unit/mL (3 mL) pen, Inject 35 units twice a day (Patient taking differently: 35 Units 2 times a day. Inject 35 units twice a day), Disp: 75 mL, Rfl: 1    insulin lispro (HumaLOG KwikPen Insulin) 100 unit/mL pen, Inject 15 units before breakfast, 40 units before lunch, 55 units before dinner.  units. (Patient taking differently: Inject under the skin 3 times daily (morning, midday, late afternoon). Inject 15 units before breakfast, 40 units before lunch, 55 units before dinner.  units.), Disp: 120 mL, Rfl: 1    levothyroxine (Synthroid, Levoxyl) 50 mcg tablet, Take 1 tablet daily (first thing in the morning on an empty stomach with water. Wait 30 mins before  eating/drinking/taking other meds. Wait 4 hours before taking calcium/iron/multivitamins), Disp: 90 tablet, Rfl: 3    magnesium chloride (Slow-Mag) 71.5 mg tablet,delayed release (DR/EC), Take 1 tablet (71.5 mg) by mouth 2 times a day., Disp: , Rfl:     multivitamin with minerals iron-free (Centrum Silver), Take 1 tablet by mouth once daily., Disp: , Rfl:     omeprazole (PriLOSEC) 40 mg DR capsule, , Disp: , Rfl:     predniSONE (Deltasone) 5 mg tablet, Take 0.5 tablets (2.5 mg) by mouth once daily., Disp: 45 tablet, Rfl: 3    rosuvastatin (Crestor) 20 mg tablet, Take 1 tablet (20 mg) by mouth once daily., Disp: , Rfl:     sertraline (Zoloft) 100 mg tablet, Take by mouth once daily., Disp: , Rfl:     traZODone (Desyrel) 50 mg tablet, Take 1 tablet (50 mg) by mouth once daily at bedtime., Disp: , Rfl:     multivitamin with minerals (multivit-min-iron fum-folic ac) tablet, Take 1 tablet by mouth once daily. (Patient not taking: Reported on 5/1/2025), Disp: , Rfl:     sodium bicarbonate 650 mg tablet, Take 2 tablets (1,300 mg) by mouth 2 times a day., Disp: 120 tablet, Rfl: 11  [2]   Allergies  Allergen Reactions    Dye Anaphylaxis     IV Contrast Dye    Iodides Anaphylaxis    Iodinated Contrast Media Anaphylaxis     STOPPED HEART    Toradol [Ketorolac] Shortness of breath and Swelling    Adhesive Tape-Silicones Other     Blisters

## 2025-05-02 ENCOUNTER — APPOINTMENT (OUTPATIENT)
Dept: DIALYSIS | Facility: HOSPITAL | Age: 65
End: 2025-05-02
Payer: COMMERCIAL

## 2025-05-05 ENCOUNTER — APPOINTMENT (OUTPATIENT)
Dept: DIALYSIS | Facility: HOSPITAL | Age: 65
End: 2025-05-05
Payer: COMMERCIAL

## 2025-05-06 ENCOUNTER — HOSPITAL ENCOUNTER (OUTPATIENT)
Dept: DIALYSIS | Facility: HOSPITAL | Age: 65
Setting detail: DIALYSIS SERIES
Discharge: HOME | End: 2025-05-06
Payer: COMMERCIAL

## 2025-05-06 VITALS
SYSTOLIC BLOOD PRESSURE: 111 MMHG | BODY MASS INDEX: 23.34 KG/M2 | HEART RATE: 95 BPM | DIASTOLIC BLOOD PRESSURE: 74 MMHG | WEIGHT: 142.42 LBS

## 2025-05-06 DIAGNOSIS — E83.42 HYPOMAGNESEMIA: ICD-10-CM

## 2025-05-06 DIAGNOSIS — N18.9 HISTORY OF ANEMIA DUE TO CHRONIC KIDNEY DISEASE: ICD-10-CM

## 2025-05-06 DIAGNOSIS — Z86.2 HISTORY OF ANEMIA DUE TO CHRONIC KIDNEY DISEASE: ICD-10-CM

## 2025-05-06 DIAGNOSIS — E20.9 HYPOPARATHYROIDISM, UNSPECIFIED HYPOPARATHYROIDISM TYPE (MULTI): Primary | ICD-10-CM

## 2025-05-06 DIAGNOSIS — N18.4 CHRONIC KIDNEY DISEASE, STAGE 4 (SEVERE) (MULTI): ICD-10-CM

## 2025-05-06 LAB
ALBUMIN SERPL BCP-MCNC: 3.8 G/DL (ref 3.4–5)
ANION GAP SERPL CALC-SCNC: 19 MMOL/L (ref 10–20)
BUN SERPL-MCNC: 32 MG/DL (ref 6–23)
CALCIUM SERPL-MCNC: 9.3 MG/DL (ref 8.6–10.6)
CHLORIDE SERPL-SCNC: 102 MMOL/L (ref 98–107)
CO2 SERPL-SCNC: 20 MMOL/L (ref 21–32)
CREAT SERPL-MCNC: 2.37 MG/DL (ref 0.5–1.05)
EGFRCR SERPLBLD CKD-EPI 2021: 22 ML/MIN/1.73M*2
ERYTHROCYTE [DISTWIDTH] IN BLOOD BY AUTOMATED COUNT: 12.8 % (ref 11.5–14.5)
GLUCOSE SERPL-MCNC: 280 MG/DL (ref 74–99)
HCT VFR BLD AUTO: 35 % (ref 36–46)
HGB BLD-MCNC: 11.3 G/DL (ref 12–16)
MCH RBC QN AUTO: 30.9 PG (ref 26–34)
MCHC RBC AUTO-ENTMCNC: 32.3 G/DL (ref 32–36)
MCV RBC AUTO: 96 FL (ref 80–100)
NRBC BLD-RTO: 0 /100 WBCS (ref 0–0)
PHOSPHATE SERPL-MCNC: 3.9 MG/DL (ref 2.5–4.9)
PLATELET # BLD AUTO: 271 X10*3/UL (ref 150–450)
POTASSIUM SERPL-SCNC: 4.8 MMOL/L (ref 3.5–5.3)
RBC # BLD AUTO: 3.66 X10*6/UL (ref 4–5.2)
SODIUM SERPL-SCNC: 136 MMOL/L (ref 136–145)
WBC # BLD AUTO: 7 X10*3/UL (ref 4.4–11.3)

## 2025-05-06 PROCEDURE — 83735 ASSAY OF MAGNESIUM: CPT | Performed by: INTERNAL MEDICINE

## 2025-05-06 PROCEDURE — 6350000001 HC RX 635 EPOETIN >10,000 UNITS: Mod: JZ,TB

## 2025-05-06 PROCEDURE — 96372 THER/PROPH/DIAG INJ SC/IM: CPT

## 2025-05-06 PROCEDURE — 36415 COLL VENOUS BLD VENIPUNCTURE: CPT | Performed by: INTERNAL MEDICINE

## 2025-05-06 PROCEDURE — 36415 COLL VENOUS BLD VENIPUNCTURE: CPT

## 2025-05-06 PROCEDURE — 84100 ASSAY OF PHOSPHORUS: CPT | Performed by: INTERNAL MEDICINE

## 2025-05-06 PROCEDURE — 85027 COMPLETE CBC AUTOMATED: CPT | Performed by: INTERNAL MEDICINE

## 2025-05-06 RX ORDER — DIPHENHYDRAMINE HYDROCHLORIDE 50 MG/ML
50 INJECTION, SOLUTION INTRAMUSCULAR; INTRAVENOUS AS NEEDED
OUTPATIENT
Start: 2025-06-02

## 2025-05-06 RX ADMIN — EPOETIN ALFA-EPBX 40000 UNITS: 40000 INJECTION, SOLUTION INTRAVENOUS; SUBCUTANEOUS at 14:47

## 2025-05-06 ASSESSMENT — PAIN SCALES - GENERAL: PAINLEVEL_OUTOF10: 0-NO PAIN

## 2025-05-06 NOTE — PROGRESS NOTES
Name: Agnes M Reyes  MRN: [id number]  : 1960      Reason for Visit: Injections (Epogen 40,000 mcg and labs.)      Vitals:    25 1429   BP: 111/74   BP Location: Left arm   Patient Position: Sitting   BP Cuff Size: Adult   Pulse: 95   Weight: 64.6 kg (142 lb 6.7 oz)        How is your energy? Bad  How is your appetite? No appetite  Any nausea and vomiting after eating? Nausea and vomiting after meal yesterday.  Any diarrhea or constipation? Diarrhea with every BM since yesterday . History of Chrohn's disease.  Any SOB? No  Any Edema in lower extremities? No  Have you had any recent infections? UTI about 3-4 weeks ago  Have you been on antibiotics recently? Finished ATB for UTI about 2 weeks ago.  Any questions regarding your Kidney disease? No  DO you have a Dialysis accesses? No    Hemoglobin   Date/Time Value Ref Range Status   2025 02:57 PM 10.6 (L) 12.0 - 16.0 g/dL Final   2025 03:50 PM 11.0 (L) 12.0 - 16.0 g/dL Final     Ferritin   Date/Time Value Ref Range Status   2025 03:50  (H) 8 - 150 ng/mL Final     % Saturation   Date/Time Value Ref Range Status   2025 03:50 PM 22 (L) 25 - 45 % Final        Medications Given in Clinic: Epogen 40,000 mcg      Nursing notes:  Patient assessed, and vital signs obtained. CBC and RFP drawn as ordered. Epogen given. Patient tolerated well.       RN: PATRICK OBANDO RN     25  2:32 PM

## 2025-05-07 LAB — MAGNESIUM SERPL-MCNC: 1.86 MG/DL (ref 1.6–2.4)

## 2025-05-14 ENCOUNTER — TELEPHONE (OUTPATIENT)
Age: 65
End: 2025-05-14

## 2025-05-14 NOTE — TELEPHONE ENCOUNTER
Patient requested refills to be refilled today as she is going out of town, but Patient has not been since 10/20/23. Informed Patient that Dr. Watson will not send in refills until she is seen in office. Patient became upset stating that Dr. Sotelo doesn't care if she took medication or not. Reiterated to patient that we are not able to refill prescriptions if they have not been seen since 10/2023 and offered to schedule first available appointment. Patient requested to speak to the doctor directly, informed she is seeing patients and could not come to the phone. Patient became upset and hung up phone. Patient requesting call back at [phone].     -FYI

## 2025-05-15 ENCOUNTER — OFFICE VISIT (OUTPATIENT)
Age: 65
End: 2025-05-15

## 2025-05-15 VITALS
BODY MASS INDEX: 23.78 KG/M2 | TEMPERATURE: 98.2 F | SYSTOLIC BLOOD PRESSURE: 90 MMHG | HEIGHT: 66 IN | HEART RATE: 94 BPM | WEIGHT: 148 LBS | OXYGEN SATURATION: 96 % | DIASTOLIC BLOOD PRESSURE: 60 MMHG

## 2025-05-15 DIAGNOSIS — K21.9 GASTROESOPHAGEAL REFLUX DISEASE WITHOUT ESOPHAGITIS: ICD-10-CM

## 2025-05-15 DIAGNOSIS — Z91.81 AT HIGH RISK FOR FALLS: ICD-10-CM

## 2025-05-15 DIAGNOSIS — F51.01 PRIMARY INSOMNIA: ICD-10-CM

## 2025-05-15 DIAGNOSIS — K50.919 CROHN'S DISEASE WITH COMPLICATION, UNSPECIFIED GASTROINTESTINAL TRACT LOCATION (HCC): ICD-10-CM

## 2025-05-15 DIAGNOSIS — Z79.4 TYPE 2 DIABETES MELLITUS WITH STAGE 4 CHRONIC KIDNEY DISEASE, WITH LONG-TERM CURRENT USE OF INSULIN (HCC): ICD-10-CM

## 2025-05-15 DIAGNOSIS — E78.2 MIXED HYPERLIPIDEMIA: Primary | ICD-10-CM

## 2025-05-15 DIAGNOSIS — Z00.00 ENCOUNTER FOR WELL ADULT EXAM WITHOUT ABNORMAL FINDINGS: ICD-10-CM

## 2025-05-15 DIAGNOSIS — K86.1 CHRONIC PANCREATITIS, UNSPECIFIED PANCREATITIS TYPE (HCC): ICD-10-CM

## 2025-05-15 DIAGNOSIS — Z12.31 ENCOUNTER FOR SCREENING MAMMOGRAM FOR MALIGNANT NEOPLASM OF BREAST: ICD-10-CM

## 2025-05-15 DIAGNOSIS — N18.4 TYPE 2 DIABETES MELLITUS WITH STAGE 4 CHRONIC KIDNEY DISEASE, WITH LONG-TERM CURRENT USE OF INSULIN (HCC): ICD-10-CM

## 2025-05-15 DIAGNOSIS — E78.2 MIXED HYPERLIPIDEMIA: ICD-10-CM

## 2025-05-15 DIAGNOSIS — F17.200 TOBACCO DEPENDENCE: ICD-10-CM

## 2025-05-15 DIAGNOSIS — F32.89 OTHER DEPRESSION: ICD-10-CM

## 2025-05-15 DIAGNOSIS — E03.9 HYPOTHYROIDISM, UNSPECIFIED TYPE: ICD-10-CM

## 2025-05-15 DIAGNOSIS — E11.22 TYPE 2 DIABETES MELLITUS WITH STAGE 4 CHRONIC KIDNEY DISEASE, WITH LONG-TERM CURRENT USE OF INSULIN (HCC): ICD-10-CM

## 2025-05-15 DIAGNOSIS — Z87.891 PERSONAL HISTORY OF TOBACCO USE: ICD-10-CM

## 2025-05-15 LAB
ALBUMIN SERPL-MCNC: 3.2 G/DL (ref 3.5–4.6)
ALP SERPL-CCNC: 63 U/L (ref 40–130)
ALT SERPL-CCNC: 8 U/L (ref 0–33)
AMYLASE SERPL-CCNC: 44 U/L (ref 22–93)
ANION GAP SERPL CALCULATED.3IONS-SCNC: 15 MEQ/L (ref 9–15)
ANISOCYTOSIS BLD QL SMEAR: ABNORMAL
AST SERPL-CCNC: 23 U/L (ref 0–35)
BASOPHILS # BLD: 0 K/UL (ref 0–0.2)
BASOPHILS NFR BLD: 0.3 %
BILIRUB SERPL-MCNC: 0.4 MG/DL (ref 0.2–0.7)
BUN SERPL-MCNC: 21 MG/DL (ref 8–23)
CALCIUM SERPL-MCNC: 8.5 MG/DL (ref 8.5–9.9)
CHLORIDE SERPL-SCNC: 103 MEQ/L (ref 95–107)
CHOLEST SERPL-MCNC: 85 MG/DL (ref 0–199)
CO2 SERPL-SCNC: 23 MEQ/L (ref 20–31)
CREAT SERPL-MCNC: 2.22 MG/DL (ref 0.5–0.9)
DACRYOCYTES BLD QL SMEAR: ABNORMAL
EOSINOPHIL # BLD: 0.1 K/UL (ref 0–0.7)
EOSINOPHIL NFR BLD: 1.4 %
ERYTHROCYTE [DISTWIDTH] IN BLOOD BY AUTOMATED COUNT: 13.9 % (ref 11.5–14.5)
GLOBULIN SER CALC-MCNC: 3.3 G/DL (ref 2.3–3.5)
GLUCOSE SERPL-MCNC: 148 MG/DL (ref 70–99)
HCT VFR BLD AUTO: 34.2 % (ref 37–47)
HDLC SERPL-MCNC: 15 MG/DL (ref 40–59)
HGB BLD-MCNC: 10.1 G/DL (ref 12–16)
HYPOCHROMIA BLD QL SMEAR: ABNORMAL
LDLC SERPL CALC-MCNC: 8 MG/DL (ref 0–129)
LIPASE SERPL-CCNC: 16 U/L (ref 12–95)
LYMPHOCYTES # BLD: 1.3 K/UL (ref 1–4.8)
LYMPHOCYTES NFR BLD: 19.3 %
MACROCYTES BLD QL SMEAR: ABNORMAL
MCH RBC QN AUTO: 30.7 PG (ref 27–31.3)
MCHC RBC AUTO-ENTMCNC: 29.5 % (ref 33–37)
MCV RBC AUTO: 104 FL (ref 79.4–94.8)
MONOCYTES # BLD: 0.6 K/UL (ref 0.2–0.8)
MONOCYTES NFR BLD: 9.9 %
NEUTROPHILS # BLD: 4.4 K/UL (ref 1.4–6.5)
NEUTS SEG NFR BLD: 68.5 %
OVALOCYTES BLD QL SMEAR: ABNORMAL
PLATELET # BLD AUTO: 272 K/UL (ref 130–400)
PLATELET BLD QL SMEAR: ADEQUATE
POIKILOCYTOSIS BLD QL SMEAR: ABNORMAL
POTASSIUM SERPL-SCNC: 4.1 MEQ/L (ref 3.4–4.9)
PROT SERPL-MCNC: 6.5 G/DL (ref 6.3–8)
RBC # BLD AUTO: 3.29 M/UL (ref 4.2–5.4)
SLIDE REVIEW: ABNORMAL
SODIUM SERPL-SCNC: 141 MEQ/L (ref 135–144)
TRIGL SERPL-MCNC: 310 MG/DL (ref 0–150)
TSH REFLEX: 3.11 UIU/ML (ref 0.44–3.86)
WBC # BLD AUTO: 6.5 K/UL (ref 4.8–10.8)

## 2025-05-15 RX ORDER — OMEPRAZOLE 40 MG/1
CAPSULE, DELAYED RELEASE ORAL
Qty: 90 CAPSULE | Refills: 1 | Status: SHIPPED | OUTPATIENT
Start: 2025-05-15

## 2025-05-15 RX ORDER — TRAZODONE HYDROCHLORIDE 100 MG/1
100 TABLET ORAL NIGHTLY
Qty: 90 TABLET | Refills: 1 | Status: SHIPPED | OUTPATIENT
Start: 2025-05-15

## 2025-05-15 RX ORDER — FENOFIBRATE 145 MG/1
145 TABLET, FILM COATED ORAL DAILY
Qty: 90 TABLET | Refills: 1 | Status: SHIPPED | OUTPATIENT
Start: 2025-05-15

## 2025-05-15 RX ORDER — LEVOTHYROXINE SODIUM 50 UG/1
50 TABLET ORAL EVERY MORNING
Qty: 90 TABLET | Refills: 1 | Status: CANCELLED | OUTPATIENT
Start: 2025-05-15

## 2025-05-15 RX ORDER — ROSUVASTATIN CALCIUM 20 MG/1
20 TABLET, COATED ORAL EVERY MORNING
Qty: 90 TABLET | Refills: 1 | Status: SHIPPED | OUTPATIENT
Start: 2025-05-15

## 2025-05-15 RX ORDER — SERTRALINE HYDROCHLORIDE 100 MG/1
100 TABLET, FILM COATED ORAL DAILY
Qty: 90 TABLET | Refills: 1 | Status: SHIPPED | OUTPATIENT
Start: 2025-05-15

## 2025-05-15 SDOH — ECONOMIC STABILITY: FOOD INSECURITY: WITHIN THE PAST 12 MONTHS, THE FOOD YOU BOUGHT JUST DIDN'T LAST AND YOU DIDN'T HAVE MONEY TO GET MORE.: SOMETIMES TRUE

## 2025-05-15 SDOH — ECONOMIC STABILITY: FOOD INSECURITY: WITHIN THE PAST 12 MONTHS, YOU WORRIED THAT YOUR FOOD WOULD RUN OUT BEFORE YOU GOT MONEY TO BUY MORE.: SOMETIMES TRUE

## 2025-05-15 ASSESSMENT — PATIENT HEALTH QUESTIONNAIRE - PHQ9
6. FEELING BAD ABOUT YOURSELF - OR THAT YOU ARE A FAILURE OR HAVE LET YOURSELF OR YOUR FAMILY DOWN: NOT AT ALL
SUM OF ALL RESPONSES TO PHQ QUESTIONS 1-9: 8
9. THOUGHTS THAT YOU WOULD BE BETTER OFF DEAD, OR OF HURTING YOURSELF: NOT AT ALL
3. TROUBLE FALLING OR STAYING ASLEEP: NEARLY EVERY DAY
10. IF YOU CHECKED OFF ANY PROBLEMS, HOW DIFFICULT HAVE THESE PROBLEMS MADE IT FOR YOU TO DO YOUR WORK, TAKE CARE OF THINGS AT HOME, OR GET ALONG WITH OTHER PEOPLE: NOT DIFFICULT AT ALL
2. FEELING DOWN, DEPRESSED OR HOPELESS: NOT AT ALL
SUM OF ALL RESPONSES TO PHQ QUESTIONS 1-9: 8
7. TROUBLE CONCENTRATING ON THINGS, SUCH AS READING THE NEWSPAPER OR WATCHING TELEVISION: NOT AT ALL
8. MOVING OR SPEAKING SO SLOWLY THAT OTHER PEOPLE COULD HAVE NOTICED. OR THE OPPOSITE, BEING SO FIGETY OR RESTLESS THAT YOU HAVE BEEN MOVING AROUND A LOT MORE THAN USUAL: NOT AT ALL
1. LITTLE INTEREST OR PLEASURE IN DOING THINGS: NOT AT ALL
SUM OF ALL RESPONSES TO PHQ QUESTIONS 1-9: 8
SUM OF ALL RESPONSES TO PHQ QUESTIONS 1-9: 8
4. FEELING TIRED OR HAVING LITTLE ENERGY: NEARLY EVERY DAY
5. POOR APPETITE OR OVEREATING: MORE THAN HALF THE DAYS

## 2025-05-15 NOTE — PROGRESS NOTES
[facility]  [facility]  [address] [address]  Dept: [phone]  Dept Fax: [phone]  Loc: [phone]     Subjective  Agnes M Reyes, 65 y.o. female Established patient presents today with:  Chief Complaint   Patient presents with    Annual Exam     Due for routine yearly blood work    Medication Refill       History of Present Illness  The patient presents for a face-to-face visit to continue refilling all of her medications.    She is under the care of Dr. Gonzalez and has expressed a desire to decline both mammogram and CT scan of her lungs. However, she later decided to proceed with the lung CT scan due to a previously identified nodule in her lung from a scan done in 2023. She has been off amlodipine. She has not consumed any food today and is prepared to provide a urine sample. She has a history of smoking but quit 20 years ago. She is currently on prednisone.    She is seeking refills for Tricor, Prilosec, trazodone, Zoloft, Crestor, and Synthroid. She believes her thyroid function is abnormal.    She is under the care of a gastroenterologist for Crohn's disease.    She is under the care of an endocrinologist for diabetes.    She is under the care of a nephrologist for kidney issues.    She also consults with an infectious disease specialist for histoplasmosis.    She experiences dizziness and weakness, which have resulted in falls. She does not believe physical therapy is necessary at this time. She had a neurology consultation approximately one year ago.    SOCIAL HISTORY  She has not smoked in 20 years.      Past Medical History:   Diagnosis Date    Allergic rhinitis     CRF (chronic renal failure) 7/2013    Crohn's disease (HCC)     Depression     Histoplasmosis     bone marrow    Hyperlipidemia     Hypertension     Hypothyroidism 10/2020    Pancreatitis chronic     Renal failure,

## 2025-05-15 NOTE — PATIENT INSTRUCTIONS
[place] [employer] - Financial Resources*  (Call [employer]/211 if need more resources.)  UTILITY:         [employer]/211:  What they offer: Help find lower cost options for phone or internet as well as help paying utility bills.   Phone Number: 211  Website: https://www.[employer]/get-help/utilities-expenses   [employer]  What they offer:  Assistance with utilities  Phone:  [phone]    [facility]   What they Offer: Assistance with utilities through the Home Energy Assistance Program (HEAP) and the Winter Crisis Program (WCP), also sometimes called Emergency HEAP (E-HEAP).  These programs are designed to help income-eligible consumers with winter heating costs.  Also assists with the application for Percentage of Income Payment Plan (PIPP) which allows eligible residents to reduce their energy bills to a percentage of their income and avoid crisis situations.  Phone: [phone]  Website: https://www.[employer]/    [employer] [place]  What they Offer: Assistance with utilities  Phone: [phone]  Website: [email or web address].[email or web address]/    [employer]   What they Offer: Assistance with utilities and rent   Phone: [phone]   Website: www.[employer].org   Address:  [address], [place] [address]      [facility] ([facility])   What they Offer: Provides the residents of [facility], with year-round access to emergency financial assistance for rent, utilities, or other emergency basic needs--at a single site geographically located in your neighborhood of residence   Website: AN Online Application      [place] [employer]    What they offer: Utility payment assistance, utility payment plans, utility bill assistance, home energy conservation and winter/summer crisis programs.   Phone: [phone]    Fax: [phone]   Address: [facility], [address] [address]   Website:

## 2025-05-15 NOTE — TELEPHONE ENCOUNTER
Per Dr Watson patient needs an appt in order to get refill on meds.   Called and made an appt with A Ringle today  Pt states that she leaves this evening for vacation.  tamika

## 2025-05-16 LAB
ESTIMATED AVERAGE GLUCOSE: 148 MG/DL
HBA1C MFR BLD: 6.8 % (ref 4–6)

## 2025-05-20 ENCOUNTER — RESULTS FOLLOW-UP (OUTPATIENT)
Age: 65
End: 2025-05-20

## 2025-05-22 ENCOUNTER — APPOINTMENT (OUTPATIENT)
Dept: DIALYSIS | Facility: HOSPITAL | Age: 65
End: 2025-05-22
Payer: COMMERCIAL

## 2025-05-27 ENCOUNTER — APPOINTMENT (OUTPATIENT)
Dept: ENDOCRINOLOGY | Facility: CLINIC | Age: 65
End: 2025-05-27
Payer: COMMERCIAL

## 2025-05-27 NOTE — PROGRESS NOTES
Clinical Pharmacy Team met with Agnes Reyes regarding a consultation for diabetes management thanks to a refferal from Dr. Rosa Maria Amador MD. Below is a summary of our conversation and recommendations:    Recommendations:  Continue all other DM medications as prescribed  Patient should continue to use CGM to monitor glucose  ________________________________________________________________________      Allergies   Allergen Reactions    Dye Anaphylaxis     IV Contrast Dye    Iodides Anaphylaxis    Iodinated Contrast Media Anaphylaxis     STOPPED HEART    Toradol [Ketorolac] Shortness of breath and Swelling    Adhesive Tape-Silicones Other     Blisters           Objective     There were no vitals taken for this visit.    Medical History[1]    Medications Ordered Prior to Encounter[2]      Lab Review  Lab Results   Component Value Date    BILITOT 0.6 07/15/2023    CALCIUM 9.3 05/06/2025    CO2 20 (L) 05/06/2025     05/06/2025    CREATININE 2.37 (H) 05/06/2025    GLUCOSE 280 (H) 05/06/2025    ALKPHOS 72 07/15/2023    K 4.8 05/06/2025    PROT 7.1 07/15/2023     05/06/2025    AST 33 07/15/2023    ALT 21 07/15/2023    BUN 32 (H) 05/06/2025    ANIONGAP 19 05/06/2025    MG 1.86 05/06/2025    PHOS 3.9 05/06/2025    ALBUMIN 3.8 05/06/2025    LIPASE 2,186 (H) 11/20/2022    GFRF 23 (A) 09/08/2023     Lab Results   Component Value Date    TRIG 415 (H) 02/29/2024    CHOL 150 02/29/2024    LDLCALC  02/29/2024      Comment:      The calculation of LDL and VLDL are inaccurate when the Triglycerides are greater than 400 mg/dL or when the patient is non-fasting. If LDL measurement is necessary contact the testing laboratory for an alternative LDL assay.                                  Near   Borderline      AGE      Desirable  Optimal    High     High     Very High     0-19 Y     0 - 109     ---    110-129   >/= 130     ----    20-24 Y     0 - 119     ---    120-159   >/= 160     ----      >24 Y     0 -  99   100-129   130-159   160-189     >/=190      HDL 33.9 2024     Lab Results   Component Value Date    HGBA1C 6.8 (H) 05/15/2025    HGBA1C 6.6 (A) 2025    HGBA1C 6.9 (H) 2024     The 10-year ASCVD risk score (Irina ANDREWS, et al., 2019) is: 9.3%    Values used to calculate the score:      Age: 65 years      Sex: Female      Is Non- : No      Diabetic: Yes      Tobacco smoker: No      Systolic Blood Pressure: 111 mmHg      Is BP treated: No      HDL Cholesterol: 33.9 mg/dL      Total Cholesterol: 150 mg/dL        Assessment/Plan     The patient reports today for a diabetes consultation. Reviewed CGM report and discussed it with the patient. A1c is at  goal. CGM report shows occasional episodes of post prandial hyperglycemia. Reports this usually occurs for higher carb meals. Discussed lowering carb portions which patient was agreeable to. Recommend no insulin changes at this time.   Patient was agreeable to these recommendations    A minimum of 72 hours of CGM data was reviewed and used to make therapy changes.        PATIENT EDUCATION/GOALS      Goals  Fasting B - 130 mg/dL  Postprandial BG: less than 180 mg/dL  A1c: less than 7%    Provided counseling on lifestyle modifications, medications, and self-monitoring. Patient has no additional questions at this time. Please reach out with any questions. Thank you.       Se Lei, PharmD    Provider on site: Chrissie Abad CNP  Continue all meds under the continuation of care with the referring provider and clinical pharmacy team.         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    Other conditions influencing health status 2022     Uncontrolled insulin dependent diabetes mellitus    Other muscle spasm 10/14/2015    Muscle spasm    Personal history of other diseases of the digestive system 04/03/2014    History of constipation    Personal history of other diseases of the digestive system 10/20/2015    History of acute pancreatitis    Personal history of other specified conditions 04/12/2016    History of dizziness    Personal history of other specified conditions 10/20/2015    History of abnormal weight loss    Personal history of urinary (tract) infections 08/16/2019    History of urinary tract infection    Presence of intraocular lens 08/08/2019    Pseudophakia of left eye    Unspecified pre-eclampsia, unspecified trimester ([facility])     Toxemia of pregnancy   [2]   Current Outpatient Medications on File Prior to Visit   Medication Sig Dispense Refill    acetaminophen (Tylenol) 325 mg tablet Take 2 tablets (650 mg) by mouth every 4 hours if needed.      cholecalciferol (Vitamin D-3) 50 mcg (2,000 unit) capsule Take 1 capsule (2,000 Units) by mouth once daily.      fenofibrate (Tricor) 145 mg tablet Take 1 tablet (145 mg) by mouth once daily.      fluconazole (Diflucan) 200 mg tablet Take 1 tablet (200 mg) by mouth once daily. 90 tablet 3    FreeStyle Mandie 3 Plus Sensor device Change sensor every 15 days 6 each 3    gabapentin (Neurontin) 300 mg capsule TAKE 1 CAPSULE(300 MG) BY MOUTH DAILY AT BEDTIME (Patient taking differently: Take 1 capsule (300 mg) by mouth once daily at bedtime.) 90 capsule 1    icosapent ethyL (Vascepa) 1 gram capsule Take 2 capsules (2 g) by mouth 2 times a day. 360 capsule 3    insulin glargine (Basaglar KwikPen U-100 Insulin) 100 unit/mL (3 mL) pen Inject 35 units twice a day (Patient taking differently: 35 Units 2 times a day. Inject 35 units twice a day) 75 mL 1    insulin lispro (HumaLOG KwikPen Insulin) 100 unit/mL pen Inject 15 units before breakfast, 40 units before lunch, 55 units before dinner.   units. (Patient taking differently: Inject under the skin 3 times daily (morning, midday, late afternoon). Inject 15 units before breakfast, 40 units before lunch, 55 units before dinner.  units.) 120 mL 1    levothyroxine (Synthroid, Levoxyl) 50 mcg tablet Take 1 tablet daily (first thing in the morning on an empty stomach with water. Wait 30 mins before eating/drinking/taking other meds. Wait 4 hours before taking calcium/iron/multivitamins) 270 tablet 3    magnesium chloride (Slow-Mag) 71.5 mg tablet,delayed release (DR/EC) Take 1 tablet (71.5 mg) by mouth 2 times a day.      multivitamin with minerals (multivit-min-iron fum-folic ac) tablet Take 1 tablet by mouth once daily. (Patient not taking: Reported on 5/1/2025)      multivitamin with minerals iron-free (Centrum Silver) Take 1 tablet by mouth once daily.      omeprazole (PriLOSEC) 40 mg DR capsule Take 1 capsule (40 mg) by mouth once daily in the morning. Take before meals. Do not crush or chew. 30 capsule 1    predniSONE (Deltasone) 5 mg tablet Take 0.5 tablets (2.5 mg) by mouth once daily. 45 tablet 3    rosuvastatin (Crestor) 20 mg tablet Take 1 tablet (20 mg) by mouth once daily. 30 tablet 11    sertraline (Zoloft) 100 mg tablet Take 1 tablet (100 mg) by mouth once daily. 30 tablet 11    sodium bicarbonate 650 mg tablet Take 2 tablets (1,300 mg) by mouth 2 times a day. 120 tablet 11    traZODone (Desyrel) 100 mg tablet Take 1 tablet (100 mg) by mouth once daily at bedtime. 30 tablet 0     No current facility-administered medications on file prior to visit.

## 2025-05-28 ENCOUNTER — DOCUMENTATION (OUTPATIENT)
Dept: DIALYSIS | Facility: HOSPITAL | Age: 65
End: 2025-05-28
Payer: COMMERCIAL

## 2025-05-28 NOTE — PROGRESS NOTES
Message sent to Dr Burch informing her that pt most recent Hgb was 11.3 on 5/6/25 and she received Epo 40,000 in April and May.  Per Dr Burch response she would like pt to receive 20,000U  of EPO 6/2/25 visit.

## 2025-05-29 ENCOUNTER — HOSPITAL ENCOUNTER (OUTPATIENT)
Dept: DIALYSIS | Facility: HOSPITAL | Age: 65
Setting detail: DIALYSIS SERIES
Discharge: HOME | End: 2025-05-29
Payer: COMMERCIAL

## 2025-06-02 ENCOUNTER — HOSPITAL ENCOUNTER (OUTPATIENT)
Dept: DIALYSIS | Facility: HOSPITAL | Age: 65
Setting detail: DIALYSIS SERIES
End: 2025-06-02
Payer: COMMERCIAL

## 2025-06-05 ENCOUNTER — TELEPHONE (OUTPATIENT)
Dept: DIALYSIS | Facility: HOSPITAL | Age: 65
End: 2025-06-05
Payer: COMMERCIAL

## 2025-06-05 NOTE — PROGRESS NOTES
Patient called at 8:15am to cancel 1pm appointment for today. This RN offered to have appointment rescheduled, and patient stated that she would return call to reschedule. Visit cancelled.

## 2025-06-09 NOTE — PROGRESS NOTES
REASON FOR VISIT:  Crohn's disease    HPI:  Agnes M Reyes is a 65 y.o. female who presents for follow-up.  Last seen 10/2023.  Longstanding Crohn's disease, s/p subtotal colectomy with ileorectal anastomosis 2007.      2008 mild anastomostic dz, started Humira.; complete healing with normal F/S in 2012 and 2016 (sigmoid diverticulosis).     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  Path focal acute ileitis.  Stopped Humira.     11/2022 hospitalized with 1-week of n/v, generalized weakness, decreased PO intake and fevers (max 102.8) and two days of epigastric pain. CT A/P (non-contrast given severe anaphylaxis allergy to contrast) showed mild enlargement and edema of the pancreatic head and uncinated process with subtle surrounding stranding likely representing acute edematous interstitial pancreatitis.      Last seen 10/2023.  At the time had stopped prednisone and was fallign and losing weight.  Thought to perhaps be adrenally insufficient and started low dose prednisone with some improvement.    In follow-up today,       REVIEW OF SYSTEMS    Allergies   Allergen Reactions    Dye Anaphylaxis     IV Contrast Dye    Iodides Anaphylaxis    Iodinated Contrast Media Anaphylaxis     STOPPED HEART    Toradol [Ketorolac] Shortness of breath and Swelling    Adhesive Tape-Silicones Other     Blisters       Past Medical History:   Diagnosis Date    Combined forms of age-related cataract, bilateral 08/01/2019    Combined form of senile cataract of both eyes    Combined forms of age-related cataract, right eye 08/15/2019    Combined form of senile cataract of right eye    Histoplasmosis, unspecified 08/19/2022    Disseminated  histoplasmosis    Other conditions influencing health status 01/28/2022    Uncontrolled insulin dependent diabetes mellitus    Other muscle spasm 10/14/2015    Muscle spasm    Personal history of other diseases of the digestive system 04/03/2014    History of constipation    Personal history of other diseases of the digestive system 10/20/2015    History of acute pancreatitis    Personal history of other specified conditions 04/12/2016    History of dizziness    Personal history of other specified conditions 10/20/2015    History of abnormal weight loss    Personal history of urinary (tract) infections 08/16/2019    History of urinary tract infection    Presence of intraocular lens 08/08/2019    Pseudophakia of left eye    Unspecified pre-eclampsia, unspecified trimester ([facility])     Toxemia of pregnancy       Past Surgical History:   Procedure Laterality Date    CHOLECYSTECTOMY  04/09/2015    Cholecystectomy    COLECTOMY  04/09/2015    Partial Colectomy    TUBAL LIGATION  04/09/2015    Tubal Ligation       Current Outpatient Medications   Medication Sig Dispense Refill    acetaminophen (Tylenol) 325 mg tablet Take 2 tablets (650 mg) by mouth every 4 hours if needed.      cholecalciferol (Vitamin D-3) 50 mcg (2,000 unit) capsule Take 1 capsule (2,000 Units) by mouth once daily.      fenofibrate (Tricor) 145 mg tablet Take 1 tablet (145 mg) by mouth once daily.      fluconazole (Diflucan) 200 mg tablet Take 1 tablet (200 mg) by mouth once daily. 90 tablet 3    FreeStyle Mandie 3 Plus Sensor device Change sensor every 15 days 6 each 3    gabapentin (Neurontin) 300 mg capsule TAKE 1 CAPSULE(300 MG) BY MOUTH DAILY AT BEDTIME (Patient taking differently: Take 1 capsule (300 mg) by mouth once daily at bedtime.) 90 capsule 1    icosapent ethyL (Vascepa) 1 gram capsule Take 2 capsules (2 g) by mouth 2 times a day. 360 capsule 3    insulin glargine (Basaglar KwikPen U-100 Insulin) 100 unit/mL (3 mL) pen Inject 35 units twice  a day (Patient taking differently: 35 Units 2 times a day. Inject 35 units twice a day) 75 mL 1    insulin lispro (HumaLOG KwikPen Insulin) 100 unit/mL pen Inject 15 units before breakfast, 40 units before lunch, 55 units before dinner.  units. (Patient taking differently: Inject under the skin 3 times daily (morning, midday, late afternoon). Inject 15 units before breakfast, 40 units before lunch, 55 units before dinner.  units.) 120 mL 1    levothyroxine (Synthroid, Levoxyl) 50 mcg tablet Take 1 tablet daily (first thing in the morning on an empty stomach with water. Wait 30 mins before eating/drinking/taking other meds. Wait 4 hours before taking calcium/iron/multivitamins) 270 tablet 3    magnesium chloride (Slow-Mag) 71.5 mg tablet,delayed release (DR/EC) Take 1 tablet (71.5 mg) by mouth 2 times a day.      multivitamin with minerals (multivit-min-iron fum-folic ac) tablet Take 1 tablet by mouth once daily. (Patient not taking: Reported on 5/1/2025)      multivitamin with minerals iron-free (Centrum Silver) Take 1 tablet by mouth once daily.      omeprazole (PriLOSEC) 40 mg DR capsule Take 1 capsule (40 mg) by mouth once daily in the morning. Take before meals. Do not crush or chew. 30 capsule 1    predniSONE (Deltasone) 5 mg tablet Take 0.5 tablets (2.5 mg) by mouth once daily. 45 tablet 3    rosuvastatin (Crestor) 20 mg tablet Take 1 tablet (20 mg) by mouth once daily. 30 tablet 11    sertraline (Zoloft) 100 mg tablet Take 1 tablet (100 mg) by mouth once daily. 30 tablet 11    sodium bicarbonate 650 mg tablet Take 2 tablets (1,300 mg) by mouth 2 times a day. 120 tablet 11    traZODone (Desyrel) 100 mg tablet Take 1 tablet (100 mg) by mouth once daily at bedtime. 30 tablet 0     No current facility-administered medications for this visit.       PHYSICAL EXAM:  There were no vitals taken for this visit.     Lab Results   Component Value Date    WBC 7.0 05/06/2025    HGB 11.3 (L) 05/06/2025    HCT  35.0 (L) 05/06/2025    MCV 96 05/06/2025     05/06/2025     Lab Results   Component Value Date    ALT 21 07/15/2023    AST 33 07/15/2023    ALKPHOS 72 07/15/2023    BILITOT 0.6 07/15/2023       === 03/08/23 ===    CT CHEST WO IV CONTRAST    - Impression -  1.  Clusters of ground-glass nodules within the right lower lobe  measuring up to 0.4 cm. Given patient's history, findings may be  infectious/inflammatory in nature. Otherwise, no acute  cardiopulmonary process.  2. Additional solid pulmonary nodules within the bilateral lungs  measuring up to 0.6 cm.  If patient has risk factors for lung cancer,  a follow-up chest CT in 1 year is considered optional per 2017  Fleischner Society guidelines. Otherwise, in the absence of risk  factors, no routine follow-up is indicated.  3. Numerous calcified mediastinal and hilar lymph nodes likely  reflecting sequela of prior granulomatous infection.  4.  Moderate coronary artery calcifications, indicating the presence  of coronary artery disease. If the patient has associated symptoms  recommend management as per chest pain guidelines (e.g.  https://doi.org/10.1161/CIR.5056150381514005). If the patient is  asymptomatic consider reviewing modifiable cardiovascular risk  factors and managing as per guidelines for primary prevention (e.g.  https://doi.org/10.1161/CIR.7662793595519577)      2017 FLEISCHNER SOCIETY GUIDELINES FOR INCIDENTAL NODULES    - Guidelines apply to patients > 35 years-old without history of  malignancy or immunocompromise.  - Guidelines apply to a nodule incidentally detected on baseline  examination, NOT A NEW NODULE that was not present on a prior exam.  - Dimensions are average of long and short axis, rounded to the  nearest millimeter.  - Consider all relevant risk factors.  - The minimum threshold size for recommending follow-up is based on  an estimated cancer risk in a nodule of >/= 1%.    SINGLE NODULE:  Low Risk:  < 6 mm No routine  follow-up  High risk:  < 6 mm Optional CT at 12 months    MULTIPLE NODULES:  Low risk:  < 6 mm No routine follow-up  High risk:  < 6 mm Optional CT at 12 months      ASSESSMENT  ***    PLAN  ***

## 2025-06-10 ENCOUNTER — APPOINTMENT (OUTPATIENT)
Dept: GASTROENTEROLOGY | Facility: HOSPITAL | Age: 65
End: 2025-06-10
Payer: COMMERCIAL

## 2025-07-02 ENCOUNTER — HOSPITAL ENCOUNTER (OUTPATIENT)
Dept: DIALYSIS | Facility: HOSPITAL | Age: 65
Setting detail: DIALYSIS SERIES
End: 2025-07-02
Payer: COMMERCIAL

## 2025-07-07 NOTE — PROGRESS NOTES
REASON FOR VISIT:  Crohn's disease    HPI:  Agnes M Reyes is a 65 y.o. female who presents for follow-up.  Last seen 10/2023.  Longstanding Crohn's disease, s/p subtotal colectomy with ileorectal anastomosis 2007.      2008 mild anastomostic dz, started Humira.; complete healing with normal F/S in 2012 and 2016 (sigmoid diverticulosis).     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  Path focal acute ileitis.  Stopped Humira.     11/2022 hospitalized with 1-week of n/v, generalized weakness, decreased PO intake and fevers (max 102.8) and two days of epigastric pain. CT A/P (non-contrast given severe anaphylaxis allergy to contrast) showed mild enlargement and edema of the pancreatic head and uncinated process with subtle surrounding stranding likely representing acute edematous interstitial pancreatitis.      Last seen 10/2023.  At the time had stopped prednisone and was falling and losing weight.  Thought to perhaps be adrenally insufficient and started low dose prednisone with some improvement.    In follow-up today,       REVIEW OF SYSTEMS    Allergies   Allergen Reactions    Dye Anaphylaxis     IV Contrast Dye    Iodides Anaphylaxis    Iodinated Contrast Media Anaphylaxis     STOPPED HEART    Toradol [Ketorolac] Shortness of breath and Swelling    Adhesive Tape-Silicones Other     Blisters       Past Medical History:   Diagnosis Date    Combined forms of age-related cataract, bilateral 08/01/2019    Combined form of senile cataract of both eyes    Combined forms of age-related cataract, right eye 08/15/2019    Combined form of senile cataract of right eye    Histoplasmosis, unspecified 08/19/2022    Disseminated  histoplasmosis    Other conditions influencing health status 01/28/2022    Uncontrolled insulin dependent diabetes mellitus    Other muscle spasm 10/14/2015    Muscle spasm    Personal history of other diseases of the digestive system 04/03/2014    History of constipation    Personal history of other diseases of the digestive system 10/20/2015    History of acute pancreatitis    Personal history of other specified conditions 04/12/2016    History of dizziness    Personal history of other specified conditions 10/20/2015    History of abnormal weight loss    Personal history of urinary (tract) infections 08/16/2019    History of urinary tract infection    Presence of intraocular lens 08/08/2019    Pseudophakia of left eye    Unspecified pre-eclampsia, unspecified trimester ([facility])     Toxemia of pregnancy       Past Surgical History:   Procedure Laterality Date    CHOLECYSTECTOMY  04/09/2015    Cholecystectomy    COLECTOMY  04/09/2015    Partial Colectomy    TUBAL LIGATION  04/09/2015    Tubal Ligation       Current Outpatient Medications   Medication Sig Dispense Refill    acetaminophen (Tylenol) 325 mg tablet Take 2 tablets (650 mg) by mouth every 4 hours if needed.      cholecalciferol (Vitamin D-3) 50 mcg (2,000 unit) capsule Take 1 capsule (2,000 Units) by mouth once daily.      fenofibrate (Tricor) 145 mg tablet Take 1 tablet (145 mg) by mouth once daily.      fluconazole (Diflucan) 200 mg tablet Take 1 tablet (200 mg) by mouth once daily. 90 tablet 3    FreeStyle Mandie 3 Plus Sensor device Change sensor every 15 days 6 each 3    gabapentin (Neurontin) 300 mg capsule TAKE 1 CAPSULE(300 MG) BY MOUTH DAILY AT BEDTIME (Patient taking differently: Take 1 capsule (300 mg) by mouth once daily at bedtime.) 90 capsule 1    icosapent ethyL (Vascepa) 1 gram capsule Take 2 capsules (2 g) by mouth 2 times a day. 360 capsule 3    insulin glargine (Basaglar KwikPen U-100 Insulin) 100 unit/mL (3 mL) pen Inject 35 units twice  a day (Patient taking differently: 35 Units 2 times a day. Inject 35 units twice a day) 75 mL 1    insulin lispro (HumaLOG KwikPen Insulin) 100 unit/mL pen Inject 15 units before breakfast, 40 units before lunch, 55 units before dinner.  units. (Patient taking differently: Inject under the skin 3 times daily (morning, midday, late afternoon). Inject 15 units before breakfast, 40 units before lunch, 55 units before dinner.  units.) 120 mL 1    levothyroxine (Synthroid, Levoxyl) 50 mcg tablet Take 1 tablet daily (first thing in the morning on an empty stomach with water. Wait 30 mins before eating/drinking/taking other meds. Wait 4 hours before taking calcium/iron/multivitamins) 270 tablet 3    magnesium chloride (Slow-Mag) 71.5 mg tablet,delayed release (DR/EC) Take 1 tablet (71.5 mg) by mouth 2 times a day.      multivitamin with minerals (multivit-min-iron fum-folic ac) tablet Take 1 tablet by mouth once daily. (Patient not taking: Reported on 5/1/2025)      multivitamin with minerals iron-free (Centrum Silver) Take 1 tablet by mouth once daily.      omeprazole (PriLOSEC) 40 mg DR capsule Take 1 capsule (40 mg) by mouth once daily in the morning. Take before meals. Do not crush or chew. 30 capsule 1    predniSONE (Deltasone) 5 mg tablet Take 0.5 tablets (2.5 mg) by mouth once daily. 45 tablet 3    rosuvastatin (Crestor) 20 mg tablet Take 1 tablet (20 mg) by mouth once daily. 30 tablet 11    sertraline (Zoloft) 100 mg tablet Take 1 tablet (100 mg) by mouth once daily. 30 tablet 11    sodium bicarbonate 650 mg tablet Take 2 tablets (1,300 mg) by mouth 2 times a day. 120 tablet 11    traZODone (Desyrel) 100 mg tablet Take 1 tablet (100 mg) by mouth once daily at bedtime. 30 tablet 0     No current facility-administered medications for this visit.       PHYSICAL EXAM:  There were no vitals taken for this visit.     Lab Results   Component Value Date    WBC 7.0 05/06/2025    HGB 11.3 (L) 05/06/2025    HCT  35.0 (L) 05/06/2025    MCV 96 05/06/2025     05/06/2025     Lab Results   Component Value Date    ALT 21 07/15/2023    AST 33 07/15/2023    ALKPHOS 72 07/15/2023    BILITOT 0.6 07/15/2023       === 03/08/23 ===    CT CHEST WO IV CONTRAST    - Impression -  1.  Clusters of ground-glass nodules within the right lower lobe  measuring up to 0.4 cm. Given patient's history, findings may be  infectious/inflammatory in nature. Otherwise, no acute  cardiopulmonary process.  2. Additional solid pulmonary nodules within the bilateral lungs  measuring up to 0.6 cm.  If patient has risk factors for lung cancer,  a follow-up chest CT in 1 year is considered optional per 2017  Fleischner Society guidelines. Otherwise, in the absence of risk  factors, no routine follow-up is indicated.  3. Numerous calcified mediastinal and hilar lymph nodes likely  reflecting sequela of prior granulomatous infection.  4.  Moderate coronary artery calcifications, indicating the presence  of coronary artery disease. If the patient has associated symptoms  recommend management as per chest pain guidelines (e.g.  https://doi.org/10.1161/CIR.3596918295390206). If the patient is  asymptomatic consider reviewing modifiable cardiovascular risk  factors and managing as per guidelines for primary prevention (e.g.  https://doi.org/10.1161/CIR.8501416378207897)      2017 FLEISCHNER SOCIETY GUIDELINES FOR INCIDENTAL NODULES    - Guidelines apply to patients > 35 years-old without history of  malignancy or immunocompromise.  - Guidelines apply to a nodule incidentally detected on baseline  examination, NOT A NEW NODULE that was not present on a prior exam.  - Dimensions are average of long and short axis, rounded to the  nearest millimeter.  - Consider all relevant risk factors.  - The minimum threshold size for recommending follow-up is based on  an estimated cancer risk in a nodule of >/= 1%.    SINGLE NODULE:  Low Risk:  < 6 mm No routine  follow-up  High risk:  < 6 mm Optional CT at 12 months    MULTIPLE NODULES:  Low risk:  < 6 mm No routine follow-up  High risk:  < 6 mm Optional CT at 12 months      ASSESSMENT  #Crohn's disease-    PLAN  ***

## 2025-07-08 ENCOUNTER — APPOINTMENT (OUTPATIENT)
Dept: DIALYSIS | Facility: HOSPITAL | Age: 65
End: 2025-07-08
Payer: COMMERCIAL

## 2025-07-08 ENCOUNTER — APPOINTMENT (OUTPATIENT)
Dept: GASTROENTEROLOGY | Facility: HOSPITAL | Age: 65
End: 2025-07-08
Payer: COMMERCIAL

## 2025-07-22 ENCOUNTER — APPOINTMENT (OUTPATIENT)
Dept: GASTROENTEROLOGY | Facility: HOSPITAL | Age: 65
End: 2025-07-22
Payer: COMMERCIAL

## 2025-07-23 DIAGNOSIS — E78.2 MIXED HYPERLIPIDEMIA: ICD-10-CM

## 2025-07-23 RX ORDER — FENOFIBRATE 145 MG/1
145 TABLET, FILM COATED ORAL DAILY
Qty: 90 TABLET | Refills: 1 | Status: SHIPPED | OUTPATIENT
Start: 2025-07-23

## 2025-07-30 ENCOUNTER — APPOINTMENT (OUTPATIENT)
Dept: DIALYSIS | Facility: HOSPITAL | Age: 65
End: 2025-07-30
Payer: COMMERCIAL

## 2025-08-06 ENCOUNTER — HOSPITAL ENCOUNTER (OUTPATIENT)
Dept: DIALYSIS | Facility: HOSPITAL | Age: 65
Setting detail: DIALYSIS SERIES
Discharge: HOME | End: 2025-08-06
Payer: COMMERCIAL

## 2025-08-06 VITALS
DIASTOLIC BLOOD PRESSURE: 73 MMHG | BODY MASS INDEX: 21.93 KG/M2 | WEIGHT: 133.82 LBS | TEMPERATURE: 96.4 F | HEART RATE: 80 BPM | SYSTOLIC BLOOD PRESSURE: 106 MMHG

## 2025-08-06 DIAGNOSIS — N18.9 HISTORY OF ANEMIA DUE TO CHRONIC KIDNEY DISEASE: ICD-10-CM

## 2025-08-06 DIAGNOSIS — Z86.2 HISTORY OF ANEMIA DUE TO CHRONIC KIDNEY DISEASE: ICD-10-CM

## 2025-08-06 DIAGNOSIS — E20.9 HYPOPARATHYROIDISM, UNSPECIFIED HYPOPARATHYROIDISM TYPE (MULTI): Primary | ICD-10-CM

## 2025-08-06 LAB
ALBUMIN SERPL BCP-MCNC: 3.5 G/DL (ref 3.4–5)
ANION GAP SERPL CALC-SCNC: 16 MMOL/L (ref 10–20)
BUN SERPL-MCNC: 31 MG/DL (ref 6–23)
CALCIUM SERPL-MCNC: 8.6 MG/DL (ref 8.6–10.6)
CHLORIDE SERPL-SCNC: 106 MMOL/L (ref 98–107)
CO2 SERPL-SCNC: 22 MMOL/L (ref 21–32)
CREAT SERPL-MCNC: 1.99 MG/DL (ref 0.5–1.05)
EGFRCR SERPLBLD CKD-EPI 2021: 27 ML/MIN/1.73M*2
ERYTHROCYTE [DISTWIDTH] IN BLOOD BY AUTOMATED COUNT: 13 % (ref 11.5–14.5)
FERRITIN SERPL-MCNC: 771 NG/ML (ref 8–150)
GLUCOSE SERPL-MCNC: 137 MG/DL (ref 74–99)
HBV SURFACE AB SER-ACNC: <3.1 MIU/ML
HBV SURFACE AG SERPL QL IA: NONREACTIVE
HCT VFR BLD AUTO: 29.8 % (ref 36–46)
HGB BLD-MCNC: 9.7 G/DL (ref 12–16)
IRON SATN MFR SERPL: 36 % (ref 25–45)
IRON SERPL-MCNC: 121 UG/DL (ref 35–150)
MCH RBC QN AUTO: 31 PG (ref 26–34)
MCHC RBC AUTO-ENTMCNC: 32.6 G/DL (ref 32–36)
MCV RBC AUTO: 95 FL (ref 80–100)
NRBC BLD-RTO: 0 /100 WBCS (ref 0–0)
PHOSPHATE SERPL-MCNC: 3.9 MG/DL (ref 2.5–4.9)
PLATELET # BLD AUTO: 234 X10*3/UL (ref 150–450)
POTASSIUM SERPL-SCNC: 4.3 MMOL/L (ref 3.5–5.3)
PTH-INTACT SERPL-MCNC: 19.1 PG/ML (ref 18.5–88)
RBC # BLD AUTO: 3.13 X10*6/UL (ref 4–5.2)
SODIUM SERPL-SCNC: 140 MMOL/L (ref 136–145)
TIBC SERPL-MCNC: 332 UG/DL (ref 240–445)
UIBC SERPL-MCNC: 211 UG/DL (ref 110–370)
WBC # BLD AUTO: 7.4 X10*3/UL (ref 4.4–11.3)

## 2025-08-06 PROCEDURE — 87340 HEPATITIS B SURFACE AG IA: CPT | Performed by: NURSE PRACTITIONER

## 2025-08-06 PROCEDURE — 82728 ASSAY OF FERRITIN: CPT | Performed by: INTERNAL MEDICINE

## 2025-08-06 PROCEDURE — 36415 COLL VENOUS BLD VENIPUNCTURE: CPT

## 2025-08-06 PROCEDURE — 6350000001 HC RX 635 EPOETIN >10,000 UNITS: Mod: TB | Performed by: INTERNAL MEDICINE

## 2025-08-06 PROCEDURE — 83970 ASSAY OF PARATHORMONE: CPT | Performed by: INTERNAL MEDICINE

## 2025-08-06 PROCEDURE — 84520 ASSAY OF UREA NITROGEN: CPT | Performed by: INTERNAL MEDICINE

## 2025-08-06 PROCEDURE — 83540 ASSAY OF IRON: CPT | Performed by: INTERNAL MEDICINE

## 2025-08-06 PROCEDURE — 86706 HEP B SURFACE ANTIBODY: CPT | Performed by: NURSE PRACTITIONER

## 2025-08-06 PROCEDURE — 85027 COMPLETE CBC AUTOMATED: CPT | Performed by: INTERNAL MEDICINE

## 2025-08-06 PROCEDURE — 36415 COLL VENOUS BLD VENIPUNCTURE: CPT | Performed by: INTERNAL MEDICINE

## 2025-08-06 RX ORDER — DIPHENHYDRAMINE HYDROCHLORIDE 50 MG/ML
50 INJECTION, SOLUTION INTRAMUSCULAR; INTRAVENOUS AS NEEDED
OUTPATIENT
Start: 2025-08-26

## 2025-08-06 RX ADMIN — EPOETIN ALFA-EPBX 30000 UNITS: 20000 INJECTION, SOLUTION INTRAVENOUS; SUBCUTANEOUS at 15:25

## 2025-08-06 NOTE — PROGRESS NOTES
[facility]  [facility] [address]  Agnes M Reyes   1960   MRN [id number]  25  2:53 PM    Reason for Visit: Injections (Epoetin 20,000 units)      Vitals:    25 1439   BP: 106/73   BP Location: Left arm   Patient Position: Sitting   BP Cuff Size: Adult   Pulse: 80   Temp: 35.8 °C (96.4 °F)   TempSrc: Tympanic   Weight: 60.7 kg (133 lb 13.1 oz)        How is your energy? Very bad  How is your appetite? Very bad  Any nausea and vomiting after eating? no  Any diarrhea or constipation? Diarrhea all the time, 20 times per day  Any SOB? Yes, with walking; when climbing stairs  Any Edema in lower extremities? no  Have you had any recent infections? no  Have you been on antibiotics recently? no  Any questions regarding your Kidney disease? no  DO you have a Dialysis accesses? no    Hemoglobin   Date/Time Value Ref Range Status   2025 02:49 PM 11.3 (L) 12.0 - 16.0 g/dL Final   2025 02:57 PM 10.6 (L) 12.0 - 16.0 g/dL Final     Ferritin   Date/Time Value Ref Range Status   2025 03:50  (H) 8 - 150 ng/mL Final     % Saturation   Date/Time Value Ref Range Status   2025 03:50 PM 22 (L) 25 - 45 % Final        Medications Given in Clinic: Retacrit 30,000 units;  Scheduled Medications[1]     Nursing notes:  Labs drawn (CBC, RFP, PTH, Hep B Ab/Ag, Iron studies). Epoetin given without complication.   Pt left the clinic accompanied by her family member.         RN: Vasyl Cleary RN     25         [1] epoetin maye or biosimilar, 30,000 Units, intravenous, Once

## 2025-08-12 ENCOUNTER — APPOINTMENT (OUTPATIENT)
Dept: GASTROENTEROLOGY | Facility: HOSPITAL | Age: 65
End: 2025-08-12
Payer: COMMERCIAL

## 2025-08-19 ENCOUNTER — APPOINTMENT (OUTPATIENT)
Dept: GASTROENTEROLOGY | Facility: HOSPITAL | Age: 65
End: 2025-08-19
Payer: COMMERCIAL

## 2025-08-25 ENCOUNTER — APPOINTMENT (OUTPATIENT)
Dept: DIALYSIS | Facility: HOSPITAL | Age: 65
End: 2025-08-25
Payer: COMMERCIAL

## 2025-09-16 ENCOUNTER — APPOINTMENT (OUTPATIENT)
Dept: GASTROENTEROLOGY | Facility: HOSPITAL | Age: 65
End: 2025-09-16
Payer: COMMERCIAL

## 2025-09-25 ENCOUNTER — APPOINTMENT (OUTPATIENT)
Dept: NEPHROLOGY | Facility: CLINIC | Age: 65
End: 2025-09-25
Payer: COMMERCIAL